# Patient Record
Sex: FEMALE | Race: WHITE | Employment: OTHER | URBAN - NONMETROPOLITAN AREA
[De-identification: names, ages, dates, MRNs, and addresses within clinical notes are randomized per-mention and may not be internally consistent; named-entity substitution may affect disease eponyms.]

---

## 2017-01-09 ENCOUNTER — HOSPITAL ENCOUNTER (OUTPATIENT)
Dept: CT IMAGING | Age: 57
Discharge: HOME | End: 2017-01-09
Payer: MEDICARE

## 2017-01-09 DIAGNOSIS — K63.9 LESION OF COLON: ICD-10-CM

## 2017-01-09 DIAGNOSIS — R10.9 ABDOMINAL PAIN, UNSPECIFIED SITE: ICD-10-CM

## 2017-01-09 PROCEDURE — 74178 CT ABD&PLV WO CNTR FLWD CNTR: CPT

## 2017-01-09 PROCEDURE — 6360000004 HC RX CONTRAST MEDICATION: Performed by: NURSE PRACTITIONER

## 2017-01-09 PROCEDURE — G0123 SCREEN CERV/VAG THIN LAYER: HCPCS | Performed by: NURSE PRACTITIONER

## 2017-01-09 RX ADMIN — IOPAMIDOL 75 ML: 755 INJECTION, SOLUTION INTRAVENOUS at 11:47

## 2017-01-10 ENCOUNTER — LAB REQUISITION (OUTPATIENT)
Dept: LAB | Facility: HOSPITAL | Age: 57
End: 2017-01-10

## 2017-01-10 DIAGNOSIS — Z01.419 ENCOUNTER FOR GYNECOLOGICAL EXAMINATION WITHOUT ABNORMAL FINDING: ICD-10-CM

## 2017-01-10 LAB
GEN CATEG CVX/VAG CYTO-IMP: NORMAL
LAB AP CASE REPORT: NORMAL
LAB AP GYN ADDITIONAL INFORMATION: NORMAL
Lab: NORMAL
PATH INTERP SPEC-IMP: NORMAL
STAT OF ADQ CVX/VAG CYTO-IMP: NORMAL

## 2017-01-25 RX ORDER — OXYCODONE AND ACETAMINOPHEN 10; 325 MG/1; MG/1
1 TABLET ORAL EVERY 4 HOURS PRN
Qty: 120 TABLET | Refills: 0 | Status: SHIPPED | OUTPATIENT
Start: 2017-01-30 | End: 2017-02-27 | Stop reason: SDUPTHER

## 2017-02-27 RX ORDER — IBUPROFEN 600 MG/1
600 TABLET ORAL EVERY 8 HOURS PRN
COMMUNITY
End: 2017-12-19 | Stop reason: SDUPTHER

## 2017-02-27 RX ORDER — SIMVASTATIN 40 MG
40 TABLET ORAL NIGHTLY
COMMUNITY
End: 2017-09-11 | Stop reason: SDUPTHER

## 2017-02-27 RX ORDER — FUROSEMIDE 40 MG/1
40 TABLET ORAL DAILY
COMMUNITY
End: 2017-09-11 | Stop reason: SDUPTHER

## 2017-02-27 RX ORDER — NITROGLYCERIN 0.4 MG/1
0.4 TABLET SUBLINGUAL EVERY 5 MIN PRN
COMMUNITY
End: 2018-02-09 | Stop reason: SDUPTHER

## 2017-02-27 RX ORDER — ERGOCALCIFEROL 1.25 MG/1
50000 CAPSULE ORAL
COMMUNITY
End: 2017-09-11 | Stop reason: SDUPTHER

## 2017-02-28 RX ORDER — OXYCODONE AND ACETAMINOPHEN 10; 325 MG/1; MG/1
1 TABLET ORAL EVERY 4 HOURS PRN
Qty: 120 TABLET | Refills: 0 | Status: SHIPPED | OUTPATIENT
Start: 2017-03-01 | End: 2017-03-29 | Stop reason: SDUPTHER

## 2017-03-29 ENCOUNTER — HOSPITAL ENCOUNTER (OUTPATIENT)
Dept: PAIN MANAGEMENT | Age: 57
Discharge: HOME | End: 2017-03-29
Payer: MEDICARE

## 2017-03-29 VITALS
HEIGHT: 66 IN | RESPIRATION RATE: 20 BRPM | BODY MASS INDEX: 33.43 KG/M2 | WEIGHT: 208 LBS | SYSTOLIC BLOOD PRESSURE: 124 MMHG | HEART RATE: 64 BPM | TEMPERATURE: 96.6 F | OXYGEN SATURATION: 94 % | DIASTOLIC BLOOD PRESSURE: 44 MMHG

## 2017-03-29 DIAGNOSIS — M51.36 LUMBAR DEGENERATIVE DISC DISEASE: ICD-10-CM

## 2017-03-29 DIAGNOSIS — M51.16 LUMBAR DISC DISEASE WITH RADICULOPATHY: Chronic | ICD-10-CM

## 2017-03-29 DIAGNOSIS — I73.9 CLAUDICATION (HCC): Primary | ICD-10-CM

## 2017-03-29 DIAGNOSIS — G89.29 CHRONIC BILATERAL LOW BACK PAIN WITH SCIATICA, SCIATICA LATERALITY UNSPECIFIED: Chronic | ICD-10-CM

## 2017-03-29 DIAGNOSIS — M51.9 LUMBAR DISC DISEASE: ICD-10-CM

## 2017-03-29 DIAGNOSIS — M54.40 CHRONIC BILATERAL LOW BACK PAIN WITH SCIATICA, SCIATICA LATERALITY UNSPECIFIED: Chronic | ICD-10-CM

## 2017-03-29 PROCEDURE — 6360000002 HC RX W HCPCS

## 2017-03-29 PROCEDURE — 2500000003 HC RX 250 WO HCPCS

## 2017-03-29 PROCEDURE — 2580000003 HC RX 258

## 2017-03-29 PROCEDURE — 3209999900 FLUORO FOR SURGICAL PROCEDURES

## 2017-03-29 PROCEDURE — 62323 NJX INTERLAMINAR LMBR/SAC: CPT

## 2017-03-29 RX ORDER — BUPIVACAINE HYDROCHLORIDE 2.5 MG/ML
INJECTION, SOLUTION EPIDURAL; INFILTRATION; INTRACAUDAL
Status: COMPLETED | OUTPATIENT
Start: 2017-03-29 | End: 2017-03-29

## 2017-03-29 RX ORDER — METHYLPREDNISOLONE ACETATE 80 MG/ML
INJECTION, SUSPENSION INTRA-ARTICULAR; INTRALESIONAL; INTRAMUSCULAR; SOFT TISSUE
Status: COMPLETED | OUTPATIENT
Start: 2017-03-29 | End: 2017-03-29

## 2017-03-29 RX ORDER — INSULIN GLARGINE 100 [IU]/ML
INJECTION, SOLUTION SUBCUTANEOUS NIGHTLY
COMMUNITY
End: 2017-12-12

## 2017-03-29 RX ORDER — 0.9 % SODIUM CHLORIDE 0.9 %
VIAL (ML) INJECTION
Status: COMPLETED | OUTPATIENT
Start: 2017-03-29 | End: 2017-03-29

## 2017-03-29 RX ORDER — LIDOCAINE HYDROCHLORIDE 10 MG/ML
INJECTION, SOLUTION EPIDURAL; INFILTRATION; INTRACAUDAL; PERINEURAL
Status: COMPLETED | OUTPATIENT
Start: 2017-03-29 | End: 2017-03-29

## 2017-03-29 RX ORDER — LORAZEPAM 0.5 MG/1
0.5 TABLET ORAL EVERY 6 HOURS PRN
COMMUNITY
End: 2017-10-24 | Stop reason: ALTCHOICE

## 2017-03-29 RX ADMIN — BUPIVACAINE HYDROCHLORIDE 5 ML: 2.5 INJECTION, SOLUTION EPIDURAL; INFILTRATION; INTRACAUDAL at 13:59

## 2017-03-29 RX ADMIN — LIDOCAINE HYDROCHLORIDE 3 ML: 10 INJECTION, SOLUTION EPIDURAL; INFILTRATION; INTRACAUDAL; PERINEURAL at 13:57

## 2017-03-29 RX ADMIN — Medication 4 ML: at 13:59

## 2017-03-29 RX ADMIN — METHYLPREDNISOLONE ACETATE 80 MG: 80 INJECTION, SUSPENSION INTRA-ARTICULAR; INTRALESIONAL; INTRAMUSCULAR; SOFT TISSUE at 13:59

## 2017-03-29 ASSESSMENT — PAIN DESCRIPTION - DESCRIPTORS: DESCRIPTORS: ACHING;CONSTANT;THROBBING

## 2017-03-29 ASSESSMENT — PAIN - FUNCTIONAL ASSESSMENT: PAIN_FUNCTIONAL_ASSESSMENT: 0-10

## 2017-05-02 RX ORDER — OXYCODONE AND ACETAMINOPHEN 10; 325 MG/1; MG/1
1 TABLET ORAL EVERY 6 HOURS PRN
Qty: 120 TABLET | Refills: 0
Start: 2017-05-03 | End: 2017-05-23 | Stop reason: SDUPTHER

## 2017-05-23 ENCOUNTER — HOSPITAL ENCOUNTER (OUTPATIENT)
Dept: PAIN MANAGEMENT | Age: 57
Discharge: HOME | End: 2017-05-23
Payer: MEDICARE

## 2017-05-23 VITALS
RESPIRATION RATE: 16 BRPM | DIASTOLIC BLOOD PRESSURE: 67 MMHG | WEIGHT: 208 LBS | TEMPERATURE: 97.1 F | BODY MASS INDEX: 33.43 KG/M2 | HEIGHT: 66 IN | OXYGEN SATURATION: 93 % | HEART RATE: 70 BPM | SYSTOLIC BLOOD PRESSURE: 106 MMHG

## 2017-05-23 DIAGNOSIS — M51.9 LUMBAR DISC DISEASE: ICD-10-CM

## 2017-05-23 DIAGNOSIS — M51.16 LUMBAR DISC DISEASE WITH RADICULOPATHY: ICD-10-CM

## 2017-05-23 PROCEDURE — 99213 OFFICE O/P EST LOW 20 MIN: CPT

## 2017-05-23 RX ORDER — OXYCODONE AND ACETAMINOPHEN 10; 325 MG/1; MG/1
1 TABLET ORAL EVERY 6 HOURS PRN
Qty: 120 TABLET | Refills: 0
Start: 2017-05-30 | End: 2017-06-26 | Stop reason: SDUPTHER

## 2017-05-23 RX ORDER — GABAPENTIN 600 MG/1
1 TABLET ORAL 3 TIMES DAILY PRN
COMMUNITY
Start: 2017-04-29 | End: 2017-09-11 | Stop reason: SDUPTHER

## 2017-05-23 ASSESSMENT — PAIN DESCRIPTION - DIRECTION: RADIATING_TOWARDS: DOWN BILATERAL LEGS

## 2017-05-23 ASSESSMENT — PAIN DESCRIPTION - ORIENTATION: ORIENTATION: LOWER

## 2017-05-23 ASSESSMENT — PAIN DESCRIPTION - PAIN TYPE: TYPE: CHRONIC PAIN

## 2017-05-23 ASSESSMENT — PAIN SCALES - GENERAL: PAINLEVEL_OUTOF10: 8

## 2017-05-23 ASSESSMENT — PAIN DESCRIPTION - ONSET: ONSET: ON-GOING

## 2017-05-23 ASSESSMENT — ACTIVITIES OF DAILY LIVING (ADL): EFFECT OF PAIN ON DAILY ACTIVITIES: DAILY ACTIVITES

## 2017-05-23 ASSESSMENT — PAIN DESCRIPTION - DESCRIPTORS: DESCRIPTORS: ACHING;CONSTANT;THROBBING

## 2017-05-23 ASSESSMENT — PAIN DESCRIPTION - LOCATION: LOCATION: BACK

## 2017-05-23 ASSESSMENT — PAIN DESCRIPTION - PROGRESSION: CLINICAL_PROGRESSION: NOT CHANGED

## 2017-05-23 ASSESSMENT — PAIN DESCRIPTION - FREQUENCY: FREQUENCY: CONTINUOUS

## 2017-06-27 RX ORDER — OXYCODONE AND ACETAMINOPHEN 10; 325 MG/1; MG/1
1 TABLET ORAL EVERY 6 HOURS PRN
Qty: 120 TABLET | Refills: 0 | Status: SHIPPED | OUTPATIENT
Start: 2017-06-29 | End: 2017-07-24 | Stop reason: SDUPTHER

## 2017-07-25 RX ORDER — OXYCODONE AND ACETAMINOPHEN 10; 325 MG/1; MG/1
1 TABLET ORAL EVERY 6 HOURS PRN
Qty: 120 TABLET | Refills: 0 | Status: SHIPPED | OUTPATIENT
Start: 2017-07-29 | End: 2017-08-24 | Stop reason: SDUPTHER

## 2017-08-25 RX ORDER — OXYCODONE AND ACETAMINOPHEN 10; 325 MG/1; MG/1
1 TABLET ORAL EVERY 6 HOURS PRN
Qty: 40 TABLET | Refills: 0 | Status: SHIPPED | OUTPATIENT
Start: 2017-08-28 | End: 2017-09-07 | Stop reason: SDUPTHER

## 2017-09-07 ENCOUNTER — HOSPITAL ENCOUNTER (OUTPATIENT)
Dept: PAIN MANAGEMENT | Age: 57
Discharge: HOME | End: 2017-09-07
Payer: MEDICARE

## 2017-09-07 VITALS
SYSTOLIC BLOOD PRESSURE: 133 MMHG | HEIGHT: 66 IN | HEART RATE: 90 BPM | RESPIRATION RATE: 18 BRPM | TEMPERATURE: 98.1 F | OXYGEN SATURATION: 95 % | DIASTOLIC BLOOD PRESSURE: 65 MMHG | BODY MASS INDEX: 31.34 KG/M2 | WEIGHT: 195 LBS

## 2017-09-07 DIAGNOSIS — M51.16 LUMBAR DISC DISEASE WITH RADICULOPATHY: Primary | ICD-10-CM

## 2017-09-07 DIAGNOSIS — M51.9 LUMBAR DISC DISEASE: ICD-10-CM

## 2017-09-07 DIAGNOSIS — M54.40 CHRONIC BILATERAL LOW BACK PAIN WITH SCIATICA, SCIATICA LATERALITY UNSPECIFIED: Chronic | ICD-10-CM

## 2017-09-07 DIAGNOSIS — G89.29 CHRONIC BILATERAL LOW BACK PAIN WITH SCIATICA, SCIATICA LATERALITY UNSPECIFIED: Chronic | ICD-10-CM

## 2017-09-07 PROCEDURE — 99214 OFFICE O/P EST MOD 30 MIN: CPT

## 2017-09-07 RX ORDER — OXYCODONE AND ACETAMINOPHEN 10; 325 MG/1; MG/1
1 TABLET ORAL 2 TIMES DAILY PRN
Qty: 60 TABLET | Refills: 0 | Status: SHIPPED | OUTPATIENT
Start: 2017-09-07 | End: 2017-09-20 | Stop reason: ALTCHOICE

## 2017-09-07 RX ORDER — OXYCODONE AND ACETAMINOPHEN 10; 325 MG/1; MG/1
1 TABLET ORAL EVERY 6 HOURS PRN
Qty: 120 TABLET | Refills: 0 | Status: SHIPPED | OUTPATIENT
Start: 2017-09-07 | End: 2017-09-07 | Stop reason: SDUPTHER

## 2017-09-07 ASSESSMENT — PAIN - FUNCTIONAL ASSESSMENT: PAIN_FUNCTIONAL_ASSESSMENT: 0-10

## 2017-09-07 ASSESSMENT — PAIN DESCRIPTION - DESCRIPTORS: DESCRIPTORS: ACHING;CONSTANT;THROBBING;RADIATING;NUMBNESS;TINGLING

## 2017-09-07 ASSESSMENT — ACTIVITIES OF DAILY LIVING (ADL): EFFECT OF PAIN ON DAILY ACTIVITIES: DAILY CHORES AND ACTIVITIES

## 2017-09-11 ENCOUNTER — OFFICE VISIT (OUTPATIENT)
Dept: INTERNAL MEDICINE | Age: 57
End: 2017-09-11
Payer: MEDICARE

## 2017-09-11 VITALS
DIASTOLIC BLOOD PRESSURE: 82 MMHG | OXYGEN SATURATION: 94 % | HEART RATE: 76 BPM | WEIGHT: 195 LBS | BODY MASS INDEX: 31.34 KG/M2 | TEMPERATURE: 97.9 F | SYSTOLIC BLOOD PRESSURE: 134 MMHG | RESPIRATION RATE: 18 BRPM | HEIGHT: 66 IN

## 2017-09-11 DIAGNOSIS — M81.0 OSTEOPOROSIS, UNSPECIFIED OSTEOPOROSIS TYPE, UNSPECIFIED PATHOLOGICAL FRACTURE PRESENCE: Primary | ICD-10-CM

## 2017-09-11 DIAGNOSIS — E88.9 PERIPHERAL NEUROPATHY DUE TO METABOLIC DISORDER (HCC): ICD-10-CM

## 2017-09-11 DIAGNOSIS — G63 PERIPHERAL NEUROPATHY DUE TO METABOLIC DISORDER (HCC): ICD-10-CM

## 2017-09-11 DIAGNOSIS — E78.2 MIXED HYPERLIPIDEMIA: ICD-10-CM

## 2017-09-11 DIAGNOSIS — E11.40 TYPE 2 DIABETES MELLITUS WITH DIABETIC NEUROPATHY, WITHOUT LONG-TERM CURRENT USE OF INSULIN (HCC): ICD-10-CM

## 2017-09-11 DIAGNOSIS — D64.9 ANEMIA, UNSPECIFIED TYPE: ICD-10-CM

## 2017-09-11 DIAGNOSIS — G89.4 CHRONIC PAIN SYNDROME: ICD-10-CM

## 2017-09-11 DIAGNOSIS — Z12.31 ENCOUNTER FOR SCREENING MAMMOGRAM FOR BREAST CANCER: ICD-10-CM

## 2017-09-11 DIAGNOSIS — E55.9 VITAMIN D DEFICIENCY: ICD-10-CM

## 2017-09-11 DIAGNOSIS — Z00.00 HEALTH CARE MAINTENANCE: ICD-10-CM

## 2017-09-11 PROCEDURE — G8427 DOCREV CUR MEDS BY ELIG CLIN: HCPCS | Performed by: NURSE PRACTITIONER

## 2017-09-11 PROCEDURE — 4004F PT TOBACCO SCREEN RCVD TLK: CPT | Performed by: NURSE PRACTITIONER

## 2017-09-11 PROCEDURE — 99214 OFFICE O/P EST MOD 30 MIN: CPT | Performed by: NURSE PRACTITIONER

## 2017-09-11 PROCEDURE — 4005F PHARM THX FOR OP RXD: CPT | Performed by: NURSE PRACTITIONER

## 2017-09-11 PROCEDURE — G8417 CALC BMI ABV UP PARAM F/U: HCPCS | Performed by: NURSE PRACTITIONER

## 2017-09-11 PROCEDURE — 3017F COLORECTAL CA SCREEN DOC REV: CPT | Performed by: NURSE PRACTITIONER

## 2017-09-11 PROCEDURE — 3014F SCREEN MAMMO DOC REV: CPT | Performed by: NURSE PRACTITIONER

## 2017-09-11 PROCEDURE — 3046F HEMOGLOBIN A1C LEVEL >9.0%: CPT | Performed by: NURSE PRACTITIONER

## 2017-09-11 RX ORDER — GABAPENTIN 300 MG/1
300 CAPSULE ORAL 3 TIMES DAILY
Qty: 90 CAPSULE | Refills: 3 | Status: SHIPPED | OUTPATIENT
Start: 2017-09-11 | End: 2017-10-18 | Stop reason: SDUPTHER

## 2017-09-11 RX ORDER — CHLORAL HYDRATE 500 MG
3000 CAPSULE ORAL 3 TIMES DAILY
Qty: 90 CAPSULE | Refills: 3 | Status: SHIPPED | OUTPATIENT
Start: 2017-09-11 | End: 2017-09-11 | Stop reason: SDUPTHER

## 2017-09-11 RX ORDER — VENLAFAXINE HYDROCHLORIDE 150 MG/1
150 CAPSULE, EXTENDED RELEASE ORAL DAILY
Qty: 30 CAPSULE | Refills: 0 | Status: SHIPPED | OUTPATIENT
Start: 2017-09-11 | End: 2017-10-16 | Stop reason: SDUPTHER

## 2017-09-11 RX ORDER — GLIPIZIDE 5 MG/1
5 TABLET ORAL
Qty: 60 TABLET | Refills: 2 | Status: SHIPPED | OUTPATIENT
Start: 2017-09-11 | End: 2017-12-19 | Stop reason: SDUPTHER

## 2017-09-11 RX ORDER — CHLORAL HYDRATE 500 MG
1000 CAPSULE ORAL 2 TIMES DAILY
Qty: 90 CAPSULE | Refills: 3 | Status: SHIPPED | OUTPATIENT
Start: 2017-09-11 | End: 2018-06-25 | Stop reason: SDUPTHER

## 2017-09-11 RX ORDER — SIMVASTATIN 40 MG
40 TABLET ORAL NIGHTLY
Qty: 30 TABLET | Refills: 2 | Status: SHIPPED | OUTPATIENT
Start: 2017-09-11 | End: 2018-02-09 | Stop reason: SDUPTHER

## 2017-09-11 RX ORDER — ERGOCALCIFEROL 1.25 MG/1
50000 CAPSULE ORAL
Qty: 8 CAPSULE | Refills: 2 | Status: SHIPPED | OUTPATIENT
Start: 2017-09-11 | End: 2018-06-25 | Stop reason: SDUPTHER

## 2017-09-11 RX ORDER — METOPROLOL SUCCINATE 50 MG/1
25 TABLET, EXTENDED RELEASE ORAL DAILY
Qty: 30 TABLET | Refills: 11 | Status: SHIPPED | OUTPATIENT
Start: 2017-09-11 | End: 2017-12-19 | Stop reason: SDUPTHER

## 2017-09-11 RX ORDER — LISINOPRIL 40 MG/1
20 TABLET ORAL DAILY
Qty: 30 TABLET | Refills: 1 | Status: SHIPPED | OUTPATIENT
Start: 2017-09-11 | End: 2018-02-09 | Stop reason: SDUPTHER

## 2017-09-11 RX ORDER — OMEPRAZOLE 40 MG/1
40 CAPSULE, DELAYED RELEASE ORAL DAILY
Qty: 30 CAPSULE | Refills: 2 | Status: SHIPPED | OUTPATIENT
Start: 2017-09-11 | End: 2017-12-19 | Stop reason: SDUPTHER

## 2017-09-11 RX ORDER — FUROSEMIDE 40 MG/1
40 TABLET ORAL DAILY
Qty: 30 TABLET | Refills: 3 | Status: SHIPPED | OUTPATIENT
Start: 2017-09-11 | End: 2019-07-02

## 2017-09-11 ASSESSMENT — PATIENT HEALTH QUESTIONNAIRE - PHQ9
SUM OF ALL RESPONSES TO PHQ9 QUESTIONS 1 & 2: 0
2. FEELING DOWN, DEPRESSED OR HOPELESS: 0
1. LITTLE INTEREST OR PLEASURE IN DOING THINGS: 0
SUM OF ALL RESPONSES TO PHQ QUESTIONS 1-9: 0

## 2017-09-18 ENCOUNTER — TELEPHONE (OUTPATIENT)
Dept: PAIN MANAGEMENT | Age: 57
End: 2017-09-18

## 2017-09-20 RX ORDER — HYDROCODONE BITARTRATE AND ACETAMINOPHEN 7.5; 325 MG/1; MG/1
1 TABLET ORAL EVERY 6 HOURS PRN
Qty: 120 TABLET | Refills: 0 | Status: SHIPPED | OUTPATIENT
Start: 2017-09-20 | End: 2017-09-27

## 2017-09-22 ENCOUNTER — HOSPITAL ENCOUNTER (OUTPATIENT)
Dept: WOMENS IMAGING | Age: 57
Discharge: HOME | End: 2017-09-22
Payer: MEDICARE

## 2017-09-22 DIAGNOSIS — Z12.31 ENCOUNTER FOR SCREENING MAMMOGRAM FOR BREAST CANCER: ICD-10-CM

## 2017-09-22 DIAGNOSIS — M81.0 OSTEOPOROSIS, UNSPECIFIED OSTEOPOROSIS TYPE, UNSPECIFIED PATHOLOGICAL FRACTURE PRESENCE: ICD-10-CM

## 2017-09-22 DIAGNOSIS — Z00.00 HEALTH CARE MAINTENANCE: ICD-10-CM

## 2017-09-22 PROCEDURE — 77063 BREAST TOMOSYNTHESIS BI: CPT

## 2017-09-22 PROCEDURE — 77080 DXA BONE DENSITY AXIAL: CPT

## 2017-10-02 ENCOUNTER — TELEPHONE (OUTPATIENT)
Dept: WOMENS IMAGING | Age: 57
End: 2017-10-02

## 2017-10-06 NOTE — TELEPHONE ENCOUNTER
Contacted patient regarding abnormal screening mammogram results and scheduled her for diagnostic imaging

## 2017-10-16 DIAGNOSIS — Z00.00 HEALTH CARE MAINTENANCE: ICD-10-CM

## 2017-10-16 RX ORDER — VENLAFAXINE HYDROCHLORIDE 150 MG/1
150 CAPSULE, EXTENDED RELEASE ORAL DAILY
Qty: 30 CAPSULE | Refills: 0 | Status: SHIPPED | OUTPATIENT
Start: 2017-10-16 | End: 2017-11-20 | Stop reason: SDUPTHER

## 2017-10-18 DIAGNOSIS — Z00.00 HEALTH CARE MAINTENANCE: ICD-10-CM

## 2017-10-18 RX ORDER — GABAPENTIN 600 MG/1
TABLET ORAL
COMMUNITY
Start: 2017-09-12 | End: 2017-10-18 | Stop reason: DRUGHIGH

## 2017-10-18 RX ORDER — GABAPENTIN 300 MG/1
300 CAPSULE ORAL 3 TIMES DAILY
Qty: 90 CAPSULE | Refills: 0 | Status: SHIPPED | OUTPATIENT
Start: 2017-10-18 | End: 2017-11-15 | Stop reason: SDUPTHER

## 2017-10-18 RX ORDER — GABAPENTIN 300 MG/1
300 CAPSULE ORAL 3 TIMES DAILY
Qty: 90 CAPSULE | Refills: 3 | Status: SHIPPED | OUTPATIENT
Start: 2017-10-18 | End: 2017-10-18 | Stop reason: SDUPTHER

## 2017-10-18 RX ORDER — HYDROCODONE BITARTRATE AND ACETAMINOPHEN 7.5; 325 MG/1; MG/1
1 TABLET ORAL EVERY 6 HOURS PRN
Qty: 120 TABLET | Refills: 0 | Status: SHIPPED | OUTPATIENT
Start: 2017-10-18 | End: 2017-10-25

## 2017-10-18 NOTE — TELEPHONE ENCOUNTER
Oswald Farnsworth called requesting a refill of the below medication which has been pended for you:     Requested Prescriptions     Pending Prescriptions Disp Refills    gabapentin (NEURONTIN) 300 MG capsule 90 capsule 0     Sig: Take 1 capsule by mouth 3 times daily       Last Appointment Date: 9/11/2017  Next Appointment Date: Visit date not found    Allergies   Allergen Reactions    Levaquin [Levofloxacin In D5w] Anaphylaxis    Noroxin [Norfloxacin] Shortness Of Breath    Codeine     Prozac [Fluoxetine Hcl]

## 2017-10-18 NOTE — TELEPHONE ENCOUNTER
From: Mustapha Bautista  Sent: 10/18/2017 1:32 PM CDT  Subject: Medication Renewal Request    Mustapha Bautista would like a refill of the following medications:  gabapentin (NEURONTIN) 300 MG capsule MAGDALENA Beltran]    Preferred pharmacy: [facility] [address] [place] [address]. [address] [phone] - F [phone]    Comment:  Need refill on my Hydrocodone 7.5,, Also ask Dr. Shaun Naylor that it was supposed to be the 10mg. .., as we had discussed? ??

## 2017-11-15 DIAGNOSIS — Z00.00 HEALTH CARE MAINTENANCE: ICD-10-CM

## 2017-11-15 RX ORDER — GABAPENTIN 300 MG/1
300 CAPSULE ORAL 3 TIMES DAILY
Qty: 90 CAPSULE | Refills: 0 | Status: SHIPPED | OUTPATIENT
Start: 2017-11-15 | End: 2017-12-13 | Stop reason: SDUPTHER

## 2017-11-15 RX ORDER — HYDROCODONE BITARTRATE AND ACETAMINOPHEN 7.5; 325 MG/1; MG/1
1 TABLET ORAL EVERY 6 HOURS PRN
Qty: 120 TABLET | Refills: 0 | Status: SHIPPED | OUTPATIENT
Start: 2017-11-15 | End: 2018-01-10 | Stop reason: ALTCHOICE

## 2017-11-15 RX ORDER — HYDROCODONE BITARTRATE AND ACETAMINOPHEN 7.5; 325 MG/1; MG/1
7.5 TABLET ORAL EVERY 6 HOURS PRN
COMMUNITY
Start: 2017-10-20 | End: 2017-11-15 | Stop reason: SDUPTHER

## 2017-11-20 DIAGNOSIS — Z00.00 HEALTH CARE MAINTENANCE: ICD-10-CM

## 2017-11-21 RX ORDER — VENLAFAXINE HYDROCHLORIDE 150 MG/1
150 CAPSULE, EXTENDED RELEASE ORAL DAILY
Qty: 30 CAPSULE | Refills: 0 | Status: SHIPPED | OUTPATIENT
Start: 2017-11-21 | End: 2017-12-19 | Stop reason: SDUPTHER

## 2017-12-12 ENCOUNTER — OFFICE VISIT (OUTPATIENT)
Dept: INTERNAL MEDICINE | Age: 57
End: 2017-12-12
Payer: MEDICARE

## 2017-12-12 VITALS
RESPIRATION RATE: 16 BRPM | HEIGHT: 66 IN | SYSTOLIC BLOOD PRESSURE: 130 MMHG | WEIGHT: 182 LBS | OXYGEN SATURATION: 95 % | TEMPERATURE: 97.1 F | BODY MASS INDEX: 29.25 KG/M2 | DIASTOLIC BLOOD PRESSURE: 82 MMHG | HEART RATE: 70 BPM

## 2017-12-12 DIAGNOSIS — R30.0 DYSURIA: Primary | ICD-10-CM

## 2017-12-12 DIAGNOSIS — G89.4 CHRONIC PAIN SYNDROME: ICD-10-CM

## 2017-12-12 DIAGNOSIS — M51.36 LUMBAR DEGENERATIVE DISC DISEASE: ICD-10-CM

## 2017-12-12 LAB
APPEARANCE FLUID: ABNORMAL
BILIRUBIN, POC: ABNORMAL
BLOOD URINE, POC: ABNORMAL
CLARITY, POC: ABNORMAL
COLOR, POC: ABNORMAL
GLUCOSE URINE, POC: NEGATIVE
KETONES, POC: NEGATIVE
LEUKOCYTE EST, POC: ABNORMAL
NITRITE, POC: POSITIVE
PH, POC: 6
PROTEIN, POC: 100
SPECIFIC GRAVITY, POC: >1.03
UROBILINOGEN, POC: 0.1

## 2017-12-12 PROCEDURE — 3014F SCREEN MAMMO DOC REV: CPT | Performed by: NURSE PRACTITIONER

## 2017-12-12 PROCEDURE — G8484 FLU IMMUNIZE NO ADMIN: HCPCS | Performed by: NURSE PRACTITIONER

## 2017-12-12 PROCEDURE — 3017F COLORECTAL CA SCREEN DOC REV: CPT | Performed by: NURSE PRACTITIONER

## 2017-12-12 PROCEDURE — G8417 CALC BMI ABV UP PARAM F/U: HCPCS | Performed by: NURSE PRACTITIONER

## 2017-12-12 PROCEDURE — 4004F PT TOBACCO SCREEN RCVD TLK: CPT | Performed by: NURSE PRACTITIONER

## 2017-12-12 PROCEDURE — 99213 OFFICE O/P EST LOW 20 MIN: CPT | Performed by: NURSE PRACTITIONER

## 2017-12-12 PROCEDURE — G8427 DOCREV CUR MEDS BY ELIG CLIN: HCPCS | Performed by: NURSE PRACTITIONER

## 2017-12-12 RX ORDER — CEFDINIR 300 MG/1
300 CAPSULE ORAL 2 TIMES DAILY
Qty: 14 CAPSULE | Refills: 0 | Status: SHIPPED | OUTPATIENT
Start: 2017-12-12 | End: 2017-12-19

## 2017-12-12 RX ORDER — HYDROCODONE BITARTRATE AND ACETAMINOPHEN 10; 325 MG/1; MG/1
1 TABLET ORAL EVERY 6 HOURS PRN
Qty: 120 TABLET | Refills: 0 | Status: SHIPPED | OUTPATIENT
Start: 2017-12-12 | End: 2018-01-10 | Stop reason: SDUPTHER

## 2017-12-12 RX ORDER — PHENAZOPYRIDINE HYDROCHLORIDE 200 MG/1
200 TABLET, FILM COATED ORAL 3 TIMES DAILY PRN
Qty: 9 TABLET | Refills: 0 | Status: SHIPPED | OUTPATIENT
Start: 2017-12-12 | End: 2017-12-15

## 2017-12-12 ASSESSMENT — ENCOUNTER SYMPTOMS
ABDOMINAL DISTENTION: 0
COLOR CHANGE: 0
COUGH: 0
SHORTNESS OF BREATH: 0
TROUBLE SWALLOWING: 0
SORE THROAT: 0
EYE DISCHARGE: 0
EYE ITCHING: 0
DIARRHEA: 0
ABDOMINAL PAIN: 0
CONSTIPATION: 0
NAUSEA: 0
WHEEZING: 0
BACK PAIN: 1
VOMITING: 0
STRIDOR: 0
CHOKING: 0
BLOOD IN STOOL: 0

## 2017-12-12 NOTE — PROGRESS NOTES
[facility]  [facility]  [address] [place] [address]  Dept: [phone]  Dept Fax: [phone]  Loc: [phone]    Adina Moreira is a 56 y.o. female who presents today for her medical conditions/complaints as noted below. Adina Moreira is c/o of Dysuria (Patient states dysuria x 1 week and urine has been cloudy.)        HPI:     HPI   1. Dysuria; For over 10 days ; no fever; Has only been drinking cranberry juice;  A neighbor gave her a pyridium last night so she could sleep  2.. Chronic back pain; The 7.5 lortab which we decreased from 10 is not holding her pain down; She had stopped going to [facility] and we are going to prescribe for her;  Transportation is an issue for her   Chief Complaint   Patient presents with    Dysuria     Patient states dysuria x 1 week and urine has been cloudy.        Past Medical History:   Diagnosis Date    Anxiety     Arthritis     Bilateral low back pain with sciatica 12/21/2015    Bladder incontinence     CAD (coronary artery disease)     CAD (coronary artery disease)     Cervical cancer (HCC)     H/O    COPD (chronic obstructive pulmonary disease) (HCC)     Depression     Diabetes mellitus (HCC)     GERD (gastroesophageal reflux disease)     History of blood transfusion     Hyperlipidemia     Hypertension     Hypoxia       Past Surgical History:   Procedure Laterality Date   [facility] CARDIAC SURGERY      october 8th 2009    CHOLECYSTECTOMY, LAPAROSCOPIC      CORONARY ARTERY BYPASS GRAFT      8/8 triple       Family History   Problem Relation Age of Onset    Cancer Mother     Kidney Disease Father      renal failure       Social History   Substance Use Topics    Smoking status: Current Every Day Smoker     Packs/day: 1.00     Types: Cigarettes    Smokeless tobacco: Never Used    Alcohol use No      Current Outpatient Prescriptions   Medication Sig Dispense Refill    cefdinir (OMNICEF) 300 MG capsule Take 1 capsule by mouth 2 times daily for 7 days 14 capsule 0    phenazopyridine (PYRIDIUM) 200 MG tablet Take 1 tablet by mouth 3 times daily as needed for Pain 9 tablet 0    HYDROcodone-acetaminophen (NORCO)  MG per tablet Take 1 tablet by mouth every 6 hours as needed for Pain . 120 tablet 0    venlafaxine (EFFEXOR XR) 150 MG extended release capsule Take 1 capsule by mouth daily 30 capsule 0    gabapentin (NEURONTIN) 300 MG capsule Take 1 capsule by mouth 3 times daily 90 capsule 0    HYDROcodone-acetaminophen (NORCO) 7.5-325 MG per tablet Take 1 tablet by mouth every 6 hours as needed for Pain . 120 tablet 0    vitamin D (ERGOCALCIFEROL) 89018 units CAPS capsule Take 1 capsule by mouth Twice a Week 8 capsule 2    VENTOLIN  (90 Base) MCG/ACT inhaler Inhale 1 puff into the lungs 4 times daily 1 Inhaler 1    SITagliptin (JANUVIA) 100 MG tablet Take 1 tablet by mouth daily 30 tablet 2    simvastatin (ZOCOR) 40 MG tablet Take 1 tablet by mouth nightly 30 tablet 2    omeprazole (PRILOSEC) 40 MG delayed release capsule Take 1 capsule by mouth daily 30 capsule 2    metoprolol succinate (TOPROL XL) 50 MG extended release tablet Take 0.5 tablets by mouth daily 30 tablet 11    lisinopril (PRINIVIL;ZESTRIL) 40 MG tablet Take 0.5 tablets by mouth daily 30 tablet 1    glipiZIDE (GLUCOTROL) 5 MG tablet Take 1 tablet by mouth 2 times daily (before meals) 60 tablet 2    furosemide (LASIX) 40 MG tablet Take 1 tablet by mouth daily 30 tablet 3    Omega-3 Fatty Acids (FISH OIL) 1000 MG CAPS Take 1 capsule by mouth 2 times daily 90 capsule 3    OXYGEN Inhale into the lungs every 8 hours      nitroGLYCERIN (NITROSTAT) 0.4 MG SL tablet Place 0.4 mg under the tongue every 5 minutes as needed for Chest pain up to max of 3 total doses. If no relief after 1 dose, call 911.       ibuprofen (ADVIL;MOTRIN) 600 MG tablet Take 600 mg by mouth every 8 hours as needed for Pain      Omega-3 Fatty Acids (FISH OIL EXTRA STRENGTH PO) Take 1,200 mg by mouth 2 times daily        No current facility-administered medications for this visit. Allergies   Allergen Reactions    Levaquin [Levofloxacin In D5w] Anaphylaxis    Noroxin [Norfloxacin] Shortness Of Breath    Codeine     Prozac [Fluoxetine Hcl]        Health Maintenance   Topic Date Due    Hepatitis C screen  1960    HIV screen  07/21/1977    DTaP/Tdap/Td vaccine (1 - Tdap) 07/21/1981    Pneumococcal med risk (1 of 1 - PPSV23) 07/21/1981    Cervical cancer screen  07/21/1983    Lipid screen  07/21/2002    Diabetes screen  07/21/2002    Colon cancer screen colonoscopy  07/21/2012    Smoker: low dose lung CT screening  07/21/2017    Flu vaccine (1) 09/01/2017    Breast cancer screen  09/22/2019       Subjective:      Review of Systems   Constitutional: Negative for fatigue, fever and unexpected weight change. HENT: Negative for ear discharge, ear pain, mouth sores, sore throat and trouble swallowing. Eyes: Negative for discharge, itching and visual disturbance. Respiratory: Negative for cough, choking, shortness of breath, wheezing and stridor. Cardiovascular: Negative for chest pain, palpitations and leg swelling. Gastrointestinal: Negative for abdominal distention, abdominal pain, blood in stool, constipation, diarrhea, nausea and vomiting. Endocrine: Negative for cold intolerance, polydipsia and polyuria. Genitourinary: Positive for dysuria. Negative for difficulty urinating, frequency and urgency. Musculoskeletal: Positive for back pain. Negative for arthralgias and gait problem. Skin: Negative for color change and rash. Allergic/Immunologic: Negative for food allergies and immunocompromised state. Neurological: Negative for dizziness, tremors, syncope, speech difficulty, weakness and headaches. Hematological: Negative for adenopathy. Does not bruise/bleed easily.    Psychiatric/Behavioral: Negative for confusion and hallucinations. Objective:     Physical Exam   Constitutional: She is oriented to person, place, and time. She appears well-developed and well-nourished. No distress. HENT:   Head: Normocephalic and atraumatic. Eyes: Pupils are equal, round, and reactive to light. Right eye exhibits no discharge. Left eye exhibits no discharge. No scleral icterus. Neck: Normal range of motion. Neck supple. No JVD present. No thyromegaly present. Cardiovascular: Normal rate, regular rhythm and normal heart sounds. No murmur heard. Pulmonary/Chest: Effort normal and breath sounds normal. No respiratory distress. She has no wheezes. She has no rales. Abdominal: Soft. Bowel sounds are normal. She exhibits no distension and no mass. There is no tenderness. There is no rebound and no guarding. Musculoskeletal: Normal range of motion. She exhibits no edema or tenderness. Neurological: She is alert and oriented to person, place, and time. She has normal reflexes. No cranial nerve deficit. Coordination normal.   Skin: Skin is warm and dry. No rash noted. No erythema. Psychiatric: Her behavior is normal. Judgment and thought content normal. She does not exhibit a depressed mood. /82   Pulse 70   Temp 97.1 °F (36.2 °C)   Resp 16   Ht 5' 5.5\" (1.664 m)   Wt 182 lb (82.6 kg)   SpO2 95%   BMI 29.83 kg/m²     Assessment:      1. Dysuria  POCT Urinalysis no Micro   2. Chronic pain syndrome     3. Lumbar degenerative disc disease         Plan:        Patient given educational materials - see patient instructions. Discussed use, benefit, and side effects of prescribed medications. All patient questions answered. Pt voiced understanding. Reviewed health maintenance. Instructed to continue current medications, diet and exercise. Patient agreed with treatment plan. Follow up as directed.    MEDICATIONS:  Orders Placed This Encounter   Medications    cefdinir (OMNICEF) 300 MG capsule     Sig: Take 1 capsule by mouth 2 times daily for 7 days     Dispense:  14 capsule     Refill:  0    phenazopyridine (PYRIDIUM) 200 MG tablet     Sig: Take 1 tablet by mouth 3 times daily as needed for Pain     Dispense:  9 tablet     Refill:  0    HYDROcodone-acetaminophen (NORCO)  MG per tablet     Sig: Take 1 tablet by mouth every 6 hours as needed for Pain . Dispense:  120 tablet     Refill:  0         ORDERS:  Orders Placed This Encounter   Procedures    POCT Urinalysis no Micro       Follow-up:  No Follow-up on file. PATIENT INSTRUCTIONS:  Patient Instructions   1. UTI:   Cefdinir 300 twice a day for 7 days, get 2 antibiotics in today  Pyridium; Take 1tab  3 times a day for 3 days;  Get 3 of those in today  When you get your meds; take 1 with a full bottle of water, your urine will be orange; That numbs your bladder; Those will help your symptoms until the antibiotic kills the bacteria and you will have not more symptoms; Be sure you have something on your stomach when you take the pyridium as it can cause nausea   Also take ALL the antibiotic; Dont stop it when your symptoms are better;   I will call yo with the culture report if we need to change medication. 2.  Chronic pain syndrome  -  Increased pain on 7.5.   We have increased to 10 mg up to 4 times a day as she had previously been;     Electronically signed by MAGDALENA Amaya on 12/12/2017 at 3:39 PM

## 2017-12-12 NOTE — PATIENT INSTRUCTIONS
1. UTI:   Cefdinir 300 twice a day for 7 days, get 2 antibiotics in today  Pyridium; Take 1tab  3 times a day for 3 days;  Get 3 of those in today  When you get your meds; take 1 with a full bottle of water, your urine will be orange; That numbs your bladder; Those will help your symptoms until the antibiotic kills the bacteria and you will have not more symptoms; Be sure you have something on your stomach when you take the pyridium as it can cause nausea   Also take ALL the antibiotic; Dont stop it when your symptoms are better;   I will call yo with the culture report if we need to change medication. 2.  Chronic pain syndrome  -  Increased pain on 7.5.   We have increased to 10 mg up to 4 times a day as she had previously been;

## 2017-12-13 DIAGNOSIS — Z00.00 HEALTH CARE MAINTENANCE: ICD-10-CM

## 2017-12-13 RX ORDER — GABAPENTIN 300 MG/1
300 CAPSULE ORAL 3 TIMES DAILY
Qty: 90 CAPSULE | Refills: 0 | Status: SHIPPED | OUTPATIENT
Start: 2017-12-13 | End: 2018-01-10 | Stop reason: SDUPTHER

## 2017-12-14 LAB
ORGANISM: ABNORMAL
URINE CULTURE, ROUTINE: ABNORMAL
URINE CULTURE, ROUTINE: ABNORMAL

## 2017-12-19 DIAGNOSIS — Z00.00 HEALTH CARE MAINTENANCE: ICD-10-CM

## 2017-12-19 RX ORDER — GLIPIZIDE 5 MG/1
5 TABLET ORAL
Qty: 60 TABLET | Refills: 2 | Status: SHIPPED | OUTPATIENT
Start: 2017-12-19 | End: 2018-01-30 | Stop reason: SDUPTHER

## 2017-12-19 RX ORDER — OMEPRAZOLE 40 MG/1
40 CAPSULE, DELAYED RELEASE ORAL DAILY
Qty: 30 CAPSULE | Refills: 2 | Status: SHIPPED | OUTPATIENT
Start: 2017-12-19 | End: 2018-01-30 | Stop reason: SDUPTHER

## 2017-12-19 RX ORDER — VENLAFAXINE HYDROCHLORIDE 150 MG/1
150 CAPSULE, EXTENDED RELEASE ORAL DAILY
Qty: 30 CAPSULE | Refills: 0 | Status: SHIPPED | OUTPATIENT
Start: 2017-12-19 | End: 2018-01-30 | Stop reason: SDUPTHER

## 2017-12-19 RX ORDER — IBUPROFEN 600 MG/1
600 TABLET ORAL EVERY 8 HOURS PRN
Qty: 120 TABLET | Refills: 1 | Status: SHIPPED | OUTPATIENT
Start: 2017-12-19 | End: 2018-01-30 | Stop reason: SDUPTHER

## 2017-12-19 RX ORDER — METOPROLOL SUCCINATE 50 MG/1
25 TABLET, EXTENDED RELEASE ORAL DAILY
Qty: 30 TABLET | Refills: 11 | Status: SHIPPED | OUTPATIENT
Start: 2017-12-19 | End: 2018-01-30 | Stop reason: SDUPTHER

## 2018-01-10 DIAGNOSIS — Z00.00 HEALTH CARE MAINTENANCE: ICD-10-CM

## 2018-01-10 RX ORDER — HYDROCODONE BITARTRATE AND ACETAMINOPHEN 10; 325 MG/1; MG/1
1 TABLET ORAL EVERY 6 HOURS PRN
Qty: 120 TABLET | Refills: 0 | Status: SHIPPED | OUTPATIENT
Start: 2018-01-10 | End: 2018-02-09 | Stop reason: SDUPTHER

## 2018-01-10 RX ORDER — GABAPENTIN 300 MG/1
300 CAPSULE ORAL 3 TIMES DAILY
Qty: 90 CAPSULE | Refills: 0 | Status: SHIPPED | OUTPATIENT
Start: 2018-01-10 | End: 2018-02-09 | Stop reason: SDUPTHER

## 2018-01-30 DIAGNOSIS — Z00.00 HEALTH CARE MAINTENANCE: ICD-10-CM

## 2018-01-30 RX ORDER — OMEPRAZOLE 40 MG/1
40 CAPSULE, DELAYED RELEASE ORAL DAILY
Qty: 30 CAPSULE | Refills: 2 | Status: SHIPPED | OUTPATIENT
Start: 2018-01-30 | End: 2018-03-07 | Stop reason: SDUPTHER

## 2018-01-30 RX ORDER — IBUPROFEN 600 MG/1
600 TABLET ORAL EVERY 8 HOURS PRN
Qty: 120 TABLET | Refills: 1 | Status: SHIPPED | OUTPATIENT
Start: 2018-01-30 | End: 2018-06-25 | Stop reason: SDUPTHER

## 2018-01-30 RX ORDER — METOPROLOL SUCCINATE 50 MG/1
25 TABLET, EXTENDED RELEASE ORAL DAILY
Qty: 30 TABLET | Refills: 11 | Status: SHIPPED | OUTPATIENT
Start: 2018-01-30 | End: 2018-02-09 | Stop reason: SDUPTHER

## 2018-01-30 RX ORDER — VENLAFAXINE HYDROCHLORIDE 150 MG/1
150 CAPSULE, EXTENDED RELEASE ORAL DAILY
Qty: 30 CAPSULE | Refills: 0 | Status: SHIPPED | OUTPATIENT
Start: 2018-01-30 | End: 2018-02-09 | Stop reason: SDUPTHER

## 2018-01-30 RX ORDER — GLIPIZIDE 5 MG/1
5 TABLET ORAL
Qty: 60 TABLET | Refills: 2 | Status: SHIPPED | OUTPATIENT
Start: 2018-01-30 | End: 2018-02-09 | Stop reason: SDUPTHER

## 2018-02-09 DIAGNOSIS — Z00.00 HEALTH CARE MAINTENANCE: ICD-10-CM

## 2018-02-09 RX ORDER — HYDROCODONE BITARTRATE AND ACETAMINOPHEN 10; 325 MG/1; MG/1
1 TABLET ORAL EVERY 6 HOURS PRN
Qty: 120 TABLET | Refills: 0 | Status: SHIPPED | OUTPATIENT
Start: 2018-02-09 | End: 2018-03-12 | Stop reason: SDUPTHER

## 2018-02-09 RX ORDER — METOPROLOL SUCCINATE 50 MG/1
25 TABLET, EXTENDED RELEASE ORAL DAILY
Qty: 45 TABLET | Refills: 1 | Status: SHIPPED | OUTPATIENT
Start: 2018-02-09 | End: 2018-06-25 | Stop reason: SDUPTHER

## 2018-02-09 RX ORDER — VENLAFAXINE HYDROCHLORIDE 150 MG/1
150 CAPSULE, EXTENDED RELEASE ORAL DAILY
Qty: 90 CAPSULE | Refills: 1 | Status: SHIPPED | OUTPATIENT
Start: 2018-02-09 | End: 2018-02-09 | Stop reason: SDUPTHER

## 2018-02-09 RX ORDER — GLIPIZIDE 5 MG/1
5 TABLET ORAL
Qty: 180 TABLET | Refills: 1 | Status: SHIPPED | OUTPATIENT
Start: 2018-02-09 | End: 2018-06-25 | Stop reason: SDUPTHER

## 2018-02-09 RX ORDER — NITROGLYCERIN 0.4 MG/1
0.4 TABLET SUBLINGUAL EVERY 5 MIN PRN
Qty: 25 TABLET | Refills: 1 | Status: SHIPPED | OUTPATIENT
Start: 2018-02-09 | End: 2018-02-21 | Stop reason: SDUPTHER

## 2018-02-09 RX ORDER — VENLAFAXINE HYDROCHLORIDE 150 MG/1
150 CAPSULE, EXTENDED RELEASE ORAL DAILY
Qty: 90 CAPSULE | Refills: 1 | Status: SHIPPED | OUTPATIENT
Start: 2018-02-09 | End: 2018-06-25 | Stop reason: SDUPTHER

## 2018-02-09 RX ORDER — SIMVASTATIN 40 MG
40 TABLET ORAL NIGHTLY
Qty: 90 TABLET | Refills: 1 | Status: SHIPPED | OUTPATIENT
Start: 2018-02-09 | End: 2018-06-25 | Stop reason: SDUPTHER

## 2018-02-09 RX ORDER — GABAPENTIN 300 MG/1
300 CAPSULE ORAL 3 TIMES DAILY
Qty: 90 CAPSULE | Refills: 0 | Status: SHIPPED | OUTPATIENT
Start: 2018-02-09 | End: 2018-03-12 | Stop reason: SDUPTHER

## 2018-02-09 RX ORDER — LISINOPRIL 40 MG/1
20 TABLET ORAL DAILY
Qty: 90 TABLET | Refills: 1 | Status: SHIPPED | OUTPATIENT
Start: 2018-02-09 | End: 2018-06-25 | Stop reason: SDUPTHER

## 2018-02-09 NOTE — TELEPHONE ENCOUNTER
From: Wilhelm Krabbe  Sent: 2/8/2018 3:11 PM CST  Subject: Medication Renewal Request    Wilhelm Krabbe would like a refill of the following medications:  HYDROcodone-acetaminophen ([facility])  MG per tablet MAGDALENA Salinas]  gabapentin (NEURONTIN) 300 MG capsule MAGDALENA Salinas]    Preferred pharmacy: [address] [place] [address] [phone] - F [phone]    Comment:

## 2018-02-21 RX ORDER — NITROGLYCERIN 0.4 MG/1
0.4 TABLET SUBLINGUAL EVERY 5 MIN PRN
Qty: 25 TABLET | Refills: 1 | Status: SHIPPED | OUTPATIENT
Start: 2018-02-21 | End: 2020-06-18 | Stop reason: SDUPTHER

## 2018-03-07 DIAGNOSIS — Z00.00 HEALTH CARE MAINTENANCE: ICD-10-CM

## 2018-03-07 RX ORDER — OMEPRAZOLE 40 MG/1
40 CAPSULE, DELAYED RELEASE ORAL DAILY
Qty: 90 CAPSULE | Refills: 1 | Status: SHIPPED | OUTPATIENT
Start: 2018-03-07 | End: 2018-06-25 | Stop reason: SDUPTHER

## 2018-03-07 RX ORDER — CLONIDINE HYDROCHLORIDE 0.1 MG/1
0.1 TABLET ORAL 2 TIMES DAILY
Qty: 180 TABLET | Refills: 1 | Status: SHIPPED | OUTPATIENT
Start: 2018-03-07 | End: 2018-06-25 | Stop reason: SDUPTHER

## 2018-03-12 ENCOUNTER — OFFICE VISIT (OUTPATIENT)
Dept: INTERNAL MEDICINE | Age: 58
End: 2018-03-12
Payer: MEDICARE

## 2018-03-12 VITALS
DIASTOLIC BLOOD PRESSURE: 84 MMHG | HEIGHT: 66 IN | SYSTOLIC BLOOD PRESSURE: 122 MMHG | WEIGHT: 178 LBS | BODY MASS INDEX: 28.61 KG/M2 | HEART RATE: 84 BPM | OXYGEN SATURATION: 96 % | RESPIRATION RATE: 16 BRPM

## 2018-03-12 DIAGNOSIS — R21 RASH: ICD-10-CM

## 2018-03-12 DIAGNOSIS — I10 ESSENTIAL HYPERTENSION: Primary | ICD-10-CM

## 2018-03-12 DIAGNOSIS — Z00.00 HEALTH CARE MAINTENANCE: ICD-10-CM

## 2018-03-12 DIAGNOSIS — G89.4 CHRONIC PAIN SYNDROME: ICD-10-CM

## 2018-03-12 DIAGNOSIS — E11.59 TYPE 2 DIABETES MELLITUS WITH OTHER CIRCULATORY COMPLICATION, WITH LONG-TERM CURRENT USE OF INSULIN (HCC): ICD-10-CM

## 2018-03-12 DIAGNOSIS — M89.9 DISORDER OF BONE: ICD-10-CM

## 2018-03-12 DIAGNOSIS — Z13.1 ENCOUNTER FOR SCREENING FOR DIABETES MELLITUS: ICD-10-CM

## 2018-03-12 DIAGNOSIS — Z79.4 TYPE 2 DIABETES MELLITUS WITH OTHER CIRCULATORY COMPLICATION, WITH LONG-TERM CURRENT USE OF INSULIN (HCC): ICD-10-CM

## 2018-03-12 PROCEDURE — 3046F HEMOGLOBIN A1C LEVEL >9.0%: CPT | Performed by: NURSE PRACTITIONER

## 2018-03-12 PROCEDURE — 4004F PT TOBACCO SCREEN RCVD TLK: CPT | Performed by: NURSE PRACTITIONER

## 2018-03-12 PROCEDURE — 99214 OFFICE O/P EST MOD 30 MIN: CPT | Performed by: NURSE PRACTITIONER

## 2018-03-12 PROCEDURE — G8427 DOCREV CUR MEDS BY ELIG CLIN: HCPCS | Performed by: NURSE PRACTITIONER

## 2018-03-12 PROCEDURE — G8417 CALC BMI ABV UP PARAM F/U: HCPCS | Performed by: NURSE PRACTITIONER

## 2018-03-12 PROCEDURE — G8484 FLU IMMUNIZE NO ADMIN: HCPCS | Performed by: NURSE PRACTITIONER

## 2018-03-12 PROCEDURE — 3014F SCREEN MAMMO DOC REV: CPT | Performed by: NURSE PRACTITIONER

## 2018-03-12 PROCEDURE — 3017F COLORECTAL CA SCREEN DOC REV: CPT | Performed by: NURSE PRACTITIONER

## 2018-03-12 RX ORDER — HYDROCODONE BITARTRATE AND ACETAMINOPHEN 10; 325 MG/1; MG/1
1 TABLET ORAL EVERY 6 HOURS PRN
Qty: 120 TABLET | Refills: 0 | Status: SHIPPED | OUTPATIENT
Start: 2018-03-12 | End: 2018-04-09 | Stop reason: SDUPTHER

## 2018-03-12 RX ORDER — GABAPENTIN 300 MG/1
300 CAPSULE ORAL 3 TIMES DAILY
Qty: 90 CAPSULE | Refills: 0 | Status: SHIPPED | OUTPATIENT
Start: 2018-03-12 | End: 2018-04-09 | Stop reason: SDUPTHER

## 2018-03-12 ASSESSMENT — ENCOUNTER SYMPTOMS
CHOKING: 0
ABDOMINAL DISTENTION: 0
EYE DISCHARGE: 0
VOMITING: 0
BACK PAIN: 1
BLOOD IN STOOL: 0
CONSTIPATION: 0
NAUSEA: 0
WHEEZING: 0
EYE ITCHING: 0
DIARRHEA: 0
TROUBLE SWALLOWING: 0
SORE THROAT: 0
STRIDOR: 0
ABDOMINAL PAIN: 0
COUGH: 0
SHORTNESS OF BREATH: 0
COLOR CHANGE: 0

## 2018-03-12 NOTE — PROGRESS NOTES
1960    Hepatitis C screen  1960    HIV screen  07/21/1977    DTaP/Tdap/Td vaccine (1 - Tdap) 07/21/1981    Pneumococcal med risk (1 of 1 - PPSV23) 07/21/1981    Cervical cancer screen  07/21/1983    Lipid screen  07/21/2002    Diabetes screen  07/21/2002    Shingles Vaccine (1 of 2 - 2 Dose Series) 07/21/2012    Colon cancer screen colonoscopy  07/21/2012    Smoker: low dose lung CT screening  07/21/2017    Flu vaccine (1) 09/01/2017    Breast cancer screen  09/22/2019       Subjective:      Review of Systems   Constitutional: Negative for fatigue, fever and unexpected weight change. HENT: Negative for ear discharge, ear pain, mouth sores, sore throat and trouble swallowing. Eyes: Negative for discharge, itching and visual disturbance. Respiratory: Negative for cough, choking, shortness of breath, wheezing and stridor. Cardiovascular: Negative for chest pain, palpitations and leg swelling. Gastrointestinal: Negative for abdominal distention, abdominal pain, blood in stool, constipation, diarrhea, nausea and vomiting. Endocrine: Negative for cold intolerance, polydipsia and polyuria. Genitourinary: Negative for difficulty urinating, dysuria, frequency and urgency. Musculoskeletal: Positive for back pain. Negative for arthralgias and gait problem. Skin: Positive for rash. Negative for color change. Allergic/Immunologic: Negative for food allergies and immunocompromised state. Neurological: Negative for dizziness, tremors, syncope, speech difficulty, weakness and headaches. Hematological: Negative for adenopathy. Does not bruise/bleed easily. Psychiatric/Behavioral: Negative for confusion and hallucinations. Objective:     Physical Exam   Constitutional: She is oriented to person, place, and time. She appears well-developed and well-nourished. No distress. HENT:   Head: Normocephalic and atraumatic. Eyes: Pupils are equal, round, and reactive to light.

## 2018-03-16 LAB
GRAM STAIN RESULT: ABNORMAL
ORGANISM: ABNORMAL
WOUND/ABSCESS: ABNORMAL
WOUND/ABSCESS: ABNORMAL

## 2018-04-09 DIAGNOSIS — Z00.00 HEALTH CARE MAINTENANCE: ICD-10-CM

## 2018-04-09 RX ORDER — HYDROCODONE BITARTRATE AND ACETAMINOPHEN 10; 325 MG/1; MG/1
1 TABLET ORAL EVERY 6 HOURS PRN
Qty: 120 TABLET | Refills: 0 | Status: SHIPPED | OUTPATIENT
Start: 2018-04-09 | End: 2018-05-08 | Stop reason: SDUPTHER

## 2018-04-09 RX ORDER — GABAPENTIN 300 MG/1
300 CAPSULE ORAL 3 TIMES DAILY
Qty: 90 CAPSULE | Refills: 0 | Status: SHIPPED | OUTPATIENT
Start: 2018-04-09 | End: 2018-05-08 | Stop reason: SDUPTHER

## 2018-05-08 DIAGNOSIS — Z00.00 HEALTH CARE MAINTENANCE: ICD-10-CM

## 2018-05-08 RX ORDER — HYDROCODONE BITARTRATE AND ACETAMINOPHEN 10; 325 MG/1; MG/1
1 TABLET ORAL EVERY 6 HOURS PRN
Qty: 120 TABLET | Refills: 0 | Status: SHIPPED | OUTPATIENT
Start: 2018-05-08 | End: 2018-06-08 | Stop reason: SDUPTHER

## 2018-05-08 RX ORDER — GABAPENTIN 300 MG/1
300 CAPSULE ORAL 3 TIMES DAILY
Qty: 90 CAPSULE | Refills: 0 | Status: SHIPPED | OUTPATIENT
Start: 2018-05-08 | End: 2018-06-08 | Stop reason: SDUPTHER

## 2018-06-08 ENCOUNTER — OFFICE VISIT (OUTPATIENT)
Dept: INTERNAL MEDICINE | Age: 58
End: 2018-06-08
Payer: MEDICARE

## 2018-06-08 VITALS
OXYGEN SATURATION: 94 % | HEIGHT: 65 IN | HEART RATE: 80 BPM | WEIGHT: 182.5 LBS | BODY MASS INDEX: 30.41 KG/M2 | DIASTOLIC BLOOD PRESSURE: 70 MMHG | SYSTOLIC BLOOD PRESSURE: 124 MMHG

## 2018-06-08 DIAGNOSIS — E11.9 TYPE 2 DIABETES MELLITUS WITHOUT COMPLICATION, WITH LONG-TERM CURRENT USE OF INSULIN (HCC): ICD-10-CM

## 2018-06-08 DIAGNOSIS — G89.4 CHRONIC PAIN SYNDROME: Primary | ICD-10-CM

## 2018-06-08 DIAGNOSIS — Z00.00 HEALTH CARE MAINTENANCE: ICD-10-CM

## 2018-06-08 DIAGNOSIS — N39.3 STRESS INCONTINENCE: ICD-10-CM

## 2018-06-08 DIAGNOSIS — E66.09 CLASS 1 OBESITY DUE TO EXCESS CALORIES WITH BODY MASS INDEX (BMI) OF 30.0 TO 30.9 IN ADULT, UNSPECIFIED WHETHER SERIOUS COMORBIDITY PRESENT: ICD-10-CM

## 2018-06-08 DIAGNOSIS — Z79.4 TYPE 2 DIABETES MELLITUS WITHOUT COMPLICATION, WITH LONG-TERM CURRENT USE OF INSULIN (HCC): ICD-10-CM

## 2018-06-08 DIAGNOSIS — E55.9 VITAMIN D DEFICIENCY: ICD-10-CM

## 2018-06-08 DIAGNOSIS — Z23 NEED FOR TDAP VACCINATION: ICD-10-CM

## 2018-06-08 DIAGNOSIS — E11.59 TYPE 2 DIABETES MELLITUS WITH OTHER CIRCULATORY COMPLICATION, WITH LONG-TERM CURRENT USE OF INSULIN (HCC): ICD-10-CM

## 2018-06-08 DIAGNOSIS — Z23 NEED FOR PROPHYLACTIC VACCINATION AGAINST STREPTOCOCCUS PNEUMONIAE (PNEUMOCOCCUS): ICD-10-CM

## 2018-06-08 DIAGNOSIS — I10 ESSENTIAL HYPERTENSION: ICD-10-CM

## 2018-06-08 DIAGNOSIS — Z79.4 TYPE 2 DIABETES MELLITUS WITH OTHER CIRCULATORY COMPLICATION, WITH LONG-TERM CURRENT USE OF INSULIN (HCC): ICD-10-CM

## 2018-06-08 DIAGNOSIS — Z13.1 ENCOUNTER FOR SCREENING FOR DIABETES MELLITUS: ICD-10-CM

## 2018-06-08 DIAGNOSIS — M89.9 DISORDER OF BONE: ICD-10-CM

## 2018-06-08 DIAGNOSIS — Z23 NEED FOR PROPHYLACTIC VACCINATION AGAINST DIPHTHERIA-TETANUS-PERTUSSIS (DTP): ICD-10-CM

## 2018-06-08 PROBLEM — E66.9 CLASS 1 OBESITY IN ADULT: Status: ACTIVE | Noted: 2018-06-08

## 2018-06-08 LAB
ALBUMIN SERPL-MCNC: 4 G/DL (ref 3.5–5.2)
ALP BLD-CCNC: 81 U/L (ref 35–104)
ALT SERPL-CCNC: 11 U/L (ref 5–33)
ANION GAP SERPL CALCULATED.3IONS-SCNC: 14 MMOL/L (ref 7–19)
AST SERPL-CCNC: 10 U/L (ref 5–32)
BILIRUB SERPL-MCNC: 0.3 MG/DL (ref 0.2–1.2)
BUN BLDV-MCNC: 7 MG/DL (ref 6–20)
CALCIUM SERPL-MCNC: 8.9 MG/DL (ref 8.6–10)
CHLORIDE BLD-SCNC: 99 MMOL/L (ref 98–111)
CHOLESTEROL, TOTAL: 219 MG/DL (ref 160–199)
CO2: 26 MMOL/L (ref 22–29)
CREAT SERPL-MCNC: 0.6 MG/DL (ref 0.5–0.9)
GFR NON-AFRICAN AMERICAN: >60
GLUCOSE BLD-MCNC: 234 MG/DL (ref 74–109)
HBA1C MFR BLD: 8.1 %
HCT VFR BLD CALC: 39.7 % (ref 37–47)
HDLC SERPL-MCNC: 52 MG/DL (ref 65–121)
HEMOGLOBIN: 12.9 G/DL (ref 12–16)
LDL CHOLESTEROL CALCULATED: 125 MG/DL
MCH RBC QN AUTO: 28.4 PG (ref 27–31)
MCHC RBC AUTO-ENTMCNC: 32.5 G/DL (ref 33–37)
MCV RBC AUTO: 87.4 FL (ref 81–99)
PDW BLD-RTO: 13.3 % (ref 11.5–14.5)
PLATELET # BLD: 268 K/UL (ref 130–400)
PMV BLD AUTO: 10.7 FL (ref 9.4–12.3)
POTASSIUM SERPL-SCNC: 3.6 MMOL/L (ref 3.5–5)
RBC # BLD: 4.54 M/UL (ref 4.2–5.4)
SODIUM BLD-SCNC: 139 MMOL/L (ref 136–145)
TOTAL PROTEIN: 7 G/DL (ref 6.6–8.7)
TRIGL SERPL-MCNC: 208 MG/DL (ref 0–149)
TSH SERPL DL<=0.05 MIU/L-ACNC: 2.81 UIU/ML (ref 0.27–4.2)
VITAMIN D 25-HYDROXY: 16.8 NG/ML
WBC # BLD: 7.6 K/UL (ref 4.8–10.8)

## 2018-06-08 PROCEDURE — 3017F COLORECTAL CA SCREEN DOC REV: CPT | Performed by: NURSE PRACTITIONER

## 2018-06-08 PROCEDURE — 99214 OFFICE O/P EST MOD 30 MIN: CPT | Performed by: NURSE PRACTITIONER

## 2018-06-08 PROCEDURE — 2022F DILAT RTA XM EVC RTNOPTHY: CPT | Performed by: NURSE PRACTITIONER

## 2018-06-08 PROCEDURE — G8427 DOCREV CUR MEDS BY ELIG CLIN: HCPCS | Performed by: NURSE PRACTITIONER

## 2018-06-08 PROCEDURE — G0009 ADMIN PNEUMOCOCCAL VACCINE: HCPCS | Performed by: NURSE PRACTITIONER

## 2018-06-08 PROCEDURE — G8417 CALC BMI ABV UP PARAM F/U: HCPCS | Performed by: NURSE PRACTITIONER

## 2018-06-08 PROCEDURE — 99401 PREV MED CNSL INDIV APPRX 15: CPT | Performed by: NURSE PRACTITIONER

## 2018-06-08 PROCEDURE — 3046F HEMOGLOBIN A1C LEVEL >9.0%: CPT | Performed by: NURSE PRACTITIONER

## 2018-06-08 PROCEDURE — 90732 PPSV23 VACC 2 YRS+ SUBQ/IM: CPT | Performed by: NURSE PRACTITIONER

## 2018-06-08 PROCEDURE — 4004F PT TOBACCO SCREEN RCVD TLK: CPT | Performed by: NURSE PRACTITIONER

## 2018-06-08 RX ORDER — HYDROCODONE BITARTRATE AND ACETAMINOPHEN 10; 325 MG/1; MG/1
1 TABLET ORAL EVERY 6 HOURS PRN
Qty: 120 TABLET | Refills: 0 | Status: SHIPPED | OUTPATIENT
Start: 2018-06-08 | End: 2018-07-06 | Stop reason: SDUPTHER

## 2018-06-08 RX ORDER — GABAPENTIN 300 MG/1
300 CAPSULE ORAL 3 TIMES DAILY
Qty: 90 CAPSULE | Refills: 0 | Status: SHIPPED | OUTPATIENT
Start: 2018-06-08 | End: 2018-07-06 | Stop reason: SDUPTHER

## 2018-06-08 ASSESSMENT — ENCOUNTER SYMPTOMS
EYE ITCHING: 0
VOMITING: 0
CONSTIPATION: 0
SORE THROAT: 0
EYE DISCHARGE: 0
SHORTNESS OF BREATH: 0
WHEEZING: 0
ABDOMINAL DISTENTION: 0
COLOR CHANGE: 0
STRIDOR: 0
DIARRHEA: 0
ABDOMINAL PAIN: 0
NAUSEA: 0
BACK PAIN: 1
BLOOD IN STOOL: 0
COUGH: 0
CHOKING: 0
TROUBLE SWALLOWING: 0

## 2018-06-25 ENCOUNTER — TELEPHONE (OUTPATIENT)
Dept: INTERNAL MEDICINE | Age: 58
End: 2018-06-25

## 2018-06-25 DIAGNOSIS — Z00.00 HEALTH CARE MAINTENANCE: ICD-10-CM

## 2018-06-25 DIAGNOSIS — E78.2 MIXED HYPERLIPIDEMIA: ICD-10-CM

## 2018-06-25 DIAGNOSIS — E55.9 VITAMIN D DEFICIENCY: ICD-10-CM

## 2018-06-25 RX ORDER — GLIPIZIDE 5 MG/1
5 TABLET ORAL
Qty: 180 TABLET | Refills: 1 | Status: SHIPPED | OUTPATIENT
Start: 2018-06-25 | End: 2018-09-27 | Stop reason: SDUPTHER

## 2018-06-25 RX ORDER — VENLAFAXINE HYDROCHLORIDE 150 MG/1
150 CAPSULE, EXTENDED RELEASE ORAL DAILY
Qty: 90 CAPSULE | Refills: 1 | Status: SHIPPED | OUTPATIENT
Start: 2018-06-25 | End: 2018-09-27 | Stop reason: SDUPTHER

## 2018-06-25 RX ORDER — IBUPROFEN 600 MG/1
600 TABLET ORAL EVERY 8 HOURS PRN
Qty: 120 TABLET | Refills: 1 | Status: SHIPPED | OUTPATIENT
Start: 2018-06-25 | End: 2018-09-04 | Stop reason: SDUPTHER

## 2018-06-25 RX ORDER — METOPROLOL SUCCINATE 50 MG/1
25 TABLET, EXTENDED RELEASE ORAL DAILY
Qty: 45 TABLET | Refills: 1 | Status: SHIPPED | OUTPATIENT
Start: 2018-06-25 | End: 2018-06-25 | Stop reason: SDUPTHER

## 2018-06-25 RX ORDER — OMEPRAZOLE 40 MG/1
40 CAPSULE, DELAYED RELEASE ORAL DAILY
Qty: 90 CAPSULE | Refills: 1 | Status: SHIPPED | OUTPATIENT
Start: 2018-06-25 | End: 2018-09-27 | Stop reason: SDUPTHER

## 2018-06-25 RX ORDER — SIMVASTATIN 40 MG
40 TABLET ORAL NIGHTLY
Qty: 90 TABLET | Refills: 1 | Status: SHIPPED | OUTPATIENT
Start: 2018-06-25 | End: 2019-01-31 | Stop reason: SDUPTHER

## 2018-06-25 RX ORDER — LISINOPRIL 20 MG/1
20 TABLET ORAL DAILY
Qty: 30 TABLET | Refills: 3 | Status: SHIPPED | OUTPATIENT
Start: 2018-06-25 | End: 2019-01-29 | Stop reason: SDUPTHER

## 2018-06-25 RX ORDER — CHLORAL HYDRATE 500 MG
1000 CAPSULE ORAL 2 TIMES DAILY
Qty: 90 CAPSULE | Refills: 3 | Status: SHIPPED | OUTPATIENT
Start: 2018-06-25 | End: 2018-11-28 | Stop reason: SDUPTHER

## 2018-06-25 RX ORDER — CLONIDINE HYDROCHLORIDE 0.1 MG/1
0.1 TABLET ORAL 2 TIMES DAILY
Qty: 180 TABLET | Refills: 1 | Status: SHIPPED | OUTPATIENT
Start: 2018-06-25 | End: 2018-09-27 | Stop reason: SDUPTHER

## 2018-06-25 RX ORDER — LISINOPRIL 40 MG/1
20 TABLET ORAL DAILY
Qty: 90 TABLET | Refills: 1 | Status: SHIPPED | OUTPATIENT
Start: 2018-06-25 | End: 2018-06-25 | Stop reason: SDUPTHER

## 2018-06-25 RX ORDER — ERGOCALCIFEROL 1.25 MG/1
50000 CAPSULE ORAL
Qty: 8 CAPSULE | Refills: 2 | Status: SHIPPED | OUTPATIENT
Start: 2018-06-25 | End: 2018-09-04 | Stop reason: SDUPTHER

## 2018-06-25 RX ORDER — METOPROLOL SUCCINATE 25 MG/1
25 TABLET, EXTENDED RELEASE ORAL DAILY
Qty: 30 TABLET | Refills: 3 | Status: SHIPPED | OUTPATIENT
Start: 2018-06-25 | End: 2018-10-03 | Stop reason: SDUPTHER

## 2018-06-26 RX ORDER — LISINOPRIL 20 MG/1
20 TABLET ORAL DAILY
Qty: 30 TABLET | Refills: 3 | Status: SHIPPED | OUTPATIENT
Start: 2018-06-26 | End: 2018-10-03 | Stop reason: SDUPTHER

## 2018-07-06 DIAGNOSIS — G89.4 CHRONIC PAIN SYNDROME: ICD-10-CM

## 2018-07-06 NOTE — TELEPHONE ENCOUNTER
From: Nicolette Plata  Sent: 7/4/2018 11:40 AM CDT  Subject: Medication Renewal Request    Nicolette Plata would like a refill of the following medications:     HYDROcodone-acetaminophen ([facility])  MG per tablet Joseph Medeiros, APRN]     gabapentin (NEURONTIN) 300 MG capsule Joseph Medeiros, APRN]    Preferred pharmacy: [address] [place] [address], [address] [place] [address]

## 2018-07-09 RX ORDER — GABAPENTIN 300 MG/1
300 CAPSULE ORAL 3 TIMES DAILY
Qty: 90 CAPSULE | Refills: 0 | Status: SHIPPED | OUTPATIENT
Start: 2018-07-09 | End: 2018-08-07 | Stop reason: SDUPTHER

## 2018-07-09 RX ORDER — HYDROCODONE BITARTRATE AND ACETAMINOPHEN 10; 325 MG/1; MG/1
1 TABLET ORAL EVERY 6 HOURS PRN
Qty: 120 TABLET | Refills: 0 | Status: SHIPPED | OUTPATIENT
Start: 2018-07-09 | End: 2018-08-07 | Stop reason: SDUPTHER

## 2018-08-07 DIAGNOSIS — G89.4 CHRONIC PAIN SYNDROME: ICD-10-CM

## 2018-08-08 RX ORDER — HYDROCODONE BITARTRATE AND ACETAMINOPHEN 10; 325 MG/1; MG/1
1 TABLET ORAL EVERY 6 HOURS PRN
Qty: 120 TABLET | Refills: 0 | Status: SHIPPED | OUTPATIENT
Start: 2018-08-08 | End: 2018-09-06 | Stop reason: SDUPTHER

## 2018-08-08 RX ORDER — GABAPENTIN 300 MG/1
300 CAPSULE ORAL 3 TIMES DAILY
Qty: 90 CAPSULE | Refills: 0 | Status: SHIPPED | OUTPATIENT
Start: 2018-08-08 | End: 2018-09-06 | Stop reason: SDUPTHER

## 2018-09-04 DIAGNOSIS — Z00.00 HEALTH CARE MAINTENANCE: ICD-10-CM

## 2018-09-04 DIAGNOSIS — E55.9 VITAMIN D DEFICIENCY: ICD-10-CM

## 2018-09-04 RX ORDER — ERGOCALCIFEROL 1.25 MG/1
CAPSULE ORAL
Qty: 8 CAPSULE | Refills: 1 | Status: SHIPPED | OUTPATIENT
Start: 2018-09-04 | End: 2018-10-03 | Stop reason: SDUPTHER

## 2018-09-04 RX ORDER — IBUPROFEN 600 MG/1
TABLET ORAL
Qty: 120 TABLET | Refills: 0 | Status: SHIPPED | OUTPATIENT
Start: 2018-09-04 | End: 2019-03-19

## 2018-09-06 DIAGNOSIS — G89.4 CHRONIC PAIN SYNDROME: ICD-10-CM

## 2018-09-06 RX ORDER — HYDROCODONE BITARTRATE AND ACETAMINOPHEN 10; 325 MG/1; MG/1
1 TABLET ORAL EVERY 6 HOURS PRN
Qty: 120 TABLET | Refills: 0 | Status: SHIPPED | OUTPATIENT
Start: 2018-09-06 | End: 2018-10-04 | Stop reason: SDUPTHER

## 2018-09-06 RX ORDER — GABAPENTIN 300 MG/1
300 CAPSULE ORAL 3 TIMES DAILY
Qty: 90 CAPSULE | Refills: 0 | Status: SHIPPED | OUTPATIENT
Start: 2018-09-06 | End: 2018-10-04 | Stop reason: SDUPTHER

## 2018-09-06 NOTE — TELEPHONE ENCOUNTER
From: Ismael Young  Sent: 9/6/2018 9:14 AM CDT  Subject: Medication Renewal Request    Ismael Young would like a refill of the following medications:     HYDROcodone-acetaminophen ([facility])  MG per tablet Ricardo Olivera, APRN]     gabapentin (NEURONTIN) 300 MG capsule Ricardo Olivera, APRN]    Preferred pharmacy: [address] [place] [address], [address] [place] [address]

## 2018-09-27 DIAGNOSIS — Z00.00 HEALTH CARE MAINTENANCE: ICD-10-CM

## 2018-09-27 RX ORDER — OMEPRAZOLE 40 MG/1
CAPSULE, DELAYED RELEASE ORAL
Qty: 90 CAPSULE | Refills: 3 | Status: SHIPPED | OUTPATIENT
Start: 2018-09-27 | End: 2019-04-01 | Stop reason: SDUPTHER

## 2018-09-27 RX ORDER — CLONIDINE HYDROCHLORIDE 0.1 MG/1
TABLET ORAL
Qty: 180 TABLET | Refills: 3 | Status: SHIPPED | OUTPATIENT
Start: 2018-09-27 | End: 2019-10-25 | Stop reason: SDUPTHER

## 2018-09-27 RX ORDER — VENLAFAXINE HYDROCHLORIDE 150 MG/1
CAPSULE, EXTENDED RELEASE ORAL
Qty: 90 CAPSULE | Refills: 3 | Status: SHIPPED | OUTPATIENT
Start: 2018-09-27 | End: 2019-04-01 | Stop reason: SDUPTHER

## 2018-09-27 RX ORDER — GLIPIZIDE 5 MG/1
TABLET ORAL
Qty: 180 TABLET | Refills: 3 | Status: SHIPPED | OUTPATIENT
Start: 2018-09-27 | End: 2019-04-01 | Stop reason: SDUPTHER

## 2018-09-27 RX ORDER — SITAGLIPTIN 100 MG/1
TABLET, FILM COATED ORAL
Qty: 90 TABLET | Refills: 3 | Status: SHIPPED | OUTPATIENT
Start: 2018-09-27 | End: 2019-02-25 | Stop reason: ALTCHOICE

## 2018-10-03 DIAGNOSIS — E55.9 VITAMIN D DEFICIENCY: ICD-10-CM

## 2018-10-03 DIAGNOSIS — Z00.00 HEALTH CARE MAINTENANCE: ICD-10-CM

## 2018-10-04 DIAGNOSIS — G89.4 CHRONIC PAIN SYNDROME: ICD-10-CM

## 2018-10-05 ENCOUNTER — OFFICE VISIT (OUTPATIENT)
Dept: INTERNAL MEDICINE | Age: 58
End: 2018-10-05
Payer: MEDICARE

## 2018-10-05 VITALS
DIASTOLIC BLOOD PRESSURE: 82 MMHG | BODY MASS INDEX: 31.16 KG/M2 | HEART RATE: 85 BPM | WEIGHT: 187 LBS | OXYGEN SATURATION: 95 % | HEIGHT: 65 IN | RESPIRATION RATE: 16 BRPM | SYSTOLIC BLOOD PRESSURE: 106 MMHG

## 2018-10-05 DIAGNOSIS — Z00.00 HEALTH CARE MAINTENANCE: ICD-10-CM

## 2018-10-05 DIAGNOSIS — Z79.4 TYPE 2 DIABETES MELLITUS WITHOUT COMPLICATION, WITH LONG-TERM CURRENT USE OF INSULIN (HCC): ICD-10-CM

## 2018-10-05 DIAGNOSIS — F41.9 ANXIETY: ICD-10-CM

## 2018-10-05 DIAGNOSIS — M89.9 DISORDER OF BONE: ICD-10-CM

## 2018-10-05 DIAGNOSIS — E11.9 TYPE 2 DIABETES MELLITUS WITHOUT COMPLICATION, WITHOUT LONG-TERM CURRENT USE OF INSULIN (HCC): ICD-10-CM

## 2018-10-05 DIAGNOSIS — I10 ESSENTIAL HYPERTENSION: ICD-10-CM

## 2018-10-05 DIAGNOSIS — Z23 NEED FOR PROPHYLACTIC VACCINATION AND INOCULATION AGAINST VARICELLA: Primary | ICD-10-CM

## 2018-10-05 DIAGNOSIS — Z79.4 TYPE 2 DIABETES MELLITUS WITH OTHER CIRCULATORY COMPLICATION, WITH LONG-TERM CURRENT USE OF INSULIN (HCC): ICD-10-CM

## 2018-10-05 DIAGNOSIS — E55.9 VITAMIN D DEFICIENCY: ICD-10-CM

## 2018-10-05 DIAGNOSIS — E11.9 TYPE 2 DIABETES MELLITUS WITHOUT COMPLICATION, WITH LONG-TERM CURRENT USE OF INSULIN (HCC): ICD-10-CM

## 2018-10-05 DIAGNOSIS — E11.59 TYPE 2 DIABETES MELLITUS WITH OTHER CIRCULATORY COMPLICATION, WITH LONG-TERM CURRENT USE OF INSULIN (HCC): ICD-10-CM

## 2018-10-05 DIAGNOSIS — Z23 NEED FOR INFLUENZA VACCINATION: ICD-10-CM

## 2018-10-05 DIAGNOSIS — R21 RASH: ICD-10-CM

## 2018-10-05 DIAGNOSIS — Z00.00 HEALTHCARE MAINTENANCE: ICD-10-CM

## 2018-10-05 LAB
ALBUMIN SERPL-MCNC: 4.3 G/DL (ref 3.5–5.2)
ALP BLD-CCNC: 74 U/L (ref 35–104)
ALT SERPL-CCNC: 8 U/L (ref 5–33)
ANION GAP SERPL CALCULATED.3IONS-SCNC: 13 MMOL/L (ref 7–19)
AST SERPL-CCNC: 11 U/L (ref 5–32)
BILIRUB SERPL-MCNC: 0.4 MG/DL (ref 0.2–1.2)
BUN BLDV-MCNC: 11 MG/DL (ref 6–20)
CALCIUM SERPL-MCNC: 9.4 MG/DL (ref 8.6–10)
CHLORIDE BLD-SCNC: 97 MMOL/L (ref 98–111)
CO2: 30 MMOL/L (ref 22–29)
CREAT SERPL-MCNC: 0.9 MG/DL (ref 0.5–0.9)
CREATININE URINE: 147.1 MG/DL (ref 4.2–622)
GFR NON-AFRICAN AMERICAN: >60
GLUCOSE BLD-MCNC: 221 MG/DL (ref 74–109)
HBA1C MFR BLD: 8.3 % (ref 4–6)
HCT VFR BLD CALC: 39.5 % (ref 37–47)
HEMOGLOBIN: 12.8 G/DL (ref 12–16)
HEPATITIS C ANTIBODY INTERPRETATION: NORMAL
MCH RBC QN AUTO: 28.2 PG (ref 27–31)
MCHC RBC AUTO-ENTMCNC: 32.4 G/DL (ref 33–37)
MCV RBC AUTO: 87 FL (ref 81–99)
MICROALBUMIN UR-MCNC: <1.2 MG/DL (ref 0–19)
MICROALBUMIN/CREAT UR-RTO: NORMAL MG/G
PDW BLD-RTO: 13.2 % (ref 11.5–14.5)
PLATELET # BLD: 273 K/UL (ref 130–400)
PMV BLD AUTO: 10.9 FL (ref 9.4–12.3)
POTASSIUM SERPL-SCNC: 3.9 MMOL/L (ref 3.5–5)
RBC # BLD: 4.54 M/UL (ref 4.2–5.4)
SODIUM BLD-SCNC: 140 MMOL/L (ref 136–145)
TOTAL PROTEIN: 7.5 G/DL (ref 6.6–8.7)
VITAMIN D 25-HYDROXY: 46.5 NG/ML
WBC # BLD: 7.1 K/UL (ref 4.8–10.8)

## 2018-10-05 PROCEDURE — G8482 FLU IMMUNIZE ORDER/ADMIN: HCPCS | Performed by: NURSE PRACTITIONER

## 2018-10-05 PROCEDURE — 4004F PT TOBACCO SCREEN RCVD TLK: CPT | Performed by: NURSE PRACTITIONER

## 2018-10-05 PROCEDURE — 2022F DILAT RTA XM EVC RTNOPTHY: CPT | Performed by: NURSE PRACTITIONER

## 2018-10-05 PROCEDURE — 3045F PR MOST RECENT HEMOGLOBIN A1C LEVEL 7.0-9.0%: CPT | Performed by: NURSE PRACTITIONER

## 2018-10-05 PROCEDURE — G0008 ADMIN INFLUENZA VIRUS VAC: HCPCS | Performed by: NURSE PRACTITIONER

## 2018-10-05 PROCEDURE — 99214 OFFICE O/P EST MOD 30 MIN: CPT | Performed by: NURSE PRACTITIONER

## 2018-10-05 PROCEDURE — 90662 IIV NO PRSV INCREASED AG IM: CPT | Performed by: NURSE PRACTITIONER

## 2018-10-05 PROCEDURE — G8417 CALC BMI ABV UP PARAM F/U: HCPCS | Performed by: NURSE PRACTITIONER

## 2018-10-05 PROCEDURE — G8427 DOCREV CUR MEDS BY ELIG CLIN: HCPCS | Performed by: NURSE PRACTITIONER

## 2018-10-05 PROCEDURE — 3017F COLORECTAL CA SCREEN DOC REV: CPT | Performed by: NURSE PRACTITIONER

## 2018-10-05 RX ORDER — ERGOCALCIFEROL 1.25 MG/1
CAPSULE ORAL
Qty: 8 CAPSULE | Refills: 0 | Status: SHIPPED | OUTPATIENT
Start: 2018-10-05 | End: 2018-11-28 | Stop reason: SDUPTHER

## 2018-10-05 RX ORDER — HYDROCODONE BITARTRATE AND ACETAMINOPHEN 10; 325 MG/1; MG/1
1 TABLET ORAL EVERY 6 HOURS PRN
Qty: 120 TABLET | Refills: 0 | Status: SHIPPED | OUTPATIENT
Start: 2018-10-05 | End: 2018-10-31 | Stop reason: SDUPTHER

## 2018-10-05 RX ORDER — LISINOPRIL 20 MG/1
TABLET ORAL
Qty: 30 TABLET | Refills: 0 | Status: SHIPPED | OUTPATIENT
Start: 2018-10-05 | End: 2018-11-28 | Stop reason: SDUPTHER

## 2018-10-05 RX ORDER — METOPROLOL SUCCINATE 25 MG/1
TABLET, EXTENDED RELEASE ORAL
Qty: 30 TABLET | Refills: 0 | Status: SHIPPED | OUTPATIENT
Start: 2018-10-05 | End: 2018-11-28 | Stop reason: SDUPTHER

## 2018-10-05 RX ORDER — GABAPENTIN 300 MG/1
300 CAPSULE ORAL 3 TIMES DAILY
Qty: 90 CAPSULE | Refills: 0 | Status: SHIPPED | OUTPATIENT
Start: 2018-10-05 | End: 2018-10-31 | Stop reason: SDUPTHER

## 2018-10-05 RX ORDER — LORAZEPAM 0.5 MG/1
0.25 TABLET ORAL 2 TIMES DAILY PRN
Qty: 30 TABLET | Refills: 0 | Status: SHIPPED | OUTPATIENT
Start: 2018-10-05 | End: 2018-10-31 | Stop reason: SDUPTHER

## 2018-10-05 ASSESSMENT — ENCOUNTER SYMPTOMS
VOMITING: 0
BACK PAIN: 1
COUGH: 0
WHEEZING: 0
DIARRHEA: 0
BLOOD IN STOOL: 0
CONSTIPATION: 0
SHORTNESS OF BREATH: 0
STRIDOR: 0
TROUBLE SWALLOWING: 0
SORE THROAT: 0
ABDOMINAL PAIN: 0
EYE DISCHARGE: 0
ABDOMINAL DISTENTION: 0
NAUSEA: 0
EYE ITCHING: 0
COLOR CHANGE: 0
CHOKING: 0

## 2018-10-05 ASSESSMENT — PATIENT HEALTH QUESTIONNAIRE - PHQ9
SUM OF ALL RESPONSES TO PHQ QUESTIONS 1-9: 0
SUM OF ALL RESPONSES TO PHQ9 QUESTIONS 1 & 2: 0
1. LITTLE INTEREST OR PLEASURE IN DOING THINGS: 0
2. FEELING DOWN, DEPRESSED OR HOPELESS: 0
SUM OF ALL RESPONSES TO PHQ QUESTIONS 1-9: 0

## 2018-10-05 NOTE — PROGRESS NOTES
hallucinations. The patient is hyperactive. Depression        Objective:     Physical Exam   Constitutional: She is oriented to person, place, and time. She appears well-developed and well-nourished. No distress. HENT:   Head: Normocephalic and atraumatic. Eyes: Pupils are equal, round, and reactive to light. Right eye exhibits no discharge. Left eye exhibits no discharge. No scleral icterus. Neck: Normal range of motion. Neck supple. No JVD present. No thyromegaly present. Cardiovascular: Normal rate, regular rhythm and normal heart sounds. No murmur heard. Pulmonary/Chest: Effort normal and breath sounds normal. No respiratory distress. She has no wheezes. She has no rales. Abdominal: Soft. Bowel sounds are normal. She exhibits no distension and no mass. There is no tenderness. There is no rebound and no guarding. Musculoskeletal: Normal range of motion. She exhibits no edema or tenderness. Neurological: She is alert and oriented to person, place, and time. She has normal reflexes. No cranial nerve deficit. Coordination normal.   Skin: Skin is warm and dry. Rash (with scarring;  ) noted. No erythema. Psychiatric: She has a normal mood and affect. Her behavior is normal. Judgment and thought content normal. She does not exhibit a depressed mood. /82   Pulse 85   Resp 16   Ht 5' 5\" (1.651 m)   Wt 187 lb (84.8 kg)   SpO2 95%   BMI 31.12 kg/m²     Assessment:       Diagnosis Orders   1. Need for prophylactic vaccination and inoculation against varicella  zoster recombinant adjuvanted vaccine (SHINGRIX) 50 MCG SUSR injection   2. Type 2 diabetes mellitus without complication, without long-term current use of insulin ([facility])   DIABETES FOOT EXAM    CBC    Comprehensive Metabolic Panel    Hemoglobin A1C   3. Anxiety  LORazepam (ATIVAN) 0.5 MG tablet   4. Rash     5.  Healthcare maintenance  CBC    Comprehensive Metabolic Panel    Hemoglobin A1C    Vitamin D 25 Hydroxy    TSH

## 2018-10-05 NOTE — PATIENT INSTRUCTIONS
1.  HTn  Stable for now   2. Anxiety we will use low dose lorazepam for a short course 1 month only   3.  tyep II DM; We will get a1c today   4,  Rash   You have to get to dermatologist and let them scrape those areas and give you treatment;   We have tried everything I know to do     Fu in 3 months with labs

## 2018-10-06 LAB — RAPID HIV 1&2: NORMAL

## 2018-10-31 DIAGNOSIS — G89.4 CHRONIC PAIN SYNDROME: ICD-10-CM

## 2018-10-31 DIAGNOSIS — F41.9 ANXIETY: ICD-10-CM

## 2018-10-31 RX ORDER — LORAZEPAM 0.5 MG/1
0.25 TABLET ORAL 2 TIMES DAILY PRN
Qty: 30 TABLET | Refills: 0 | Status: SHIPPED | OUTPATIENT
Start: 2018-11-02 | End: 2018-11-28 | Stop reason: SDUPTHER

## 2018-10-31 RX ORDER — HYDROCODONE BITARTRATE AND ACETAMINOPHEN 10; 325 MG/1; MG/1
1 TABLET ORAL EVERY 6 HOURS PRN
Qty: 120 TABLET | Refills: 0 | Status: SHIPPED | OUTPATIENT
Start: 2018-11-02 | End: 2018-11-28 | Stop reason: SDUPTHER

## 2018-10-31 RX ORDER — GABAPENTIN 300 MG/1
300 CAPSULE ORAL 3 TIMES DAILY
Qty: 90 CAPSULE | Refills: 0 | Status: SHIPPED | OUTPATIENT
Start: 2018-11-02 | End: 2018-11-28 | Stop reason: SDUPTHER

## 2018-11-28 DIAGNOSIS — E78.2 MIXED HYPERLIPIDEMIA: ICD-10-CM

## 2018-11-28 DIAGNOSIS — F41.9 ANXIETY: ICD-10-CM

## 2018-11-28 DIAGNOSIS — E55.9 VITAMIN D DEFICIENCY: ICD-10-CM

## 2018-11-28 DIAGNOSIS — Z00.00 HEALTH CARE MAINTENANCE: ICD-10-CM

## 2018-11-28 DIAGNOSIS — G89.4 CHRONIC PAIN SYNDROME: ICD-10-CM

## 2018-11-28 RX ORDER — ERGOCALCIFEROL 1.25 MG/1
CAPSULE ORAL
Qty: 8 CAPSULE | Refills: 1 | Status: SHIPPED | OUTPATIENT
Start: 2018-11-28 | End: 2018-12-27 | Stop reason: SDUPTHER

## 2018-11-28 RX ORDER — HYDROCODONE BITARTRATE AND ACETAMINOPHEN 10; 325 MG/1; MG/1
1 TABLET ORAL EVERY 6 HOURS PRN
Qty: 120 TABLET | Refills: 0 | Status: SHIPPED | OUTPATIENT
Start: 2018-12-01 | End: 2018-12-31 | Stop reason: SDUPTHER

## 2018-11-28 RX ORDER — LORAZEPAM 0.5 MG/1
0.25 TABLET ORAL 2 TIMES DAILY PRN
Qty: 30 TABLET | Refills: 0 | Status: SHIPPED | OUTPATIENT
Start: 2018-12-01 | End: 2018-12-31 | Stop reason: SDUPTHER

## 2018-11-28 RX ORDER — IBUPROFEN 600 MG/1
TABLET ORAL
Qty: 120 TABLET | Refills: 1 | Status: SHIPPED | OUTPATIENT
Start: 2018-11-28 | End: 2019-01-25 | Stop reason: SDUPTHER

## 2018-11-28 RX ORDER — METOPROLOL SUCCINATE 25 MG/1
TABLET, EXTENDED RELEASE ORAL
Qty: 30 TABLET | Refills: 1 | Status: SHIPPED | OUTPATIENT
Start: 2018-11-28 | End: 2018-12-27 | Stop reason: SDUPTHER

## 2018-11-28 RX ORDER — GABAPENTIN 300 MG/1
300 CAPSULE ORAL 3 TIMES DAILY
Qty: 90 CAPSULE | Refills: 0 | Status: SHIPPED | OUTPATIENT
Start: 2018-12-01 | End: 2018-12-31 | Stop reason: SDUPTHER

## 2018-11-28 RX ORDER — CHLORAL HYDRATE 500 MG
CAPSULE ORAL
Qty: 90 CAPSULE | Refills: 1 | Status: SHIPPED | OUTPATIENT
Start: 2018-11-28 | End: 2019-02-19 | Stop reason: SDUPTHER

## 2018-11-28 RX ORDER — LISINOPRIL 20 MG/1
TABLET ORAL
Qty: 30 TABLET | Refills: 1 | Status: SHIPPED | OUTPATIENT
Start: 2018-11-28 | End: 2018-12-27 | Stop reason: SDUPTHER

## 2018-12-27 ENCOUNTER — TELEPHONE (OUTPATIENT)
Dept: INTERNAL MEDICINE | Age: 58
End: 2018-12-27

## 2018-12-27 DIAGNOSIS — Z00.00 HEALTH CARE MAINTENANCE: ICD-10-CM

## 2018-12-27 DIAGNOSIS — E55.9 VITAMIN D DEFICIENCY: ICD-10-CM

## 2018-12-27 RX ORDER — LISINOPRIL 20 MG/1
TABLET ORAL
Qty: 30 TABLET | Refills: 3 | Status: SHIPPED | OUTPATIENT
Start: 2018-12-27 | End: 2019-07-02

## 2018-12-27 RX ORDER — METOPROLOL SUCCINATE 25 MG/1
TABLET, EXTENDED RELEASE ORAL
Qty: 30 TABLET | Refills: 3 | Status: SHIPPED | OUTPATIENT
Start: 2018-12-27 | End: 2019-04-01 | Stop reason: SDUPTHER

## 2018-12-27 RX ORDER — ERGOCALCIFEROL 1.25 MG/1
CAPSULE ORAL
Qty: 8 CAPSULE | Refills: 3 | Status: SHIPPED | OUTPATIENT
Start: 2018-12-27 | End: 2020-06-18 | Stop reason: SDUPTHER

## 2018-12-31 DIAGNOSIS — F41.9 ANXIETY: ICD-10-CM

## 2018-12-31 DIAGNOSIS — G89.4 CHRONIC PAIN SYNDROME: ICD-10-CM

## 2018-12-31 RX ORDER — GABAPENTIN 300 MG/1
300 CAPSULE ORAL 3 TIMES DAILY
Qty: 90 CAPSULE | Refills: 0 | Status: SHIPPED | OUTPATIENT
Start: 2018-12-31 | End: 2019-02-04 | Stop reason: SDUPTHER

## 2018-12-31 RX ORDER — LORAZEPAM 0.5 MG/1
0.25 TABLET ORAL 2 TIMES DAILY PRN
Qty: 30 TABLET | Refills: 0 | Status: SHIPPED | OUTPATIENT
Start: 2018-12-31 | End: 2019-02-04 | Stop reason: SDUPTHER

## 2018-12-31 RX ORDER — HYDROCODONE BITARTRATE AND ACETAMINOPHEN 10; 325 MG/1; MG/1
1 TABLET ORAL EVERY 6 HOURS PRN
Qty: 120 TABLET | Refills: 0 | Status: SHIPPED | OUTPATIENT
Start: 2018-12-31 | End: 2019-01-31 | Stop reason: SDUPTHER

## 2019-01-07 ENCOUNTER — CARE COORDINATION (OUTPATIENT)
Dept: CARE COORDINATION | Age: 59
End: 2019-01-07

## 2019-01-28 RX ORDER — IBUPROFEN 600 MG/1
TABLET ORAL
Qty: 120 TABLET | Refills: 1 | Status: SHIPPED | OUTPATIENT
Start: 2019-01-28 | End: 2019-01-29 | Stop reason: SDUPTHER

## 2019-01-29 ENCOUNTER — OFFICE VISIT (OUTPATIENT)
Dept: INTERNAL MEDICINE | Age: 59
End: 2019-01-29
Payer: MEDICARE

## 2019-01-29 VITALS
SYSTOLIC BLOOD PRESSURE: 122 MMHG | HEART RATE: 72 BPM | HEIGHT: 65 IN | BODY MASS INDEX: 31.49 KG/M2 | WEIGHT: 189 LBS | DIASTOLIC BLOOD PRESSURE: 86 MMHG | OXYGEN SATURATION: 96 % | RESPIRATION RATE: 16 BRPM

## 2019-01-29 DIAGNOSIS — Z00.00 HEALTHCARE MAINTENANCE: ICD-10-CM

## 2019-01-29 DIAGNOSIS — M89.9 DISORDER OF BONE: ICD-10-CM

## 2019-01-29 DIAGNOSIS — M85.89 OSTEOPENIA OF MULTIPLE SITES: ICD-10-CM

## 2019-01-29 DIAGNOSIS — I10 ESSENTIAL HYPERTENSION: ICD-10-CM

## 2019-01-29 DIAGNOSIS — Z00.00 HEALTHCARE MAINTENANCE: Primary | ICD-10-CM

## 2019-01-29 DIAGNOSIS — M79.671 FOOT PAIN, BILATERAL: ICD-10-CM

## 2019-01-29 DIAGNOSIS — M79.672 FOOT PAIN, BILATERAL: ICD-10-CM

## 2019-01-29 DIAGNOSIS — M51.36 LUMBAR DEGENERATIVE DISC DISEASE: ICD-10-CM

## 2019-01-29 DIAGNOSIS — G89.4 CHRONIC PAIN SYNDROME: ICD-10-CM

## 2019-01-29 LAB
ALBUMIN SERPL-MCNC: 4.1 G/DL (ref 3.5–5.2)
ALP BLD-CCNC: 76 U/L (ref 35–104)
ALT SERPL-CCNC: 12 U/L (ref 5–33)
ANION GAP SERPL CALCULATED.3IONS-SCNC: 16 MMOL/L (ref 7–19)
AST SERPL-CCNC: 13 U/L (ref 5–32)
BASOPHILS ABSOLUTE: 0 K/UL (ref 0–0.2)
BASOPHILS RELATIVE PERCENT: 0.4 % (ref 0–1)
BILIRUB SERPL-MCNC: 0.3 MG/DL (ref 0.2–1.2)
BUN BLDV-MCNC: 12 MG/DL (ref 6–20)
CALCIUM SERPL-MCNC: 9.3 MG/DL (ref 8.6–10)
CHLORIDE BLD-SCNC: 96 MMOL/L (ref 98–111)
CO2: 27 MMOL/L (ref 22–29)
CREAT SERPL-MCNC: 0.7 MG/DL (ref 0.5–0.9)
EOSINOPHILS ABSOLUTE: 0 K/UL (ref 0–0.6)
EOSINOPHILS RELATIVE PERCENT: 0.1 % (ref 0–5)
GFR NON-AFRICAN AMERICAN: >60
GLUCOSE BLD-MCNC: 357 MG/DL (ref 74–109)
HBA1C MFR BLD: 9.2 % (ref 4–6)
HCT VFR BLD CALC: 38.9 % (ref 37–47)
HEMOGLOBIN: 12.6 G/DL (ref 12–16)
LYMPHOCYTES ABSOLUTE: 3.1 K/UL (ref 1.1–4.5)
LYMPHOCYTES RELATIVE PERCENT: 33.8 % (ref 20–40)
MCH RBC QN AUTO: 27 PG (ref 27–31)
MCHC RBC AUTO-ENTMCNC: 32.4 G/DL (ref 33–37)
MCV RBC AUTO: 83.5 FL (ref 81–99)
MONOCYTES ABSOLUTE: 0.6 K/UL (ref 0–0.9)
MONOCYTES RELATIVE PERCENT: 6.4 % (ref 0–10)
NEUTROPHILS ABSOLUTE: 5.4 K/UL (ref 1.5–7.5)
NEUTROPHILS RELATIVE PERCENT: 59.1 % (ref 50–65)
PDW BLD-RTO: 13.3 % (ref 11.5–14.5)
PLATELET # BLD: 251 K/UL (ref 130–400)
PMV BLD AUTO: 10.7 FL (ref 9.4–12.3)
POTASSIUM SERPL-SCNC: 3.8 MMOL/L (ref 3.5–5)
RBC # BLD: 4.66 M/UL (ref 4.2–5.4)
SODIUM BLD-SCNC: 139 MMOL/L (ref 136–145)
TOTAL PROTEIN: 7.6 G/DL (ref 6.6–8.7)
TSH SERPL DL<=0.05 MIU/L-ACNC: 3.34 UIU/ML (ref 0.27–4.2)
URIC ACID, SERUM: 4.2 MG/DL (ref 2.4–5.7)
VITAMIN D 25-HYDROXY: 36.4 NG/ML
WBC # BLD: 9.1 K/UL (ref 4.8–10.8)

## 2019-01-29 PROCEDURE — 99214 OFFICE O/P EST MOD 30 MIN: CPT | Performed by: NURSE PRACTITIONER

## 2019-01-29 PROCEDURE — G8482 FLU IMMUNIZE ORDER/ADMIN: HCPCS | Performed by: NURSE PRACTITIONER

## 2019-01-29 PROCEDURE — G8427 DOCREV CUR MEDS BY ELIG CLIN: HCPCS | Performed by: NURSE PRACTITIONER

## 2019-01-29 PROCEDURE — 4004F PT TOBACCO SCREEN RCVD TLK: CPT | Performed by: NURSE PRACTITIONER

## 2019-01-29 PROCEDURE — 3017F COLORECTAL CA SCREEN DOC REV: CPT | Performed by: NURSE PRACTITIONER

## 2019-01-29 PROCEDURE — G8417 CALC BMI ABV UP PARAM F/U: HCPCS | Performed by: NURSE PRACTITIONER

## 2019-01-29 RX ORDER — CEFDINIR 300 MG/1
300 CAPSULE ORAL 2 TIMES DAILY
Qty: 14 CAPSULE | Refills: 0 | Status: SHIPPED | OUTPATIENT
Start: 2019-01-29 | End: 2019-02-05

## 2019-01-29 ASSESSMENT — ENCOUNTER SYMPTOMS
STRIDOR: 0
NAUSEA: 0
COUGH: 1
CHOKING: 0
SHORTNESS OF BREATH: 0
DIARRHEA: 0
VOMITING: 0
COLOR CHANGE: 0
ABDOMINAL DISTENTION: 0
BLOOD IN STOOL: 0
ABDOMINAL PAIN: 0
EYE DISCHARGE: 0
EYE ITCHING: 0
BACK PAIN: 1
TROUBLE SWALLOWING: 0
CONSTIPATION: 0
WHEEZING: 1
SORE THROAT: 0

## 2019-01-31 ENCOUNTER — CARE COORDINATION (OUTPATIENT)
Dept: CARE COORDINATION | Age: 59
End: 2019-01-31

## 2019-01-31 DIAGNOSIS — Z00.00 HEALTH CARE MAINTENANCE: ICD-10-CM

## 2019-01-31 DIAGNOSIS — G89.4 CHRONIC PAIN SYNDROME: ICD-10-CM

## 2019-01-31 RX ORDER — SIMVASTATIN 40 MG
TABLET ORAL
Qty: 90 TABLET | Refills: 3 | Status: SHIPPED | OUTPATIENT
Start: 2019-01-31 | End: 2020-03-11 | Stop reason: SDUPTHER

## 2019-02-04 DIAGNOSIS — Z00.00 HEALTH CARE MAINTENANCE: ICD-10-CM

## 2019-02-04 DIAGNOSIS — F41.9 ANXIETY: ICD-10-CM

## 2019-02-04 DIAGNOSIS — G89.4 CHRONIC PAIN SYNDROME: ICD-10-CM

## 2019-02-04 RX ORDER — HYDROCODONE BITARTRATE AND ACETAMINOPHEN 10; 325 MG/1; MG/1
TABLET ORAL
Qty: 120 TABLET | Refills: 0 | Status: SHIPPED | OUTPATIENT
Start: 2019-02-04 | End: 2019-03-01 | Stop reason: SDUPTHER

## 2019-02-04 RX ORDER — GABAPENTIN 300 MG/1
300 CAPSULE ORAL 3 TIMES DAILY
Qty: 90 CAPSULE | Refills: 0 | Status: SHIPPED | OUTPATIENT
Start: 2019-02-04 | End: 2019-03-01 | Stop reason: SDUPTHER

## 2019-02-04 RX ORDER — LORAZEPAM 0.5 MG/1
0.25 TABLET ORAL 2 TIMES DAILY PRN
Qty: 30 TABLET | Refills: 0 | Status: SHIPPED | OUTPATIENT
Start: 2019-02-04 | End: 2019-03-01 | Stop reason: SDUPTHER

## 2019-02-07 ENCOUNTER — CARE COORDINATION (OUTPATIENT)
Dept: CARE COORDINATION | Age: 59
End: 2019-02-07

## 2019-02-11 ENCOUNTER — CARE COORDINATION (OUTPATIENT)
Dept: CARE COORDINATION | Age: 59
End: 2019-02-11

## 2019-02-18 ENCOUNTER — CARE COORDINATION (OUTPATIENT)
Dept: CARE COORDINATION | Age: 59
End: 2019-02-18

## 2019-02-19 DIAGNOSIS — E78.2 MIXED HYPERLIPIDEMIA: ICD-10-CM

## 2019-02-19 DIAGNOSIS — Z00.00 HEALTH CARE MAINTENANCE: ICD-10-CM

## 2019-02-19 RX ORDER — CHLORAL HYDRATE 500 MG
CAPSULE ORAL
Qty: 90 CAPSULE | Refills: 2 | Status: SHIPPED | OUTPATIENT
Start: 2019-02-19 | End: 2019-04-01 | Stop reason: SDUPTHER

## 2019-02-25 ENCOUNTER — CARE COORDINATION (OUTPATIENT)
Dept: CARE COORDINATION | Age: 59
End: 2019-02-25

## 2019-03-01 DIAGNOSIS — F41.9 ANXIETY: ICD-10-CM

## 2019-03-01 DIAGNOSIS — G89.4 CHRONIC PAIN SYNDROME: ICD-10-CM

## 2019-03-04 RX ORDER — LORAZEPAM 0.5 MG/1
0.25 TABLET ORAL 2 TIMES DAILY PRN
Qty: 30 TABLET | Refills: 0 | Status: SHIPPED | OUTPATIENT
Start: 2019-03-04 | End: 2019-04-01 | Stop reason: SDUPTHER

## 2019-03-04 RX ORDER — HYDROCODONE BITARTRATE AND ACETAMINOPHEN 10; 325 MG/1; MG/1
1 TABLET ORAL EVERY 6 HOURS PRN
Qty: 120 TABLET | Refills: 0 | Status: SHIPPED | OUTPATIENT
Start: 2019-03-04 | End: 2019-04-01 | Stop reason: SDUPTHER

## 2019-03-04 RX ORDER — GABAPENTIN 300 MG/1
300 CAPSULE ORAL 3 TIMES DAILY
Qty: 90 CAPSULE | Refills: 0 | Status: SHIPPED | OUTPATIENT
Start: 2019-03-04 | End: 2019-04-01 | Stop reason: SDUPTHER

## 2019-03-19 RX ORDER — IBUPROFEN 600 MG/1
TABLET ORAL
Qty: 120 TABLET | Refills: 1 | Status: SHIPPED | OUTPATIENT
Start: 2019-03-19 | End: 2019-04-01 | Stop reason: SDUPTHER

## 2019-03-21 ENCOUNTER — CARE COORDINATION (OUTPATIENT)
Dept: CARE COORDINATION | Age: 59
End: 2019-03-21

## 2019-04-01 DIAGNOSIS — E78.2 MIXED HYPERLIPIDEMIA: ICD-10-CM

## 2019-04-01 DIAGNOSIS — F41.9 ANXIETY: ICD-10-CM

## 2019-04-01 DIAGNOSIS — Z00.00 HEALTH CARE MAINTENANCE: ICD-10-CM

## 2019-04-01 DIAGNOSIS — G89.4 CHRONIC PAIN SYNDROME: ICD-10-CM

## 2019-04-02 RX ORDER — OMEPRAZOLE 40 MG/1
40 CAPSULE, DELAYED RELEASE ORAL DAILY
Qty: 90 CAPSULE | Refills: 3 | Status: SHIPPED | OUTPATIENT
Start: 2019-04-02 | End: 2019-10-25 | Stop reason: SDUPTHER

## 2019-04-02 RX ORDER — HYDROCODONE BITARTRATE AND ACETAMINOPHEN 10; 325 MG/1; MG/1
1 TABLET ORAL EVERY 6 HOURS PRN
Qty: 120 TABLET | Refills: 0 | Status: SHIPPED | OUTPATIENT
Start: 2019-04-02 | End: 2019-05-07 | Stop reason: SDUPTHER

## 2019-04-02 RX ORDER — METOPROLOL SUCCINATE 25 MG/1
25 TABLET, EXTENDED RELEASE ORAL DAILY
Qty: 90 TABLET | Refills: 3 | Status: SHIPPED | OUTPATIENT
Start: 2019-04-02 | End: 2019-05-07 | Stop reason: SDUPTHER

## 2019-04-02 RX ORDER — IBUPROFEN 600 MG/1
600 TABLET ORAL EVERY 8 HOURS PRN
Qty: 120 TABLET | Refills: 1 | Status: SHIPPED | OUTPATIENT
Start: 2019-04-02 | End: 2019-07-02 | Stop reason: ALTCHOICE

## 2019-04-02 RX ORDER — VENLAFAXINE HYDROCHLORIDE 150 MG/1
150 CAPSULE, EXTENDED RELEASE ORAL DAILY
Qty: 90 CAPSULE | Refills: 3 | Status: SHIPPED | OUTPATIENT
Start: 2019-04-02 | End: 2019-07-02

## 2019-04-02 RX ORDER — GLIPIZIDE 5 MG/1
5 TABLET ORAL
Qty: 180 TABLET | Refills: 3 | Status: SHIPPED | OUTPATIENT
Start: 2019-04-02 | End: 2019-10-25 | Stop reason: SDUPTHER

## 2019-04-02 RX ORDER — CHLORAL HYDRATE 500 MG
1000 CAPSULE ORAL 2 TIMES DAILY
Qty: 180 CAPSULE | Refills: 2 | Status: SHIPPED | OUTPATIENT
Start: 2019-04-02 | End: 2020-06-18 | Stop reason: SDUPTHER

## 2019-04-02 RX ORDER — GABAPENTIN 300 MG/1
300 CAPSULE ORAL 3 TIMES DAILY
Qty: 90 CAPSULE | Refills: 0 | Status: SHIPPED | OUTPATIENT
Start: 2019-04-02 | End: 2019-07-02 | Stop reason: ALTCHOICE

## 2019-04-02 RX ORDER — LORAZEPAM 0.5 MG/1
0.25 TABLET ORAL 2 TIMES DAILY PRN
Qty: 30 TABLET | Refills: 0 | Status: SHIPPED | OUTPATIENT
Start: 2019-04-02 | End: 2019-05-02

## 2019-04-15 ENCOUNTER — PATIENT MESSAGE (OUTPATIENT)
Dept: OTHER | Facility: CLINIC | Age: 59
End: 2019-04-15

## 2019-05-07 ENCOUNTER — CARE COORDINATION (OUTPATIENT)
Dept: CARE COORDINATION | Age: 59
End: 2019-05-07

## 2019-05-07 ENCOUNTER — OFFICE VISIT (OUTPATIENT)
Dept: INTERNAL MEDICINE | Age: 59
End: 2019-05-07
Payer: MEDICARE

## 2019-05-07 VITALS
HEIGHT: 65 IN | DIASTOLIC BLOOD PRESSURE: 70 MMHG | HEART RATE: 76 BPM | BODY MASS INDEX: 30.82 KG/M2 | SYSTOLIC BLOOD PRESSURE: 130 MMHG | WEIGHT: 185 LBS | RESPIRATION RATE: 16 BRPM

## 2019-05-07 DIAGNOSIS — Z00.00 HEALTH CARE MAINTENANCE: ICD-10-CM

## 2019-05-07 DIAGNOSIS — S62.92XD CLOSED FRACTURE OF LEFT HAND WITH ROUTINE HEALING, SUBSEQUENT ENCOUNTER: ICD-10-CM

## 2019-05-07 DIAGNOSIS — G60.9 IDIOPATHIC PERIPHERAL NEUROPATHY: ICD-10-CM

## 2019-05-07 DIAGNOSIS — I10 ESSENTIAL HYPERTENSION: ICD-10-CM

## 2019-05-07 DIAGNOSIS — G89.29 CHRONIC BILATERAL LOW BACK PAIN WITH SCIATICA, SCIATICA LATERALITY UNSPECIFIED: Chronic | ICD-10-CM

## 2019-05-07 DIAGNOSIS — Z79.4 TYPE 2 DIABETES MELLITUS WITHOUT COMPLICATION, WITH LONG-TERM CURRENT USE OF INSULIN (HCC): ICD-10-CM

## 2019-05-07 DIAGNOSIS — E11.9 TYPE 2 DIABETES MELLITUS WITHOUT COMPLICATION, WITH LONG-TERM CURRENT USE OF INSULIN (HCC): ICD-10-CM

## 2019-05-07 DIAGNOSIS — G89.4 CHRONIC PAIN SYNDROME: ICD-10-CM

## 2019-05-07 DIAGNOSIS — Z00.00 ROUTINE GENERAL MEDICAL EXAMINATION AT A HEALTH CARE FACILITY: ICD-10-CM

## 2019-05-07 DIAGNOSIS — F41.9 ANXIETY: Primary | ICD-10-CM

## 2019-05-07 DIAGNOSIS — M54.40 CHRONIC BILATERAL LOW BACK PAIN WITH SCIATICA, SCIATICA LATERALITY UNSPECIFIED: Chronic | ICD-10-CM

## 2019-05-07 LAB
AMPHETAMINE SCREEN, URINE: POSITIVE
BARBITURATE SCREEN, URINE: POSITIVE
BENZODIAZEPINE SCREEN, URINE: POSITIVE
BUPRENORPHINE URINE: NEGATIVE
COCAINE METABOLITE SCREEN URINE: NEGATIVE
GABAPENTIN SCREEN, URINE: NORMAL
MDMA URINE: POSITIVE
METHADONE SCREEN, URINE: NEGATIVE
METHAMPHETAMINE, URINE: POSITIVE
OPIATE SCREEN URINE: POSITIVE
OXYCODONE SCREEN URINE: POSITIVE
PHENCYCLIDINE SCREEN URINE: NEGATIVE
PROPOXYPHENE SCREEN, URINE: NEGATIVE
THC SCREEN, URINE: NEGATIVE
TRICYCLIC ANTIDEPRESSANTS, UR: NEGATIVE

## 2019-05-07 PROCEDURE — 2022F DILAT RTA XM EVC RTNOPTHY: CPT | Performed by: NURSE PRACTITIONER

## 2019-05-07 PROCEDURE — 99214 OFFICE O/P EST MOD 30 MIN: CPT | Performed by: NURSE PRACTITIONER

## 2019-05-07 PROCEDURE — G8417 CALC BMI ABV UP PARAM F/U: HCPCS | Performed by: NURSE PRACTITIONER

## 2019-05-07 PROCEDURE — 3017F COLORECTAL CA SCREEN DOC REV: CPT | Performed by: NURSE PRACTITIONER

## 2019-05-07 PROCEDURE — 3046F HEMOGLOBIN A1C LEVEL >9.0%: CPT | Performed by: NURSE PRACTITIONER

## 2019-05-07 PROCEDURE — 4004F PT TOBACCO SCREEN RCVD TLK: CPT | Performed by: NURSE PRACTITIONER

## 2019-05-07 PROCEDURE — 80305 DRUG TEST PRSMV DIR OPT OBS: CPT | Performed by: NURSE PRACTITIONER

## 2019-05-07 PROCEDURE — G0438 PPPS, INITIAL VISIT: HCPCS | Performed by: NURSE PRACTITIONER

## 2019-05-07 PROCEDURE — G8427 DOCREV CUR MEDS BY ELIG CLIN: HCPCS | Performed by: NURSE PRACTITIONER

## 2019-05-07 RX ORDER — GABAPENTIN 300 MG/1
300 CAPSULE ORAL 3 TIMES DAILY
Qty: 90 CAPSULE | Refills: 0 | Status: CANCELLED | OUTPATIENT
Start: 2019-05-07 | End: 2019-06-06

## 2019-05-07 RX ORDER — HYDROCODONE BITARTRATE AND ACETAMINOPHEN 10; 325 MG/1; MG/1
1 TABLET ORAL EVERY 6 HOURS PRN
Qty: 120 TABLET | Refills: 0 | Status: CANCELLED | OUTPATIENT
Start: 2019-05-07 | End: 2019-06-04

## 2019-05-07 RX ORDER — LORAZEPAM 0.5 MG/1
0.5 TABLET ORAL DAILY
Qty: 30 TABLET | Refills: 0 | Status: SHIPPED | OUTPATIENT
Start: 2019-05-07 | End: 2019-06-06

## 2019-05-07 RX ORDER — HYDROCODONE BITARTRATE AND ACETAMINOPHEN 10; 325 MG/1; MG/1
1 TABLET ORAL 2 TIMES DAILY PRN
Qty: 60 TABLET | Refills: 0 | Status: SHIPPED | OUTPATIENT
Start: 2019-05-07 | End: 2019-07-02 | Stop reason: ALTCHOICE

## 2019-05-07 RX ORDER — METOPROLOL SUCCINATE 25 MG/1
25 TABLET, EXTENDED RELEASE ORAL DAILY
Qty: 90 TABLET | Refills: 3 | Status: SHIPPED | OUTPATIENT
Start: 2019-05-07 | End: 2019-10-25 | Stop reason: SDUPTHER

## 2019-05-07 ASSESSMENT — ENCOUNTER SYMPTOMS
SHORTNESS OF BREATH: 0
TROUBLE SWALLOWING: 0
BLOOD IN STOOL: 0
SORE THROAT: 0
ABDOMINAL DISTENTION: 0
EYE ITCHING: 0
DIARRHEA: 0
EYE DISCHARGE: 0
CONSTIPATION: 0
VOMITING: 0
ABDOMINAL PAIN: 0
COLOR CHANGE: 0
CHOKING: 0
NAUSEA: 0
COUGH: 0
STRIDOR: 0
WHEEZING: 0

## 2019-05-07 ASSESSMENT — PATIENT HEALTH QUESTIONNAIRE - PHQ9: SUM OF ALL RESPONSES TO PHQ QUESTIONS 1-9: 14

## 2019-05-07 NOTE — PATIENT INSTRUCTIONS
#1 left hand fracture stable she's under the care of  in [place]   2. type II diabetes mellitus we had just  Prior authorized r Lantus and she has a prescription for this but she has not been taking it  #3 peripheral neuropathy she has not been taking the gabapentin so therefore we will not need to do a taper dose of that  #4 hypertension stable on current medications  #5 sinus drainage she is currently on antibiotics for this now  #6 chronic pain syndrome I have given her a one-month supply of Lortab twice daily for a month and advised her she will need to find another primary care provider as her drug screen showed methamphetamine and oxycodone. She did denies the use of these. Did explain to her we can send the drug screen off if she wants to but she will be responsible for paying for it. She did not want to do that  #7 anxiety same for this due to the results of her drug screen I have given her 1 Ativan per day for 30 days   I have notified our  to write her R discharge from the entire office;   have been trying to work with her to get her to a clinic closer to her E    Refill or [facility] she's not wanted to do that because she was afraid they would not give her pain medication with Send her to pain management in the past with the same things that she always declined. The way that she cancels and rescheduled appointments here she would probably not be a good pain management  candidate anyway   Personalized Preventive Plan for Chrsi Mohamud - 5/7/2019  Medicare offers a range of preventive health benefits. Some of the tests and screenings are paid in full while other may be subject to a deductible, co-insurance, and/or copay. Some of these benefits include a comprehensive review of your medical history including lifestyle, illnesses that may run in your family, and various assessments and screenings as appropriate.     After reviewing your medical record and screening and assessments performed today your provider may have ordered immunizations, labs, imaging, and/or referrals for you. A list of these orders (if applicable) as well as your Preventive Care list are included within your After Visit Summary for your review. Other Preventive Recommendations:    · A preventive eye exam performed by an eye specialist is recommended every 1-2 years to screen for glaucoma; cataracts, macular degeneration, and other eye disorders. · A preventive dental visit is recommended every 6 months. · Try to get at least 150 minutes of exercise per week or 10,000 steps per day on a pedometer . · Order or download the FREE \"Exercise & Physical Activity: Your Everyday Guide\" from [employer]. Call 2-327.413.5858 or search [employer] online. · You need 3170-0327 mg of calcium and 6845-3114 IU of vitamin D per day. It is possible to meet your calcium requirement with diet alone, but a vitamin D supplement is usually necessary to meet this goal.  · When exposed to the sun, use a sunscreen that protects against both UVA and UVB radiation with an SPF of 30 or greater. Reapply every 2 to 3 hours or after sweating, drying off with a towel, or swimming. · Always wear a seat belt when traveling in a car. Always wear a helmet when riding a bicycle or motorcycle.

## 2019-05-07 NOTE — PROGRESS NOTES
[facility] INTERNAL MEDICINE  [facility] [facility]  [address] [place] [address]  Dept: [phone]  Dept Fax: [phone]  Loc: [phone]    Yaima Enriquez is a 66 y.o. female who presents today for her medical conditions/complaints as noted below. Yaima Enriquez is c/ni Medicare AWV (Patient is here for wellness visit. ); Annual Exam (Patient is here for yearly physical. Patient refused pap smear today. Patient has not had labs done for visit.); and Diabetes (Patient has not been taking Lantus 30 units nightly. Patient was given Humalog sample at recent hospital stay and she has been taking that 4 units BID.)        HPI:     HPI   1. Fracture left hand; She has had surgery on her left hand;  At [facility] (formerly [facility])   2. TYPE II DM; Her sugars have been 180-297; She really doesn't stay on a diabetic diet   3. Peripheral neuropathy; She does not like taking gabapentin ; But has continuous flares of her feet and legs hurting  4. HTN:  Stable on current meds; No side effects of the meds; Takes as directed; takes blood pressure 3-4 times a week     . 5. Sinus drainage; On keflex currently  It is stable for now  6. Chronic pain syndrome; Has been on lortab up to 4 per day; she has been out since last Wednesday; She has cancelled her last appt and just came in today ; She has not had her meds since last Wednesday; She took some of her sisters percocet when she ran out;    7. Anxiety; She has been out of the ativan for the last 5 days as well;      Chief Complaint   Patient presents with   [facility]     Patient is here for wellness visit.  Annual Exam     Patient is here for yearly physical. Patient refused pap smear today. Patient has not had labs done for visit.  Diabetes     Patient has not been taking Lantus 30 units nightly. Patient was given Humalog sample at recent hospital stay and she has been taking that 4 units BID.        Past Medical History:   Diagnosis Date    Anxiety     Arthritis     Bilateral low back pain with sciatica 12/21/2015    Bladder incontinence     CAD (coronary artery disease)     CAD (coronary artery disease)     Cervical cancer (HCC)     H/O    COPD (chronic obstructive pulmonary disease) (HCC)     Depression     Diabetes mellitus (HCC)     GERD (gastroesophageal reflux disease)     History of blood transfusion     Hyperlipidemia     Hypertension     Hypoxia       Past Surgical History:   Procedure Laterality Date   [place] CARDIAC SURGERY      october 8th 2009    CHOLECYSTECTOMY, LAPAROSCOPIC      CORONARY ARTERY BYPASS GRAFT      8/8 triple       Vitals 5/7/2019 1/29/2019 10/5/2018 6/8/2018 3/12/2018 07/44/7228   SYSTOLIC 548 053 208 [phone]   DIASTOLIC 70 86 82 70 84 82   Pulse 76 72 85 80 84 70   Temp - - - - - 97.1   Resp 16 16 16 - 16 16   Weight 185 lb 189 lb 187 lb 182 lb 8 oz 178 lb 182 lb   Height 5' 5\" 5' 5\" 5' 5\" 5' 5\" 5' 5.5\" 5' 5.5\"   BMI (wt*703/ht~2) 30.78 kg/m2 31.45 kg/m2 31.12 kg/m2 30.37 kg/m2 29.17 kg/m2 29.82 kg/m2   Pain Level - - - - - -   Some recent data might be hidden       Family History   Problem Relation Age of Onset    Cancer Mother     Kidney Disease Father         renal failure       Social History     Tobacco Use    Smoking status: Current Every Day Smoker     Packs/day: 1.00     Years: 15.00     Pack years: 15.00     Types: Cigarettes    Smokeless tobacco: Never Used   Substance Use Topics    Alcohol use: No      Current Outpatient Medications   Medication Sig Dispense Refill    metoprolol succinate (TOPROL XL) 25 MG extended release tablet Take 1 tablet by mouth daily 90 tablet 3    HYDROcodone-acetaminophen (NORCO)  MG per tablet Take 1 tablet by mouth 2 times daily as needed for Pain for up to 30 days. 60 tablet 0    LORazepam (ATIVAN) 0.5 MG tablet Take 1 tablet by mouth daily for 30 days.  30 tablet 0    omeprazole (PRILOSEC) 40 MG delayed release capsule Take 1 capsule by mouth daily 90 capsule 3    glipiZIDE (GLUCOTROL) 5 MG tablet Take 1 tablet by mouth 2 times daily (before meals) 180 tablet 3    venlafaxine (EFFEXOR XR) 150 MG extended release capsule Take 1 capsule by mouth daily 90 capsule 3    blood glucose test strips (BECK CONTOUR NEXT TEST) strip 1 each by In Vitro route daily As needed. 100 each 3    silver sulfADIAZINE (SILVADENE) 1 % cream Apply topically daily. 50 g 3    Omega-3 Fatty Acids (FISH OIL) 1000 MG CAPS Take 1 capsule by mouth 2 times daily 180 capsule 2    insulin glargine (LANTUS SOLOSTAR) 100 UNIT/ML injection pen Inject 30 Units into the skin nightly 5 pen 3    Insulin Pen Needle 32G X 4 MM MISC 1 each by Does not apply route daily 100 each 3    gabapentin (NEURONTIN) 300 MG capsule Take 1 capsule by mouth 3 times daily for 30 days. 90 capsule 0    ibuprofen (ADVIL;MOTRIN) 600 MG tablet Take 1 tablet by mouth every 8 hours as needed for Pain 120 tablet 1    VENTOLIN  (90 Base) MCG/ACT inhaler Inhale 1 puff into the lungs 4 times daily 3 Inhaler 1    simvastatin (ZOCOR) 40 MG tablet TAKE ONE TABLET BY MOUTH EVERY DAY 90 tablet 3    lisinopril (PRINIVIL;ZESTRIL) 20 MG tablet TAKE ONE TABLET BY MOUTH EVERY DAY 30 tablet 3    vitamin D (ERGOCALCIFEROL) 31555 units CAPS capsule TAKE ONE CAPSULE BY MOUTH TWICE WEEKLY ON TUESDAY AND FRIDAY 8 capsule 3    cloNIDine (CATAPRES) 0.1 MG tablet TAKE ONE TABLET BY MOUTH TWICE DAILY 180 tablet 3    nystatin (MYCOSTATIN) 305866 UNIT/ML suspension Take 5 mLs by mouth 4 times daily 1 Bottle 2    nitroGLYCERIN (NITROSTAT) 0.4 MG SL tablet Place 1 tablet under the tongue every 5 minutes as needed for Chest pain up to max of 3 total doses. If no relief after 1 dose, call 911. 25 tablet 1    furosemide (LASIX) 40 MG tablet Take 1 tablet by mouth daily 30 tablet 3    OXYGEN Inhale into the lungs every 8 hours       No current facility-administered medications for this visit. Allergies   Allergen Reactions    Levaquin [Levofloxacin In D5w] Anaphylaxis    Noroxin [Norfloxacin] Shortness Of Breath    Prozac [Fluoxetine Hcl]        Health Maintenance   Topic Date Due    Diabetic retinal exam  07/21/1972    Cervical cancer screen  07/21/1983    Colon cancer screen colonoscopy  07/21/2012    A1C test (Diabetic or Prediabetic)  04/29/2019    Hepatitis B Vaccine (1 of 3 - Risk 3-dose series) 04/30/2020 (Originally 7/21/1981)    Shingles Vaccine (1 of 2) 04/30/2020 (Originally 7/21/2012)    Lipid screen  06/08/2019    Breast cancer screen  09/22/2019    Diabetic foot exam  10/05/2019    Diabetic microalbuminuria test  10/05/2019    Potassium monitoring  01/29/2020    Creatinine monitoring  01/29/2020    DTaP/Tdap/Td vaccine (2 - Td) 06/08/2028    Flu vaccine  Completed    Pneumococcal 0-64 years Vaccine  Completed    Hepatitis C screen  Completed    HIV screen  Completed       Lab Results   Component Value Date    LABA1C 9.2 (H) 01/29/2019     No results found for: PSA, PSADIA  TSH   Date Value Ref Range Status   01/29/2019 3.340 0.270 - 4.200 uIU/mL Final   ]  Lab Results   Component Value Date     01/29/2019    K 3.8 01/29/2019    CL 96 (L) 01/29/2019    CO2 27 01/29/2019    BUN 12 01/29/2019    CREATININE 0.7 01/29/2019    GLUCOSE 357 (H) 01/29/2019    CALCIUM 9.3 01/29/2019    PROT 7.6 01/29/2019    LABALBU 4.1 01/29/2019    BILITOT 0.3 01/29/2019    ALKPHOS 76 01/29/2019    AST 13 01/29/2019    ALT 12 01/29/2019    LABGLOM >60 01/29/2019     Lab Results   Component Value Date    CHOL 219 (H) 06/08/2018     Lab Results   Component Value Date    TRIG 208 (H) 06/08/2018     Lab Results   Component Value Date    HDL 52 (L) 06/08/2018     Lab Results   Component Value Date    LDLCALC 125 06/08/2018     Lab Results   Component Value Date     01/29/2019    K 3.8 01/29/2019    CL 96 01/29/2019    CO2 27 01/29/2019    BUN 12 01/29/2019    CREATININE 0.7 01/29/2019 Normal range of motion. Neck supple. No JVD present. No thyromegaly present. Cardiovascular: Normal rate, regular rhythm and normal heart sounds. No murmur heard. Pulmonary/Chest: Effort normal and breath sounds normal. No respiratory distress. She has no wheezes. She has no rales. Abdominal: Soft. Bowel sounds are normal. She exhibits no distension and no mass. There is no tenderness. There is no rebound and no guarding. Musculoskeletal: Normal range of motion. She exhibits no edema or tenderness. Left hand in a splint    Neurological: She is alert and oriented to person, place, and time. She has normal reflexes. She displays normal reflexes. No cranial nerve deficit. Coordination normal.   Skin: Skin is warm and dry. No rash noted. No erythema. Psychiatric: Her behavior is normal. Judgment and thought content normal. She does not exhibit a depressed mood. /70   Pulse 76   Resp 16   Ht 5' 5\" (1.651 m)   Wt 185 lb (83.9 kg)   BMI 30.79 kg/m²     Assessment:       Diagnosis Orders   1. Anxiety  LORazepam (ATIVAN) 0.5 MG tablet   2. Chronic pain syndrome  HYDROcodone-acetaminophen (NORCO)  MG per tablet    LORazepam (ATIVAN) 0.5 MG tablet   3. Health care maintenance  metoprolol succinate (TOPROL XL) 25 MG extended release tablet   4. Closed fracture of left hand with routine healing, subsequent encounter     5. Type 2 diabetes mellitus without complication, with long-term current use of insulin ([address]     6. Essential hypertension     7. Chronic bilateral low back pain with sciatica, sciatica laterality unspecified     8. Idiopathic peripheral neuropathy         Plan:        Patient given educational materials - see patient instructions. Discussed use, benefit, and side effects of prescribed medications. Allpatient questions answered. Pt voiced understanding. Reviewed health maintenance. Instructed to continue current medications, diet and exercise.   Patient agreed with treatment plan. Follow up as directed. MEDICATIONS:  Orders Placed This Encounter   Medications    metoprolol succinate (TOPROL XL) 25 MG extended release tablet     Sig: Take 1 tablet by mouth daily     Dispense:  90 tablet     Refill:  3     This prescription was filled on 12/27/2018. Any refills authorized will be placed on file.  HYDROcodone-acetaminophen (NORCO)  MG per tablet     Sig: Take 1 tablet by mouth 2 times daily as needed for Pain for up to 30 days. Dispense:  60 tablet     Refill:  0     This is last script from [facility] internal medicine    LORazepam (ATIVAN) 0.5 MG tablet     Sig: Take 1 tablet by mouth daily for 30 days. Dispense:  30 tablet     Refill:  0     Quality & Risk Score Accuracy    Visit Dx:  E11.9, Z79.4 - Type 2 diabetes mellitus without complication, with long-term current use of insulin ([facility])  Assessment and plan: The condition is worsening as she does not take her meds or even try to stay on a diet   Last edited 05/07/19 16:26 CDT by MAGDALENA Cai         ORDERS:  No orders of the defined types were placed in this encounter. Follow-up:  No follow-ups on file. PATIENT INSTRUCTIONS:  Patient Instructions   #1 left hand fracture stable she's under the care of  in [place]   2. type II diabetes mellitus we had just  Prior authorized mary Lantus and she has a prescription for this but she has not been taking it  #3 peripheral neuropathy she has not been taking the gabapentin so therefore we will not need to do a taper dose of that  #4 hypertension stable on current medications  #5 sinus drainage she is currently on antibiotics for this now  #6 chronic pain syndrome I have given her a one-month supply of Lortab twice daily for a month and advised her she will need to find another primary care provider as her drug screen showed methamphetamine and oxycodone. She did denies the use of these.     Did explain to her we can send the drug screen off if she wants to but she will be responsible for paying for it. She did not want to do that  #7 anxiety same for this due to the results of her drug screen I have given her 1 Ativan per day for 30 days   I have notified our  to write her R discharge from the entire office;   have been trying to work with her to get her to a clinic closer to her E    Refill or [place] she's not wanted to do that because she was afraid they would not give her pain medication with Send her to pain management in the past with the same things that she always declined. The way that she cancels and rescheduled appointments here she would probably not be a good pain management  candidate anyway     Electronically signed by MAGDALENA Lombardi on 2019 at 4:33 PM    EMRDragon/transcription disclaimer:  Much of this encounter note is electronic transcription/translation of spoken language to printed texts. The electronic translation of spoken language may be erroneous, or at times,nonsensical words or phrases may be inadvertently transcribed. Although I have reviewed the note for such errors, some may still exist.  Medicare Annual Wellness Visit  Name: Tomasa Davies Date: 2019   MRN: [id number] Sex: Female   Age: 66 y.o. Ethnicity: Non-/Non    : 1960 Race: Tyson Kat is here for Medicare AWV (Patient is here for wellness visit. ); Annual Exam (Patient is here for yearly physical. Patient refused pap smear today. Patient has not had labs done for visit.); and Diabetes (Patient has not been taking Lantus 30 units nightly. Patient was given Humalog sample at recent hospital stay and she has been taking that 4 units BID.)    Screenings for behavioral, psychosocial and functional/safety risks, and cognitive dysfunction are all negative except as indicated below.  These results, as well as other patient data from the Health Risk Assessment form, are documented in Flowsheets linked to this Encounter. Allergies   Allergen Reactions    Levaquin [Levofloxacin In D5w] Anaphylaxis    Noroxin [Norfloxacin] Shortness Of Breath    Prozac [Fluoxetine Hcl]        Prior to Visit Medications    Medication Sig Taking? Authorizing Provider   metoprolol succinate (TOPROL XL) 25 MG extended release tablet Take 1 tablet by mouth daily Yes MAGDALENA Jolley   HYDROcodone-acetaminophen (NORCO)  MG per tablet Take 1 tablet by mouth 2 times daily as needed for Pain for up to 30 days. Yes MAGDALENA Jolley   LORazepam (ATIVAN) 0.5 MG tablet Take 1 tablet by mouth daily for 30 days. Yes MAGDALENA Jolley   omeprazole (PRILOSEC) 40 MG delayed release capsule Take 1 capsule by mouth daily Yes MAGDALENA Jolley   glipiZIDE (GLUCOTROL) 5 MG tablet Take 1 tablet by mouth 2 times daily (before meals) Yes MAGDALENA Jolley   venlafaxine (EFFEXOR XR) 150 MG extended release capsule Take 1 capsule by mouth daily Yes MAGDALENA Jolley   blood glucose test strips (BECK CONTOUR NEXT TEST) strip 1 each by In Vitro route daily As needed. Yes MAGDALENA Jolley   silver sulfADIAZINE (SILVADENE) 1 % cream Apply topically daily. Yes MAGDALENA Jolley   Omega-3 Fatty Acids (FISH OIL) 1000 MG CAPS Take 1 capsule by mouth 2 times daily Yes MAGDALENA Jolley   insulin glargine (LANTUS SOLOSTAR) 100 UNIT/ML injection pen Inject 30 Units into the skin nightly Yes MAGDALENA Jolley   Insulin Pen Needle 32G X 4 MM MISC 1 each by Does not apply route daily Yes MAGDALENA Jolley   gabapentin (NEURONTIN) 300 MG capsule Take 1 capsule by mouth 3 times daily for 30 days.  Yes MAGDALENA Jolley   ibuprofen (ADVIL;MOTRIN) 600 MG tablet Take 1 tablet by mouth every 8 hours as needed for Pain Yes MAGDALENA Jolley   VENTOLIN  (90 Base) MCG/ACT inhaler Inhale 1 puff into the lungs 4 times daily Yes MAGDALENA Jolley   simvastatin (ZOCOR) 40 MG tablet TAKE ONE TABLET BY MOUTH EVERY DAY times per week?: (!) (P) No  Have you lost any weight without trying in the past 3 months?: (P) No  Do you eat fewer than 2 meals per day?: (P) No  Have you seen a dentist within the past year?: (P) Yes  Body mass index is 30.79 kg/m². Health Habits/Nutrition Interventions:  · Inadequate physical activity:  patient is not ready to increase his/her physical activity level at this time    Hearing/Vision:  Hearing/Vision  Do you or your family notice any trouble with your hearing?: (!) (P) Yes  Do you have difficulty driving, watching TV, or doing any of your daily activities because of your eyesight?: (P) No  Have you had an eye exam within the past year?: (!) (P) No  Hearing/Vision Interventions:  · Hearing concerns:  patient declines any further evaluation/treatment for hearing issues    Safety:  Safety  Do you have working smoke detectors?: (P) Yes  Have all throw rugs been removed or fastened?: (!) (P) No  Do you have non-slip mats in all bathtubs?: (P) Yes  Do all of your stairways have a railing or banister?: (P) Yes  Are your doorways, halls and stairs free of clutter?: (P) Yes  Do you always fasten your seatbelt when you are in a car?: (P) Yes  Safety Interventions:  · Home safety tips provided    ADL:  ADLs  In the past 7 days, did you need help from others to perform any of the following everyday activities? Eating, dressing, grooming, bathing, toileting, or walking/balance?: (!) (P) Eating, Dressing, Grooming, Bathing, Walking/Balance  In the past 7 days, did you need help from others to take care of any of the following?  Laundry, housekeeping, banking/finances, shopping, telephone use, food preparation, transportation, or taking medications?: (!) (P) Laundry, Housekeeping, Shopping, Food Preparation, Transportation, Taking Medications  ADL Interventions:  · Patient declines any further evaluation/treatment for this issue    Personalized Preventive Plan   Current Health Maintenance Status  Immunization History   Administered Date(s) Administered    Influenza, High Dose (Fluzone 65 yrs and older) 10/05/2018    Pneumococcal Polysaccharide (Hgimaekxe57) 06/08/2018    Tdap (Boostrix, Adacel) 06/08/2018        Health Maintenance   Topic Date Due    Diabetic retinal exam  07/21/1972    Cervical cancer screen  07/21/1983    Colon cancer screen colonoscopy  07/21/2012    A1C test (Diabetic or Prediabetic)  04/29/2019    Hepatitis B Vaccine (1 of 3 - Risk 3-dose series) 04/30/2020 (Originally 7/21/1981)    Shingles Vaccine (1 of 2) 04/30/2020 (Originally 7/21/2012)    Lipid screen  06/08/2019    Breast cancer screen  09/22/2019    Diabetic foot exam  10/05/2019    Diabetic microalbuminuria test  10/05/2019    Potassium monitoring  01/29/2020    Creatinine monitoring  01/29/2020    DTaP/Tdap/Td vaccine (2 - Td) 06/08/2028    Flu vaccine  Completed    Pneumococcal 0-64 years Vaccine  Completed    Hepatitis C screen  Completed    HIV screen  Completed     Recommendations for Preventive Services Due: see orders and patient instructions/AVS.  .   Recommended screening schedule for the next 5-10 years is provided to the patient in written form: see Patient Instructions/AVS.

## 2019-05-07 NOTE — CARE COORDINATION
I was going to add the pt to the [address] [place] [address] program today after her visit. 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 Her PCP, Charlee Goodell, NP, is going to discharge her from the practice. I did go and talk with the pt about the program and explained that we had other [facility] offices that had Care Coordination, such as our [place], [place], and [facility]. I gave her both my contact info and that of the [facility] for the [place] office. She is to call or MyChart message me so I can connect her with the [facility] in [place] if appropriate. Pt stated understanding. Will f/u as indicated.

## 2019-05-09 ENCOUNTER — TELEPHONE (OUTPATIENT)
Dept: INTERNAL MEDICINE | Age: 59
End: 2019-05-09

## 2019-05-09 NOTE — TELEPHONE ENCOUNTER
Please send her a letter of discharge from the practice   for failing the drug screen;   I have spoken with her at the office;  she has a 30 day supply of Lortab and lorazepam;    Explained she would need to find another practice for her medical needs;       Thanks Anel Cobb

## 2019-05-14 RX ORDER — LISINOPRIL 20 MG/1
TABLET ORAL
Qty: 30 TABLET | Refills: 3 | OUTPATIENT
Start: 2019-05-14

## 2019-06-11 DIAGNOSIS — E55.9 VITAMIN D DEFICIENCY: ICD-10-CM

## 2019-06-11 DIAGNOSIS — Z00.00 HEALTH CARE MAINTENANCE: ICD-10-CM

## 2019-06-11 RX ORDER — LISINOPRIL 20 MG/1
TABLET ORAL
Qty: 30 TABLET | Refills: 3 | OUTPATIENT
Start: 2019-06-11

## 2019-06-11 RX ORDER — ERGOCALCIFEROL 1.25 MG/1
CAPSULE ORAL
Qty: 8 CAPSULE | Refills: 3 | OUTPATIENT
Start: 2019-06-11

## 2019-06-11 RX ORDER — IBUPROFEN 600 MG/1
TABLET ORAL
Qty: 120 TABLET | Refills: 1 | OUTPATIENT
Start: 2019-06-11

## 2019-06-24 RX ORDER — LISINOPRIL 20 MG/1
TABLET ORAL
Qty: 30 TABLET | Refills: 3 | OUTPATIENT
Start: 2019-06-24

## 2019-07-01 RX ORDER — LISINOPRIL 20 MG/1
TABLET ORAL
Qty: 30 TABLET | Refills: 3 | OUTPATIENT
Start: 2019-07-01

## 2019-07-02 ENCOUNTER — OFFICE VISIT (OUTPATIENT)
Dept: FAMILY MEDICINE CLINIC | Age: 59
End: 2019-07-02
Payer: MEDICARE

## 2019-07-02 VITALS
HEIGHT: 65 IN | DIASTOLIC BLOOD PRESSURE: 84 MMHG | HEART RATE: 104 BPM | SYSTOLIC BLOOD PRESSURE: 110 MMHG | RESPIRATION RATE: 16 BRPM | WEIGHT: 188 LBS | TEMPERATURE: 96.9 F | BODY MASS INDEX: 31.32 KG/M2 | OXYGEN SATURATION: 97 %

## 2019-07-02 DIAGNOSIS — E11.42 DIABETIC POLYNEUROPATHY ASSOCIATED WITH TYPE 2 DIABETES MELLITUS (HCC): ICD-10-CM

## 2019-07-02 DIAGNOSIS — E78.2 MIXED HYPERLIPIDEMIA: ICD-10-CM

## 2019-07-02 DIAGNOSIS — I10 ESSENTIAL HYPERTENSION: ICD-10-CM

## 2019-07-02 DIAGNOSIS — Z79.4 TYPE 2 DIABETES MELLITUS WITH HYPERGLYCEMIA, WITH LONG-TERM CURRENT USE OF INSULIN (HCC): ICD-10-CM

## 2019-07-02 DIAGNOSIS — M79.672 PAIN IN BOTH FEET: ICD-10-CM

## 2019-07-02 DIAGNOSIS — F32.A ANXIETY AND DEPRESSION: ICD-10-CM

## 2019-07-02 DIAGNOSIS — F41.9 ANXIETY AND DEPRESSION: ICD-10-CM

## 2019-07-02 DIAGNOSIS — G89.29 CHRONIC RIGHT-SIDED LOW BACK PAIN WITH RIGHT-SIDED SCIATICA: ICD-10-CM

## 2019-07-02 DIAGNOSIS — Z79.4 TYPE 2 DIABETES MELLITUS WITH HYPERGLYCEMIA, WITH LONG-TERM CURRENT USE OF INSULIN (HCC): Primary | ICD-10-CM

## 2019-07-02 DIAGNOSIS — M54.41 CHRONIC RIGHT-SIDED LOW BACK PAIN WITH RIGHT-SIDED SCIATICA: ICD-10-CM

## 2019-07-02 DIAGNOSIS — E11.65 TYPE 2 DIABETES MELLITUS WITH HYPERGLYCEMIA, WITH LONG-TERM CURRENT USE OF INSULIN (HCC): Primary | ICD-10-CM

## 2019-07-02 DIAGNOSIS — R09.81 SINUS CONGESTION: ICD-10-CM

## 2019-07-02 DIAGNOSIS — E11.65 TYPE 2 DIABETES MELLITUS WITH HYPERGLYCEMIA, WITH LONG-TERM CURRENT USE OF INSULIN (HCC): ICD-10-CM

## 2019-07-02 DIAGNOSIS — M79.671 PAIN IN BOTH FEET: ICD-10-CM

## 2019-07-02 LAB
ALBUMIN SERPL-MCNC: 4.3 G/DL (ref 3.5–5.2)
ALP BLD-CCNC: 87 U/L (ref 35–104)
ALT SERPL-CCNC: 12 U/L (ref 5–33)
ANION GAP SERPL CALCULATED.3IONS-SCNC: 15 MMOL/L (ref 7–19)
AST SERPL-CCNC: 11 U/L (ref 5–32)
BASOPHILS ABSOLUTE: 0 K/UL (ref 0–0.2)
BASOPHILS RELATIVE PERCENT: 0.4 % (ref 0–1)
BILIRUB SERPL-MCNC: 0.3 MG/DL (ref 0.2–1.2)
BUN BLDV-MCNC: 10 MG/DL (ref 6–20)
CALCIUM SERPL-MCNC: 9.7 MG/DL (ref 8.6–10)
CHLORIDE BLD-SCNC: 98 MMOL/L (ref 98–111)
CHOLESTEROL, TOTAL: 238 MG/DL (ref 160–199)
CO2: 26 MMOL/L (ref 22–29)
CREAT SERPL-MCNC: 0.8 MG/DL (ref 0.5–0.9)
CREATININE URINE: 309.5 MG/DL (ref 4.2–622)
EOSINOPHILS ABSOLUTE: 0 K/UL (ref 0–0.6)
EOSINOPHILS RELATIVE PERCENT: 0.1 % (ref 0–5)
GFR NON-AFRICAN AMERICAN: >60
GLUCOSE BLD-MCNC: 206 MG/DL (ref 74–109)
HBA1C MFR BLD: 9.4 % (ref 4–6)
HCT VFR BLD CALC: 43.1 % (ref 37–47)
HDLC SERPL-MCNC: 44 MG/DL (ref 65–121)
HEMOGLOBIN: 13.5 G/DL (ref 12–16)
LDL CHOLESTEROL CALCULATED: 160 MG/DL
LYMPHOCYTES ABSOLUTE: 2.8 K/UL (ref 1.1–4.5)
LYMPHOCYTES RELATIVE PERCENT: 36.2 % (ref 20–40)
MCH RBC QN AUTO: 27.2 PG (ref 27–31)
MCHC RBC AUTO-ENTMCNC: 31.3 G/DL (ref 33–37)
MCV RBC AUTO: 86.9 FL (ref 81–99)
MICROALBUMIN UR-MCNC: 1.9 MG/DL (ref 0–19)
MICROALBUMIN/CREAT UR-RTO: 6.1 MG/G
MONOCYTES ABSOLUTE: 0.5 K/UL (ref 0–0.9)
MONOCYTES RELATIVE PERCENT: 6.7 % (ref 0–10)
NEUTROPHILS ABSOLUTE: 4.4 K/UL (ref 1.5–7.5)
NEUTROPHILS RELATIVE PERCENT: 56.3 % (ref 50–65)
PDW BLD-RTO: 13.2 % (ref 11.5–14.5)
PLATELET # BLD: 275 K/UL (ref 130–400)
PMV BLD AUTO: 11.1 FL (ref 9.4–12.3)
POTASSIUM SERPL-SCNC: 3.9 MMOL/L (ref 3.5–5)
RBC # BLD: 4.96 M/UL (ref 4.2–5.4)
SODIUM BLD-SCNC: 139 MMOL/L (ref 136–145)
TOTAL PROTEIN: 7.9 G/DL (ref 6.6–8.7)
TRIGL SERPL-MCNC: 170 MG/DL (ref 0–149)
WBC # BLD: 7.8 K/UL (ref 4.8–10.8)

## 2019-07-02 PROCEDURE — 3017F COLORECTAL CA SCREEN DOC REV: CPT | Performed by: FAMILY MEDICINE

## 2019-07-02 PROCEDURE — 4004F PT TOBACCO SCREEN RCVD TLK: CPT | Performed by: FAMILY MEDICINE

## 2019-07-02 PROCEDURE — 2022F DILAT RTA XM EVC RTNOPTHY: CPT | Performed by: FAMILY MEDICINE

## 2019-07-02 PROCEDURE — G8417 CALC BMI ABV UP PARAM F/U: HCPCS | Performed by: FAMILY MEDICINE

## 2019-07-02 PROCEDURE — G8427 DOCREV CUR MEDS BY ELIG CLIN: HCPCS | Performed by: FAMILY MEDICINE

## 2019-07-02 PROCEDURE — 99215 OFFICE O/P EST HI 40 MIN: CPT | Performed by: FAMILY MEDICINE

## 2019-07-02 PROCEDURE — 3046F HEMOGLOBIN A1C LEVEL >9.0%: CPT | Performed by: FAMILY MEDICINE

## 2019-07-02 RX ORDER — DIPHENHYDRAMINE HCL 25 MG
25 CAPSULE ORAL EVERY 6 HOURS PRN
COMMUNITY
End: 2020-09-17

## 2019-07-02 RX ORDER — LORATADINE 10 MG/1
10 TABLET ORAL DAILY
Qty: 30 TABLET | Refills: 2 | Status: SHIPPED | OUTPATIENT
Start: 2019-07-02 | End: 2020-03-11

## 2019-07-02 RX ORDER — VENLAFAXINE 75 MG/1
75 TABLET ORAL 3 TIMES DAILY
Qty: 90 TABLET | Refills: 2 | Status: SHIPPED | OUTPATIENT
Start: 2019-07-02 | End: 2019-10-25 | Stop reason: SDUPTHER

## 2019-07-02 NOTE — PROGRESS NOTES
Cervical cancer (HCC)     H/O    COPD (chronic obstructive pulmonary disease) (HCC)     Depression     Diabetes mellitus (HCC)     GERD (gastroesophageal reflux disease)     History of blood transfusion     Hyperlipidemia     Hypertension     Hypoxia        Current Outpatient Medications   Medication Sig Dispense Refill    diphenhydrAMINE (BENADRYL) 25 MG capsule Take 25 mg by mouth every 6 hours as needed      venlafaxine (EFFEXOR) 75 MG tablet Take 1 tablet by mouth 3 times daily 90 tablet 2    loratadine (CLARITIN) 10 MG tablet Take 1 tablet by mouth daily 30 tablet 2    Insulin Degludec-Liraglutide (XULTOPHY) 100-3.6 UNIT-MG/ML SOPN Inject 16 units daily. Adjust dose up or down by 2 units every 4 days until AM fasting sugar is . Max dose 50 units. Replaces lantus. 5 pen 5    metoprolol succinate (TOPROL XL) 25 MG extended release tablet Take 1 tablet by mouth daily 90 tablet 3    omeprazole (PRILOSEC) 40 MG delayed release capsule Take 1 capsule by mouth daily 90 capsule 3    glipiZIDE (GLUCOTROL) 5 MG tablet Take 1 tablet by mouth 2 times daily (before meals) 180 tablet 3    blood glucose test strips (BECK CONTOUR NEXT TEST) strip 1 each by In Vitro route daily As needed. 100 each 3    silver sulfADIAZINE (SILVADENE) 1 % cream Apply topically daily.  50 g 3    Omega-3 Fatty Acids (FISH OIL) 1000 MG CAPS Take 1 capsule by mouth 2 times daily 180 capsule 2    insulin glargine (LANTUS SOLOSTAR) 100 UNIT/ML injection pen Inject 30 Units into the skin nightly 5 pen 3    Insulin Pen Needle 32G X 4 MM MISC 1 each by Does not apply route daily 100 each 3    VENTOLIN  (90 Base) MCG/ACT inhaler Inhale 1 puff into the lungs 4 times daily 3 Inhaler 1    simvastatin (ZOCOR) 40 MG tablet TAKE ONE TABLET BY MOUTH EVERY DAY 90 tablet 3    vitamin D (ERGOCALCIFEROL) 67965 units CAPS capsule TAKE ONE CAPSULE BY MOUTH TWICE WEEKLY ON TUESDAY AND FRIDAY 8 capsule 3    cloNIDine (CATAPRES) 0.1 MG tablet TAKE ONE TABLET BY MOUTH TWICE DAILY 180 tablet 3    nystatin (MYCOSTATIN) 199321 UNIT/ML suspension Take 5 mLs by mouth 4 times daily 1 Bottle 2    nitroGLYCERIN (NITROSTAT) 0.4 MG SL tablet Place 1 tablet under the tongue every 5 minutes as needed for Chest pain up to max of 3 total doses. If no relief after 1 dose, call 911. 25 tablet 1    OXYGEN Inhale into the lungs every 8 hours       No current facility-administered medications for this visit. Allergies   Allergen Reactions    Levaquin [Levofloxacin In D5w] Anaphylaxis    Noroxin [Norfloxacin] Shortness Of Breath    Prozac [Fluoxetine Hcl]        Past Surgical History:   Procedure Laterality Date   [facility] CARDIAC SURGERY      october 8th 2009    CHOLECYSTECTOMY, LAPAROSCOPIC      CORONARY ARTERY BYPASS GRAFT      8/8 triple       Social History     Tobacco Use    Smoking status: Current Every Day Smoker     Packs/day: 1.00     Years: 15.00     Pack years: 15.00     Types: Cigarettes    Smokeless tobacco: Never Used   Substance Use Topics    Alcohol use: No    Drug use: No       Family History   Problem Relation Age of Onset    Cancer Mother     Kidney Disease Father         renal failure       /84 (Site: Right Upper Arm, Position: Sitting, Cuff Size: Medium Adult)   Pulse 104   Temp 96.9 °F (36.1 °C) (Oral)   Resp 16   Ht 5' 5\" (1.651 m)   Wt 188 lb (85.3 kg)   SpO2 97%   BMI 31.28 kg/m²     Physical Exam   Constitutional: She is oriented to person, place, and time. She appears well-developed and well-nourished. No distress. HENT:   Head: Normocephalic and atraumatic. Right Ear: External ear normal.   Left Ear: External ear normal.   Nose: Nose normal.   Eyes: Conjunctivae and EOM are normal. Right eye exhibits no discharge. Left eye exhibits no discharge. No scleral icterus. Neck: Neck supple. No tracheal deviation present. No thyromegaly present.    Cardiovascular: Normal rate, regular rhythm, normal heart

## 2019-07-03 DIAGNOSIS — Z79.4 TYPE 2 DIABETES MELLITUS WITH HYPERGLYCEMIA, WITH LONG-TERM CURRENT USE OF INSULIN (HCC): Primary | ICD-10-CM

## 2019-07-03 DIAGNOSIS — E11.65 TYPE 2 DIABETES MELLITUS WITH HYPERGLYCEMIA, WITH LONG-TERM CURRENT USE OF INSULIN (HCC): Primary | ICD-10-CM

## 2019-07-12 ASSESSMENT — ENCOUNTER SYMPTOMS
DIARRHEA: 0
EYE PAIN: 0
ABDOMINAL PAIN: 0
NAUSEA: 0
SHORTNESS OF BREATH: 0
CONSTIPATION: 0
VOMITING: 0
COUGH: 0
EYE DISCHARGE: 0
BACK PAIN: 1
RHINORRHEA: 0

## 2019-07-12 NOTE — PATIENT INSTRUCTIONS
which helps tilt your hips forward. This takes pressure off your lower back. · Try sitting on an exercise ball. It can rock from side to side, which helps keep your back loose. · When driving, keep your knees nearly level with your hips. Sit straight, and drive with both hands on the steering wheel. Your arms should be in a slightly bent position. Reduce stress on your back through careful lifting  · Squat down, bending at the hips and knees only. If you need to, put one knee to the floor and extend your other knee in front of you, bent at a right angle (half kneeling). · Press your chest straight forward. This helps keep your upper back straight while keeping a slight arch in your low back. · Hold the load as close to your body as possible, at the level of your belly button (navel). · Use your feet to change direction, taking small steps. · Lead with your hips as you change direction. Keep your shoulders in line with your hips as you move. · Set down your load carefully, squatting with your knees and hips only. Exercise and stretch your back  · Do some exercise on most days of the week, if your doctor says it is okay. You can walk, run, swim, or cycle. · Stretch your back muscles. Here are a few exercises to try:  ? Lie on your back, and gently pull one bent knee to your chest. Put that foot back on the floor, and then pull the other knee to your chest.  ? Do pelvic tilts. Lie on your back with your knees bent. Tighten your stomach muscles. Pull your belly button (navel) in and up toward your ribs. You should feel like your back is pressing to the floor and your hips and pelvis are slightly lifting off the floor. Hold for 6 seconds while breathing smoothly. ? Sit with your back flat against a wall. · Keep your core muscles strong. The muscles of your back, belly (abdomen), and buttocks support your spine. ? Pull in your belly and imagine pulling your navel toward your spine.  Hold this for 6 seconds,

## 2019-08-12 ENCOUNTER — TELEPHONE (OUTPATIENT)
Dept: FAMILY MEDICINE CLINIC | Age: 59
End: 2019-08-12

## 2019-08-13 ENCOUNTER — PATIENT MESSAGE (OUTPATIENT)
Dept: FAMILY MEDICINE CLINIC | Age: 59
End: 2019-08-13

## 2019-09-05 DIAGNOSIS — E55.9 VITAMIN D DEFICIENCY: ICD-10-CM

## 2019-09-05 DIAGNOSIS — Z00.00 HEALTH CARE MAINTENANCE: ICD-10-CM

## 2019-09-05 RX ORDER — ERGOCALCIFEROL 1.25 MG/1
CAPSULE ORAL
Qty: 8 CAPSULE | Refills: 2 | OUTPATIENT
Start: 2019-09-05

## 2019-10-25 DIAGNOSIS — Z79.4 TYPE 2 DIABETES MELLITUS WITH HYPERGLYCEMIA, WITH LONG-TERM CURRENT USE OF INSULIN (HCC): ICD-10-CM

## 2019-10-25 DIAGNOSIS — Z00.00 HEALTH CARE MAINTENANCE: ICD-10-CM

## 2019-10-25 DIAGNOSIS — E11.65 TYPE 2 DIABETES MELLITUS WITH HYPERGLYCEMIA, WITH LONG-TERM CURRENT USE OF INSULIN (HCC): ICD-10-CM

## 2019-10-25 DIAGNOSIS — F41.9 ANXIETY AND DEPRESSION: ICD-10-CM

## 2019-10-25 DIAGNOSIS — F32.A ANXIETY AND DEPRESSION: ICD-10-CM

## 2019-10-28 RX ORDER — CLONIDINE HYDROCHLORIDE 0.1 MG/1
0.1 TABLET ORAL 2 TIMES DAILY
Qty: 180 TABLET | Refills: 0 | Status: SHIPPED | OUTPATIENT
Start: 2019-10-28

## 2019-10-28 RX ORDER — GLIPIZIDE 5 MG/1
5 TABLET ORAL
Qty: 180 TABLET | Refills: 0 | Status: SHIPPED | OUTPATIENT
Start: 2019-10-28 | End: 2020-03-11 | Stop reason: SDUPTHER

## 2019-10-28 RX ORDER — VENLAFAXINE 75 MG/1
75 TABLET ORAL 3 TIMES DAILY
Qty: 90 TABLET | Refills: 2 | Status: SHIPPED | OUTPATIENT
Start: 2019-10-28 | End: 2020-02-04 | Stop reason: SDUPTHER

## 2019-10-28 RX ORDER — OMEPRAZOLE 40 MG/1
40 CAPSULE, DELAYED RELEASE ORAL DAILY
Qty: 90 CAPSULE | Refills: 0 | Status: SHIPPED | OUTPATIENT
Start: 2019-10-28 | End: 2020-03-11 | Stop reason: SDUPTHER

## 2019-10-28 RX ORDER — METOPROLOL SUCCINATE 25 MG/1
25 TABLET, EXTENDED RELEASE ORAL DAILY
Qty: 90 TABLET | Refills: 0 | Status: SHIPPED | OUTPATIENT
Start: 2019-10-28 | End: 2020-03-11 | Stop reason: SDUPTHER

## 2020-02-04 RX ORDER — VENLAFAXINE 75 MG/1
75 TABLET ORAL 3 TIMES DAILY
Qty: 90 TABLET | Refills: 1 | Status: SHIPPED | OUTPATIENT
Start: 2020-02-04 | End: 2020-03-11

## 2020-02-04 RX ORDER — IBUPROFEN 600 MG/1
TABLET ORAL
Qty: 42 TABLET | Refills: 3 | OUTPATIENT
Start: 2020-02-04

## 2020-02-04 RX ORDER — CHLORAL HYDRATE 500 MG
CAPSULE ORAL
Qty: 100 CAPSULE | Refills: 3 | OUTPATIENT
Start: 2020-02-04

## 2020-02-04 RX ORDER — METOPROLOL SUCCINATE 25 MG/1
25 TABLET, EXTENDED RELEASE ORAL DAILY
Qty: 90 TABLET | Refills: 0 | OUTPATIENT
Start: 2020-02-04

## 2020-03-11 ENCOUNTER — OFFICE VISIT (OUTPATIENT)
Dept: FAMILY MEDICINE CLINIC | Age: 60
End: 2020-03-11
Payer: MEDICARE

## 2020-03-11 VITALS
TEMPERATURE: 97.5 F | DIASTOLIC BLOOD PRESSURE: 80 MMHG | OXYGEN SATURATION: 96 % | SYSTOLIC BLOOD PRESSURE: 118 MMHG | BODY MASS INDEX: 33.82 KG/M2 | HEART RATE: 105 BPM | RESPIRATION RATE: 16 BRPM | HEIGHT: 65 IN | WEIGHT: 203 LBS

## 2020-03-11 DIAGNOSIS — E11.65 TYPE 2 DIABETES MELLITUS WITH HYPERGLYCEMIA, WITH LONG-TERM CURRENT USE OF INSULIN (HCC): ICD-10-CM

## 2020-03-11 DIAGNOSIS — Z79.4 TYPE 2 DIABETES MELLITUS WITH HYPERGLYCEMIA, WITH LONG-TERM CURRENT USE OF INSULIN (HCC): ICD-10-CM

## 2020-03-11 LAB — HBA1C MFR BLD: 10.1 % (ref 4–6)

## 2020-03-11 PROCEDURE — 99214 OFFICE O/P EST MOD 30 MIN: CPT | Performed by: FAMILY MEDICINE

## 2020-03-11 RX ORDER — METOPROLOL SUCCINATE 25 MG/1
25 TABLET, EXTENDED RELEASE ORAL DAILY
Qty: 90 TABLET | Refills: 3 | Status: SHIPPED | OUTPATIENT
Start: 2020-03-11 | End: 2022-08-23 | Stop reason: SDUPTHER

## 2020-03-11 RX ORDER — BLOOD-GLUCOSE METER
1 KIT MISCELLANEOUS 3 TIMES DAILY
Qty: 1 KIT | Refills: 0 | Status: SHIPPED | OUTPATIENT
Start: 2020-03-11

## 2020-03-11 RX ORDER — OMEPRAZOLE 40 MG/1
40 CAPSULE, DELAYED RELEASE ORAL DAILY
Qty: 90 CAPSULE | Refills: 3 | Status: SHIPPED | OUTPATIENT
Start: 2020-03-11 | End: 2020-12-21

## 2020-03-11 RX ORDER — (INSULIN DEGLUDEC AND LIRAGLUTIDE) 100; 3.6 [IU]/ML; MG/ML
INJECTION, SOLUTION SUBCUTANEOUS
Qty: 5 PEN | Refills: 5 | Status: SHIPPED | OUTPATIENT
Start: 2020-03-11 | End: 2020-06-18 | Stop reason: SDUPTHER

## 2020-03-11 RX ORDER — SIMVASTATIN 40 MG
TABLET ORAL
Qty: 90 TABLET | Refills: 3 | Status: SHIPPED | OUTPATIENT
Start: 2020-03-11 | End: 2022-08-23

## 2020-03-11 RX ORDER — VENLAFAXINE HYDROCHLORIDE 150 MG/1
150 CAPSULE, EXTENDED RELEASE ORAL DAILY
Qty: 90 CAPSULE | Refills: 1 | Status: SHIPPED | OUTPATIENT
Start: 2020-03-11 | End: 2020-06-09

## 2020-03-11 RX ORDER — GLIPIZIDE 5 MG/1
5 TABLET ORAL
Qty: 180 TABLET | Refills: 1 | Status: SHIPPED | OUTPATIENT
Start: 2020-03-11 | End: 2020-06-09

## 2020-03-11 RX ORDER — LORATADINE 10 MG/1
10 TABLET ORAL DAILY
Qty: 30 TABLET | Refills: 2 | Status: SHIPPED | OUTPATIENT
Start: 2020-03-11 | End: 2022-08-23

## 2020-03-11 ASSESSMENT — PATIENT HEALTH QUESTIONNAIRE - PHQ9
2. FEELING DOWN, DEPRESSED OR HOPELESS: 0
SUM OF ALL RESPONSES TO PHQ9 QUESTIONS 1 & 2: 0
1. LITTLE INTEREST OR PLEASURE IN DOING THINGS: 0
SUM OF ALL RESPONSES TO PHQ QUESTIONS 1-9: 0
SUM OF ALL RESPONSES TO PHQ QUESTIONS 1-9: 0

## 2020-03-11 NOTE — PROGRESS NOTES
appetite change, fatigue and fever. HENT: Negative for congestion and rhinorrhea. Eyes: Negative for pain and discharge. Respiratory: Negative for cough and shortness of breath. Cardiovascular: Negative for chest pain and palpitations. Gastrointestinal: Negative for abdominal pain, constipation, diarrhea, nausea and vomiting. Endocrine: Negative for cold intolerance and heat intolerance. Genitourinary: Negative for dysuria and hematuria. Musculoskeletal: Positive for arthralgias, back pain and gait problem. Skin: Negative for rash and wound. Neurological: Negative for syncope and weakness. Hematological: Negative for adenopathy. Does not bruise/bleed easily. Psychiatric/Behavioral: Positive for dysphoric mood. Negative for self-injury and suicidal ideas. The patient is nervous/anxious. Past Medical History:   Diagnosis Date    Anxiety     Arthritis     Bilateral low back pain with sciatica 12/21/2015    Bladder incontinence     CAD (coronary artery disease)     CAD (coronary artery disease)     Cervical cancer (HCC)     H/O    COPD (chronic obstructive pulmonary disease) (HCC)     Depression     Diabetes mellitus (HCC)     GERD (gastroesophageal reflux disease)     History of blood transfusion     Hyperlipidemia     Hypertension     Hypoxia        Current Outpatient Medications   Medication Sig Dispense Refill    venlafaxine (EFFEXOR XR) 150 MG extended release capsule Take 1 capsule by mouth daily 90 capsule 1    glipiZIDE (GLUCOTROL) 5 MG tablet Take 1 tablet by mouth 2 times daily (before meals) 180 tablet 1    Insulin Degludec-Liraglutide (XULTOPHY) 100-3.6 UNIT-MG/ML SOPN Inject 16 units daily. Adjust dose up or down by 2 units every 4 days until AM fasting sugar is . Max dose 50 units. Replaces lantus.  5 pen 5    metoprolol succinate (TOPROL XL) 25 MG extended release tablet Take 1 tablet by mouth daily 90 tablet 3    omeprazole (PRILOSEC) 40 MG Every Day Smoker     Packs/day: 1.00     Years: 15.00     Pack years: 15.00     Types: Cigarettes    Smokeless tobacco: Never Used   Substance Use Topics    Alcohol use: No    Drug use: No       Family History   Problem Relation Age of Onset    Cancer Mother     Kidney Disease Father         renal failure       /80 (Site: Left Upper Arm, Position: Sitting, Cuff Size: Medium Adult)   Pulse 105   Temp 97.5 °F (36.4 °C) (Oral)   Resp 16   Ht 5' 5\" (1.651 m)   Wt 203 lb (92.1 kg)   SpO2 96%   BMI 33.78 kg/m²     Physical Exam  Vitals signs and nursing note reviewed. Constitutional:       General: She is not in acute distress. Appearance: She is well-developed. She is not diaphoretic. HENT:      Head: Normocephalic and atraumatic. Right Ear: External ear normal.      Left Ear: External ear normal.      Nose: Nose normal.      Mouth/Throat:      Mouth: Mucous membranes are moist.      Pharynx: Oropharynx is clear. Eyes:      General: No scleral icterus. Right eye: No discharge. Left eye: No discharge. Conjunctiva/sclera: Conjunctivae normal.   Neck:      Musculoskeletal: Neck supple. Thyroid: No thyromegaly. Trachea: No tracheal deviation. Cardiovascular:      Rate and Rhythm: Normal rate and regular rhythm. Heart sounds: Normal heart sounds. No murmur. No friction rub. No gallop. Pulmonary:      Effort: Pulmonary effort is normal. No respiratory distress. Breath sounds: Normal breath sounds. No wheezing or rales. Abdominal:      General: Bowel sounds are normal. There is no distension. Palpations: Abdomen is soft. Tenderness: There is no abdominal tenderness. Musculoskeletal:         General: No deformity (No gross deformities of upper or lower extremities). Right lower leg: No edema. Left lower leg: No edema. Lymphadenopathy:      Cervical: No cervical adenopathy. Skin:     General: Skin is warm and dry. Findings: No erythema or rash. Neurological:      Mental Status: She is alert and oriented to person, place, and time. Cranial Nerves: No cranial nerve deficit. Psychiatric:         Behavior: Behavior normal.         Thought Content: Thought content normal.         Assessment:       ICD-10-CM    1. Type 2 diabetes mellitus with hyperglycemia, with long-term current use of insulin (HCC) E11.65 Insulin Degludec-Liraglutide (XULTOPHY) 100-3.6 UNIT-MG/ML SOPN    Z79.4 CBC Auto Differential     Comprehensive Metabolic Panel     Hemoglobin A1C     Microalbumin / Creatinine Urine Ratio     glucose monitoring kit (FREESTYLE) monitoring kit    Discussed importance of DM diet and benefits of exercise. Discussed proper use of medication. Stressed proper foot care and monitoring. Discussed the importance of yearly eye exams. 2. Essential hypertension I10 metoprolol succinate (TOPROL XL) 25 MG extended release tablet     Microalbumin / Creatinine Urine Ratio    Controlled current medications. Will continue continue to monitor. 3. Mixed hyperlipidemia E78.2 simvastatin (ZOCOR) 40 MG tablet     Lipid Panel    Tolerating Zocor. Will continue continue to monitor. 4. Anxiety and depression F41.9 venlafaxine (EFFEXOR XR) 150 MG extended release capsule    F32.9 TSH without Reflex    We will continue to monitor with Effexor and adjust the course dictates. 5. Chronic neck and back pain M54.2 [facility] [place]    X98.5     F79.73  discussed concerns with her testing positive for meth on 2 separate occasions and uncertainty if it could be from another cause but discussed that it would be pain management discretion on how to proceed and what they would feel comfortable doing in efforts to improve her pain symptoms.    6. Gastroesophageal reflux disease, esophagitis presence not specified K21.9 omeprazole (PRILOSEC) 40 MG delayed release capsule    Doing well with current dose of Prilosec. Will continue to monitor and adjust course dictates. 7. Chronic obstructive pulmonary disease, unspecified COPD type ([facility]) J44.9 VENTOLIN  (90 Base) MCG/ACT inhaler    Doing well at this time. We will continue to monitor and adjust course dictates. 8. Posterior rhinorrhea J34.89 loratadine (CLARITIN) 10 MG tablet     9. Claudication [facility]) I73.9  denies any symptoms at this time. We will continue to monitor with further adjustments and management as course dictates. Plan:  Discussed proper use of medication. Discussed signs and symptoms requiring medical attention. All questions were answered and patient voiced understanding and agreement with plan as discussed.     Orders Placed This Encounter   Procedures    CBC Auto Differential     Standing Status:   Future     Standing Expiration Date:   3/11/2021    Comprehensive Metabolic Panel     Standing Status:   Future     Standing Expiration Date:   3/11/2021    Hemoglobin A1C     Standing Status:   Future     Standing Expiration Date:   3/11/2021    Lipid Panel     Standing Status:   Future     Standing Expiration Date:   3/11/2021     Order Specific Question:   Is Patient Fasting?/# of Hours     Answer:   fasting    Microalbumin / Creatinine Urine Ratio     Standing Status:   Future     Standing Expiration Date:   3/11/2021    TSH without Reflex     Standing Status:   Future     Standing Expiration Date:   3/11/2021   [address] [facility] [place]     Referral Priority:   Routine     Referral Type:   Eval and Treat     Referral Reason:   Specialty Services Required     Referred to Provider:   MAGDALENA Medrano     Requested Specialty:   Nurse Practitioner     Number of Visits Requested:   1     Orders Placed This Encounter   Medications    venlafaxine (EFFEXOR XR) 150 MG extended release capsule     Sig: Take 1 capsule by mouth daily     Dispense:  90 capsule     Refill:  1    glipiZIDE (GLUCOTROL) 5 MG tablet     Sig: Take 1 tablet by mouth 2 times daily (before meals)     Dispense:  180 tablet     Refill:  1    Insulin Degludec-Liraglutide (XULTOPHY) 100-3.6 UNIT-MG/ML SOPN     Sig: Inject 16 units daily. Adjust dose up or down by 2 units every 4 days until AM fasting sugar is . Max dose 50 units. Replaces lantus. Dispense:  5 pen     Refill:  5     Please provide needed needles for injections.  metoprolol succinate (TOPROL XL) 25 MG extended release tablet     Sig: Take 1 tablet by mouth daily     Dispense:  90 tablet     Refill:  3    omeprazole (PRILOSEC) 40 MG delayed release capsule     Sig: Take 1 capsule by mouth daily     Dispense:  90 capsule     Refill:  3    simvastatin (ZOCOR) 40 MG tablet     Sig: TAKE ONE TABLET BY MOUTH EVERY DAY     Dispense:  90 tablet     Refill:  3    VENTOLIN  (90 Base) MCG/ACT inhaler     Sig: Inhale 1 puff into the lungs 4 times daily     Dispense:  3 Inhaler     Refill:  3    glucose monitoring kit (FREESTYLE) monitoring kit     Si kit by Does not apply route 3 times daily     Dispense:  1 kit     Refill:  0     Please provide brand covered by insurance with affordable strips. Include lancets and test strips #100 with 11 refills.     loratadine (CLARITIN) 10 MG tablet     Sig: Take 1 tablet by mouth daily     Dispense:  30 tablet     Refill:  2     Medications Discontinued During This Encounter   Medication Reason    venlafaxine (EFFEXOR) 75 MG tablet     loratadine (CLARITIN) 10 MG tablet     insulin glargine (LANTUS SOLOSTAR) 100 UNIT/ML injection pen     glipiZIDE (GLUCOTROL) 5 MG tablet REORDER    Insulin Degludec-Liraglutide (XULTOPHY) 100-3.6 UNIT-MG/ML SOPN REORDER    metoprolol succinate (TOPROL XL) 25 MG extended release tablet REORDER    omeprazole (PRILOSEC) 40 MG delayed release capsule REORDER    simvastatin (ZOCOR) 40 MG tablet REORDER    VENTOLIN  (90 Base) MCG/ACT inhaler REORDER     Patient Instructions   Patient given educational handouts and has had all questions answered. Patient voices understanding and agrees to plans along with risks and benefits of plan. Patient is instructed to continue prior meds,diet, and exercise plans as instructed. Patient agrees to follow up as instructed and sooner if needed. Patient agrees to go to ER if condition becomes emergent. Return in about 3 months (around 6/11/2020), or if symptoms worsen or fail to improve.

## 2020-03-18 ENCOUNTER — HOSPITAL ENCOUNTER (OUTPATIENT)
Dept: PAIN MANAGEMENT | Age: 60
Discharge: HOME | End: 2020-03-18
Payer: MEDICARE

## 2020-03-19 PROBLEM — I73.9 CLAUDICATION (HCC): Status: ACTIVE | Noted: 2020-03-19

## 2020-03-19 ASSESSMENT — ENCOUNTER SYMPTOMS
DIARRHEA: 0
SHORTNESS OF BREATH: 0
EYE DISCHARGE: 0
COUGH: 0
RHINORRHEA: 0
VOMITING: 0
BACK PAIN: 1
ABDOMINAL PAIN: 0
NAUSEA: 0
CONSTIPATION: 0
EYE PAIN: 0

## 2020-03-19 NOTE — PATIENT INSTRUCTIONS
social groups, or volunteer to help others. Being alone sometimes makes things seem worse than they are. · Get at least 30 minutes of exercise on most days of the week to relieve stress. Walking is a good choice. You also may want to do other activities, such as running, swimming, cycling, or playing tennis or team sports. Relaxation techniques  Do relaxation exercises 10 to 20 minutes a day. You can play soothing, relaxing music while you do them, if you wish. · Tell others in your house that you are going to do your relaxation exercises. Ask them not to disturb you. · Find a comfortable place, away from all distractions and noise. · Lie down on your back, or sit with your back straight. · Focus on your breathing. Make it slow and steady. · Breathe in through your nose. Breathe out through either your nose or mouth. · Breathe deeply, filling up the area between your navel and your rib cage. Breathe so that your belly goes up and down. · Do not hold your breath. · Breathe like this for 5 to 10 minutes. Notice the feeling of calmness throughout your whole body. As you continue to breathe slowly and deeply, relax by doing the following for another 5 to 10 minutes:  · Tighten and relax each muscle group in your body. You can begin at your toes and work your way up to your head. · Imagine your muscle groups relaxing and becoming heavy. · Empty your mind of all thoughts. · Let yourself relax more and more deeply. · Become aware of the state of calmness that surrounds you. · When your relaxation time is over, you can bring yourself back to alertness by moving your fingers and toes and then your hands and feet and then stretching and moving your entire body. Sometimes people fall asleep during relaxation, but they usually wake up shortly afterward. · Always give yourself time to return to full alertness before you drive a car or do anything that might cause an accident if you are not fully alert.  Never play a relaxation tape while you drive a car. When should you call for help? Call 911 anytime you think you may need emergency care. For example, call if:    · You feel you cannot stop from hurting yourself or someone else.   Anne [place] the numbers for these national suicide hotlines: 9-173-217-TALK (5-487.546.2723) and 0-845-ELVWRVX (0-623.999.5806). If you or someone you know talks about suicide or feeling hopeless, get help right away.   Watch closely for changes in your health, and be sure to contact your doctor if:    · You have anxiety or fear that affects your life.     · You have symptoms of anxiety that are new or different from those you had before. Where can you learn more? Go to https://[email or web address].fmpeLucid Holdingseb.[email or web address]. org and sign in to your [employer] account. Enter P754 in the Fengguo box to learn more about \"Anxiety Disorder: Care Instructions. \"     If you do not have an account, please click on the \"Sign Up Now\" link. Current as of: May 28, 2019Content Version: 12.4  © 4194-2251 Healthwise, Incorporated. Care instructions adapted under license by [facility] ([facility]). If you have questions about a medical condition or this instruction, always ask your healthcare professional. [address] any warranty or liability for your use of this information.

## 2020-03-31 RX ORDER — NITROGLYCERIN 0.4 MG/1
TABLET SUBLINGUAL
Qty: 25 TABLET | Refills: 12 | OUTPATIENT
Start: 2020-03-31

## 2020-03-31 RX ORDER — IBUPROFEN 600 MG/1
TABLET ORAL
Qty: 42 TABLET | Refills: 3 | OUTPATIENT
Start: 2020-03-31

## 2020-03-31 RX ORDER — CHLORAL HYDRATE 500 MG
CAPSULE ORAL
Qty: 100 CAPSULE | Refills: 3 | OUTPATIENT
Start: 2020-03-31

## 2020-03-31 RX ORDER — PERPHENAZINE 16 MG/1
TABLET, FILM COATED ORAL
Qty: 100 STRIP | Refills: 11 | OUTPATIENT
Start: 2020-03-31

## 2020-04-30 RX ORDER — NITROGLYCERIN 0.4 MG/1
TABLET SUBLINGUAL
Qty: 25 TABLET | Refills: 12 | OUTPATIENT
Start: 2020-04-30

## 2020-06-09 RX ORDER — VENLAFAXINE HYDROCHLORIDE 150 MG/1
CAPSULE, EXTENDED RELEASE ORAL
Qty: 90 CAPSULE | Refills: 1 | Status: SHIPPED | OUTPATIENT
Start: 2020-06-09 | End: 2020-12-21

## 2020-06-09 RX ORDER — GLIPIZIDE 5 MG/1
TABLET ORAL
Qty: 180 TABLET | Refills: 1 | Status: SHIPPED | OUTPATIENT
Start: 2020-06-09 | End: 2022-09-08

## 2020-06-16 DIAGNOSIS — E78.2 MIXED HYPERLIPIDEMIA: ICD-10-CM

## 2020-06-16 DIAGNOSIS — F32.A ANXIETY AND DEPRESSION: ICD-10-CM

## 2020-06-16 DIAGNOSIS — E11.65 TYPE 2 DIABETES MELLITUS WITH HYPERGLYCEMIA, WITH LONG-TERM CURRENT USE OF INSULIN (HCC): ICD-10-CM

## 2020-06-16 DIAGNOSIS — I10 ESSENTIAL HYPERTENSION: ICD-10-CM

## 2020-06-16 DIAGNOSIS — Z79.4 TYPE 2 DIABETES MELLITUS WITH HYPERGLYCEMIA, WITH LONG-TERM CURRENT USE OF INSULIN (HCC): ICD-10-CM

## 2020-06-16 DIAGNOSIS — F41.9 ANXIETY AND DEPRESSION: ICD-10-CM

## 2020-06-16 LAB
ALBUMIN SERPL-MCNC: 3.9 G/DL (ref 3.5–5.2)
ALP BLD-CCNC: 76 U/L (ref 35–104)
ALT SERPL-CCNC: 8 U/L (ref 5–33)
ANION GAP SERPL CALCULATED.3IONS-SCNC: 13 MMOL/L (ref 7–19)
AST SERPL-CCNC: 10 U/L (ref 5–32)
BASOPHILS ABSOLUTE: 0.1 K/UL (ref 0–0.2)
BASOPHILS RELATIVE PERCENT: 0.6 % (ref 0–1)
BILIRUB SERPL-MCNC: <0.2 MG/DL (ref 0.2–1.2)
BUN BLDV-MCNC: 9 MG/DL (ref 6–20)
CALCIUM SERPL-MCNC: 8.5 MG/DL (ref 8.6–10)
CHLORIDE BLD-SCNC: 92 MMOL/L (ref 98–111)
CHOLESTEROL, TOTAL: 221 MG/DL (ref 160–199)
CO2: 29 MMOL/L (ref 22–29)
CREAT SERPL-MCNC: 0.7 MG/DL (ref 0.5–0.9)
CREATININE URINE: 159.2 MG/DL (ref 4.2–622)
EOSINOPHILS ABSOLUTE: 0 K/UL (ref 0–0.6)
EOSINOPHILS RELATIVE PERCENT: 0 % (ref 0–5)
GFR NON-AFRICAN AMERICAN: >60
GLUCOSE BLD-MCNC: 352 MG/DL (ref 74–109)
HBA1C MFR BLD: 8.7 % (ref 4–6)
HCT VFR BLD CALC: 39.7 % (ref 37–47)
HDLC SERPL-MCNC: 51 MG/DL (ref 65–121)
HEMOGLOBIN: 12.8 G/DL (ref 12–16)
IMMATURE GRANULOCYTES #: 0.1 K/UL
LDL CHOLESTEROL CALCULATED: 136 MG/DL
LYMPHOCYTES ABSOLUTE: 3 K/UL (ref 1.1–4.5)
LYMPHOCYTES RELATIVE PERCENT: 28.2 % (ref 20–40)
MCH RBC QN AUTO: 27 PG (ref 27–31)
MCHC RBC AUTO-ENTMCNC: 32.2 G/DL (ref 33–37)
MCV RBC AUTO: 83.8 FL (ref 81–99)
MICROALBUMIN UR-MCNC: 1.4 MG/DL (ref 0–19)
MICROALBUMIN/CREAT UR-RTO: 8.8 MG/G
MONOCYTES ABSOLUTE: 0.7 K/UL (ref 0–0.9)
MONOCYTES RELATIVE PERCENT: 7 % (ref 0–10)
NEUTROPHILS ABSOLUTE: 6.7 K/UL (ref 1.5–7.5)
NEUTROPHILS RELATIVE PERCENT: 63.6 % (ref 50–65)
PDW BLD-RTO: 13.2 % (ref 11.5–14.5)
PLATELET # BLD: 281 K/UL (ref 130–400)
PMV BLD AUTO: 11 FL (ref 9.4–12.3)
POTASSIUM SERPL-SCNC: 3.4 MMOL/L (ref 3.5–5)
RBC # BLD: 4.74 M/UL (ref 4.2–5.4)
SODIUM BLD-SCNC: 134 MMOL/L (ref 136–145)
TOTAL PROTEIN: 7 G/DL (ref 6.6–8.7)
TRIGL SERPL-MCNC: 169 MG/DL (ref 0–149)
TSH SERPL DL<=0.05 MIU/L-ACNC: 1.45 UIU/ML (ref 0.27–4.2)
WBC # BLD: 10.5 K/UL (ref 4.8–10.8)

## 2020-06-18 ENCOUNTER — HOSPITAL ENCOUNTER (OUTPATIENT)
Dept: GENERAL RADIOLOGY | Age: 60
Discharge: HOME | End: 2020-06-18
Payer: MEDICARE

## 2020-06-18 ENCOUNTER — OFFICE VISIT (OUTPATIENT)
Dept: FAMILY MEDICINE CLINIC | Age: 60
End: 2020-06-18
Payer: MEDICARE

## 2020-06-18 VITALS
HEART RATE: 90 BPM | DIASTOLIC BLOOD PRESSURE: 88 MMHG | HEIGHT: 65 IN | RESPIRATION RATE: 16 BRPM | WEIGHT: 217 LBS | SYSTOLIC BLOOD PRESSURE: 140 MMHG | TEMPERATURE: 97.3 F | BODY MASS INDEX: 36.15 KG/M2 | OXYGEN SATURATION: 95 %

## 2020-06-18 PROCEDURE — 99214 OFFICE O/P EST MOD 30 MIN: CPT | Performed by: FAMILY MEDICINE

## 2020-06-18 PROCEDURE — 73660 X-RAY EXAM OF TOE(S): CPT

## 2020-06-18 PROCEDURE — 3052F HG A1C>EQUAL 8.0%<EQUAL 9.0%: CPT | Performed by: FAMILY MEDICINE

## 2020-06-18 RX ORDER — FUROSEMIDE 20 MG/1
20 TABLET ORAL DAILY PRN
Qty: 30 TABLET | Refills: 2 | Status: SHIPPED | OUTPATIENT
Start: 2020-06-18 | End: 2021-03-24

## 2020-06-18 RX ORDER — NAPROXEN SODIUM 550 MG/1
550 TABLET ORAL 2 TIMES DAILY WITH MEALS
Qty: 60 TABLET | Refills: 3 | Status: SHIPPED | OUTPATIENT
Start: 2020-06-18 | End: 2022-08-23

## 2020-06-18 RX ORDER — (INSULIN DEGLUDEC AND LIRAGLUTIDE) 100; 3.6 [IU]/ML; MG/ML
INJECTION, SOLUTION SUBCUTANEOUS
Qty: 5 PEN | Refills: 5 | Status: SHIPPED | OUTPATIENT
Start: 2020-06-18 | End: 2021-03-24

## 2020-06-18 RX ORDER — NITROGLYCERIN 0.4 MG/1
0.4 TABLET SUBLINGUAL EVERY 5 MIN PRN
Qty: 25 TABLET | Refills: 1 | Status: SHIPPED | OUTPATIENT
Start: 2020-06-18 | End: 2022-09-08 | Stop reason: SDUPTHER

## 2020-06-18 RX ORDER — CHLORAL HYDRATE 500 MG
1000 CAPSULE ORAL 2 TIMES DAILY
Qty: 180 CAPSULE | Refills: 2 | Status: SHIPPED | OUTPATIENT
Start: 2020-06-18

## 2020-06-18 RX ORDER — ERGOCALCIFEROL 1.25 MG/1
CAPSULE ORAL
Qty: 8 CAPSULE | Refills: 3 | Status: SHIPPED | OUTPATIENT
Start: 2020-06-18 | End: 2020-09-17 | Stop reason: SDUPTHER

## 2020-06-18 NOTE — PROGRESS NOTES
pain is wishing to get in with a new pain management for long-term monitoring of her chronic pain issues. Review of Systems   Constitutional: Negative for activity change, appetite change, fatigue and fever. HENT: Negative for congestion and rhinorrhea. Eyes: Negative for pain and discharge. Respiratory: Negative for cough and shortness of breath. Cardiovascular: Positive for leg swelling. Negative for chest pain and palpitations. Gastrointestinal: Negative for abdominal pain, constipation, diarrhea, nausea and vomiting. Endocrine: Negative for cold intolerance and heat intolerance. Genitourinary: Negative for dysuria and hematuria. Musculoskeletal: Positive for arthralgias, back pain and gait problem. Skin: Negative for rash and wound. Neurological: Positive for weakness. Negative for syncope. Hematological: Negative for adenopathy. Does not bruise/bleed easily. Psychiatric/Behavioral: Positive for dysphoric mood. Negative for self-injury and suicidal ideas. The patient is nervous/anxious.         Past Medical History:   Diagnosis Date    Anxiety     Arthritis     Bilateral low back pain with sciatica 12/21/2015    Bladder incontinence     CAD (coronary artery disease)     CAD (coronary artery disease)     Cervical cancer (HCC)     H/O    COPD (chronic obstructive pulmonary disease) (HCC)     Depression     Diabetes mellitus (HCC)     GERD (gastroesophageal reflux disease)     History of blood transfusion     Hyperlipidemia     Hypertension     Hypoxia        Current Outpatient Medications   Medication Sig Dispense Refill    vitamin D (ERGOCALCIFEROL) 1.25 MG (25718 UT) CAPS capsule TAKE ONE CAPSULE BY MOUTH TWICE WEEKLY ON TUESDAY AND FRIDAY 8 capsule 3    Omega-3 Fatty Acids (FISH OIL) 1000 MG CAPS Take 1 capsule by mouth 2 times daily 180 capsule 2    nitroGLYCERIN (NITROSTAT) 0.4 MG SL tablet Place 1 tablet under the tongue every 5 minutes as needed for Chest pain up to max of 3 total doses. If no relief after 1 dose, call 911. 25 tablet 1    Insulin Degludec-Liraglutide (XULTOPHY) 100-3.6 UNIT-MG/ML SOPN Inject 26 units daily. Adjust dose up or down by 2 units every 4 days until AM fasting sugar is . Max dose 50 units. Replaces lantus. 5 pen 5    furosemide (LASIX) 20 MG tablet Take 1 tablet by mouth daily as needed (swelling) 30 tablet 2    naproxen sodium (ANAPROX) 550 MG tablet Take 1 tablet by mouth 2 times daily (with meals) 60 tablet 3    glipiZIDE (GLUCOTROL) 5 MG tablet TAKE ONE TABLET BY MOUTH TWICE A DAY BEFORE MEALS 180 tablet 1    venlafaxine (EFFEXOR XR) 150 MG extended release capsule TAKE ONE CAPSULE BY MOUTH DAILY 90 capsule 1    silver sulfADIAZINE (SILVADENE) 1 % cream Apply topically daily. 50 g 0    Insulin Pen Needle 32G X 4 MM MISC 1 each by Does not apply route daily 100 each 0    metoprolol succinate (TOPROL XL) 25 MG extended release tablet Take 1 tablet by mouth daily 90 tablet 3    omeprazole (PRILOSEC) 40 MG delayed release capsule Take 1 capsule by mouth daily 90 capsule 3    simvastatin (ZOCOR) 40 MG tablet TAKE ONE TABLET BY MOUTH EVERY DAY 90 tablet 3    VENTOLIN  (90 Base) MCG/ACT inhaler Inhale 1 puff into the lungs 4 times daily 3 Inhaler 3    glucose monitoring kit (FREESTYLE) monitoring kit 1 kit by Does not apply route 3 times daily 1 kit 0    loratadine (CLARITIN) 10 MG tablet Take 1 tablet by mouth daily 30 tablet 2    cloNIDine (CATAPRES) 0.1 MG tablet Take 1 tablet by mouth 2 times daily 180 tablet 0    diphenhydrAMINE (BENADRYL) 25 MG capsule Take 25 mg by mouth every 6 hours as needed      blood glucose test strips (BECK CONTOUR NEXT TEST) strip 1 each by In Vitro route daily As needed. 100 each 3    OXYGEN Inhale into the lungs every 8 hours       No current facility-administered medications for this visit.            Allergies   Allergen Reactions    Levaquin [Levofloxacin In D5w] Anaphylaxis    Tenderness: There is no abdominal tenderness. Musculoskeletal:         General: No deformity (No gross deformities of upper or lower extremities). Right lower leg: Edema present. Left lower leg: Edema present. Comments: Potentially some resolving bruising noted in her distal foot just proximal to the fourth and fifth toes bilaterally with tenderness noted more so in the fourth and fifth toes as well. No appreciable foot tenderness. No appreciable deformities. Lymphadenopathy:      Cervical: No cervical adenopathy. Skin:     General: Skin is warm and dry. Findings: No erythema or rash. Neurological:      Mental Status: She is alert and oriented to person, place, and time. Cranial Nerves: No cranial nerve deficit. Psychiatric:         Behavior: Behavior normal.         Thought Content: Thought content normal.         Assessment:       ICD-10-CM    1. Type 2 diabetes mellitus with hyperglycemia, with long-term current use of insulin (HCC) E11.65 Insulin Degludec-Liraglutide (XULTOPHY) 100-3.6 UNIT-MG/ML SOPN    Discussed importance of DM diet and benefits of exercise. Discussed proper use of medication. Stressed proper foot care and monitoring. Discussed the importance of yearly eye exams. Z79.4    2. Injury of left foot, initial encounter S99.922A XR TOE LEFT (MIN 2 VIEWS)     naproxen sodium (ANAPROX) 550 MG tablet    Discussed proper care of involved area and lifestyle modifications may build improve her symptoms. We will get an x-ray at this time continue to monitor and further treatment as course dictates. 3. Bilateral lower extremity edema R60.0 furosemide (LASIX) 20 MG tablet    Discussed proper use of medication. We will continue to monitor and further work-up and management as course dictates. 4. Mixed hyperlipidemia E78.2 Omega-3 Fatty Acids (FISH OIL) 1000 MG CAPS    Tolerating simvastatin. Will continue continue to monitor.    5. Essential hypertension I10 6. Vitamin D deficiency E55.9 vitamin D (ERGOCALCIFEROL) 1.25 MG (46640 UT) CAPS capsule   7. Decreased ambulation status Z74.09  provided prescription for cane for ambulatory purposes. 8. Falls frequently R29.6  discussed lifestyle modifications that may be able to reduce her falls and stressed the importance of proper use of walking cane. Discussed that if falls continued walker may provide further benefit if needed. 9. Chronic midline low back pain, unspecified whether sciatica present M54.5 naproxen sodium (ANAPROX) 550 MG tablet    G89.29 External Referral To Pain Clinic    With patient's desire to get set up with a new pain management facility we will get her referred to the pain management facility of her choosing continue to monitor and assist as needed. 10. Health care maintenance Z00.00 vitamin D (ERGOCALCIFEROL) 1.25 MG (23885 UT) CAPS capsule     Omega-3 Fatty Acids (FISH OIL) 1000 MG CAPS       Plan:  Discussed possible diagnosis, expected course, and proper use of medication, including OTC medications if prescription is too expensive or insurance does not cover. Discussed signs and symptoms requiring medical attention. All questions were answered and patient voiced understanding and agreement with plan as discussed. Orders Placed This Encounter   Procedures    XR TOE LEFT (MIN 2 VIEWS)     Standing Status:   Future     Number of Occurrences:   1     Standing Expiration Date:   6/18/2021     Order Specific Question:   Reason for exam:     Answer:   hit 4th and 5th toes on stool.     External Referral To Pain Clinic     Referral Priority:   Routine     Referral Type:   Eval and Treat     Referral Reason:   Specialty Services Required     Requested Specialty:   Pain Management     Number of Visits Requested:   1     Orders Placed This Encounter   Medications    vitamin D (ERGOCALCIFEROL) 1.25 MG (79762 UT) CAPS capsule     Sig: TAKE ONE CAPSULE BY MOUTH TWICE WEEKLY ON TUESDAY AND FRIDAY

## 2020-06-22 ASSESSMENT — ENCOUNTER SYMPTOMS
ABDOMINAL PAIN: 0
DIARRHEA: 0
VOMITING: 0
EYE PAIN: 0
COUGH: 0
RHINORRHEA: 0
CONSTIPATION: 0
NAUSEA: 0
EYE DISCHARGE: 0
BACK PAIN: 1
SHORTNESS OF BREATH: 0

## 2020-06-22 NOTE — PATIENT INSTRUCTIONS
Patient Education        Diabetes and Preventing Falls: Care Instructions  Your Care Instructions     If you are an older adult who has diabetes, you may have a higher risk of falling. Complications of diabetes--such as nerve damage, foot problems, and reduced vision--may increase your risk of a fall. Some of your medicines also may add to your risk. By making your home safer, you can lower your risk of falling. Doing things to prevent diabetes complications may also help to lower your risk. You can make your home safer with a few simple measures. Follow-up care is a key part of your treatment and safety. Be sure to make and go to all appointments, and call your doctor if you are having problems. It's also a good idea to know your test results and keep a list of the medicines you take. How can you care for yourself at home? Taking care of yourself  · Keep your blood sugar at a target level (which you set with your doctor). · Exercise regularly to improve your strength, muscle tone, and balance. Walk if you can. Swimming may be a good choice if you cannot walk easily. · Have your vision checked as often as your doctor recommends. It is usually once a year or more often if you have eye problems. · Know the side effects of the medicines you take. Ask your doctor or pharmacist whether the medicines you take can affect your balance. Sleeping pills or sedatives can affect your balance. · Limit the amount of alcohol you drink. Alcohol can impair your balance and other senses. · Have your doctor check your feet during each visit. If you have a foot problem, see your doctor. Preventing falls at home  · Remove raised doorway thresholds, throw rugs, and clutter. Repair loose carpet or raised areas in the floor. · Move furniture and electrical cords to keep them out of walking paths. · Use nonskid floor wax, and wipe up spills right away, especially on ceramic tile floors.   · If you use a walker or cane, put

## 2020-07-14 ENCOUNTER — VIRTUAL VISIT (OUTPATIENT)
Dept: FAMILY MEDICINE CLINIC | Age: 60
End: 2020-07-14
Payer: MEDICARE

## 2020-07-14 VITALS
DIASTOLIC BLOOD PRESSURE: 70 MMHG | WEIGHT: 217 LBS | HEIGHT: 65 IN | BODY MASS INDEX: 36.15 KG/M2 | SYSTOLIC BLOOD PRESSURE: 136 MMHG

## 2020-07-14 PROCEDURE — G0439 PPPS, SUBSEQ VISIT: HCPCS | Performed by: FAMILY MEDICINE

## 2020-07-14 ASSESSMENT — LIFESTYLE VARIABLES: HOW OFTEN DO YOU HAVE A DRINK CONTAINING ALCOHOL: 0

## 2020-07-14 ASSESSMENT — PATIENT HEALTH QUESTIONNAIRE - PHQ9
SUM OF ALL RESPONSES TO PHQ QUESTIONS 1-9: 2
SUM OF ALL RESPONSES TO PHQ QUESTIONS 1-9: 2

## 2020-07-14 NOTE — PATIENT INSTRUCTIONS
Personalized Preventive Plan for Lay Burns - 7/14/2020  Medicare offers a range of preventive health benefits. Some of the tests and screenings are paid in full while other may be subject to a deductible, co-insurance, and/or copay. Some of these benefits include a comprehensive review of your medical history including lifestyle, illnesses that may run in your family, and various assessments and screenings as appropriate. After reviewing your medical record and screening and assessments performed today your provider may have ordered immunizations, labs, imaging, and/or referrals for you. A list of these orders (if applicable) as well as your Preventive Care list are included within your After Visit Summary for your review. Other Preventive Recommendations:    · A preventive eye exam performed by an eye specialist is recommended every 1-2 years to screen for glaucoma; cataracts, macular degeneration, and other eye disorders. · A preventive dental visit is recommended every 6 months. · Try to get at least 150 minutes of exercise per week or 10,000 steps per day on a pedometer . · Order or download the FREE \"Exercise & Physical Activity: Your Everyday Guide\" from [employer]. Call 3-181.428.6823 or search [employer] online. · You need 5381-7911 mg of calcium and 8195-0747 IU of vitamin D per day. It is possible to meet your calcium requirement with diet alone, but a vitamin D supplement is usually necessary to meet this goal.  · When exposed to the sun, use a sunscreen that protects against both UVA and UVB radiation with an SPF of 30 or greater. Reapply every 2 to 3 hours or after sweating, drying off with a towel, or swimming. · Always wear a seat belt when traveling in a car. Always wear a helmet when riding a bicycle or motorcycle.

## 2020-07-14 NOTE — PROGRESS NOTES
Medicare Annual Wellness Visit  Name: Ralph Camacho Date: 2020   MRN: [id number] Sex: Female   Age: 64 y.o. Ethnicity: Non-/Non    : 1960 Race: Eliza Gomez is here for Medicare AWV    Screenings for behavioral, psychosocial and functional/safety risks, and cognitive dysfunction are all negative except as indicated below. These results, as well as other patient data from the [address] form, are documented in Flowsheets linked to this Encounter. Allergies   Allergen Reactions    Levaquin [Levofloxacin In D5w] Anaphylaxis    Noroxin [Norfloxacin] Shortness Of Breath    Prozac [Fluoxetine Hcl]        Prior to Visit Medications    Medication Sig Taking? Authorizing Provider   vitamin D (ERGOCALCIFEROL) 1.25 MG (42490 UT) CAPS capsule TAKE ONE CAPSULE BY MOUTH TWICE WEEKLY ON TUESDAY AND FRIDAY Yes Chad Romero MD   Omega-3 Fatty Acids (FISH OIL) 1000 MG CAPS Take 1 capsule by mouth 2 times daily Yes Chad Romero MD   nitroGLYCERIN (NITROSTAT) 0.4 MG SL tablet Place 1 tablet under the tongue every 5 minutes as needed for Chest pain up to max of 3 total doses. If no relief after 1 dose, call 911. Yes Chad Romero MD   Insulin Degludec-Liraglutide (XULTOPHY) 100-3.6 UNIT-MG/ML SOPN Inject 26 units daily. Adjust dose up or down by 2 units every 4 days until AM fasting sugar is . Max dose 50 units. Replaces lantus. Yes Chad Romero MD   furosemide (LASIX) 20 MG tablet Take 1 tablet by mouth daily as needed (swelling) Yes Chad Romero MD   naproxen sodium (ANAPROX) 550 MG tablet Take 1 tablet by mouth 2 times daily (with meals) Yes Chad Romero MD   glipiZIDE (GLUCOTROL) 5 MG tablet TAKE ONE TABLET BY MOUTH TWICE A DAY BEFORE MEALS Yes Chad Romero MD   venlafaxine (EFFEXOR XR) 150 MG extended release capsule TAKE ONE CAPSULE BY MOUTH DAILY Yes Chad Romero MD   silver sulfADIAZINE (SILVADENE) 1 % cream Apply topically daily.  Yes Chad Romero MD   Insulin Pen Needle 32G X 4 MM MISC 1 each by Does not apply route daily Yes Cathy Mcclellan MD   metoprolol succinate (TOPROL XL) 25 MG extended release tablet Take 1 tablet by mouth daily Yes Cathy Mcclellan MD   omeprazole (PRILOSEC) 40 MG delayed release capsule Take 1 capsule by mouth daily Yes Cathy Mcclellan MD   simvastatin (ZOCOR) 40 MG tablet TAKE ONE TABLET BY MOUTH EVERY DAY Yes Cathy Mcclellan MD   VENTOLIN  (90 Base) MCG/ACT inhaler Inhale 1 puff into the lungs 4 times daily Yes Cathy Mcclellan MD   glucose monitoring kit (FREESTYLE) monitoring kit 1 kit by Does not apply route 3 times daily Yes Cathy Mcclellan MD   loratadine (CLARITIN) 10 MG tablet Take 1 tablet by mouth daily Yes Cathy Mcclellan MD   cloNIDine (CATAPRES) 0.1 MG tablet Take 1 tablet by mouth 2 times daily Yes Cathy Mcclellan MD   diphenhydrAMINE (BENADRYL) 25 MG capsule Take 25 mg by mouth every 6 hours as needed Yes Historical Provider, MD   OXYGEN Inhale into the lungs every 8 hours Yes Historical Provider, MD   blood glucose test strips (BECK CONTOUR NEXT TEST) strip 1 each by In Vitro route daily As needed.   MAGDALENA Baxter       Past Medical History:   Diagnosis Date    Anxiety     Arthritis     Bilateral low back pain with sciatica 12/21/2015    Bladder incontinence     CAD (coronary artery disease)     CAD (coronary artery disease)     Cervical cancer ([facility] [address]     H/O    COPD (chronic obstructive pulmonary disease) (HCC)     Depression     Diabetes mellitus (HCC)     GERD (gastroesophageal reflux disease)     History of blood transfusion     Hyperlipidemia     Hypertension     Hypoxia        Past Surgical History:   Procedure Laterality Date   Johny Sanz CARDIAC SURGERY      october 8th 2009    CHOLECYSTECTOMY, LAPAROSCOPIC      CORONARY ARTERY BYPASS GRAFT      8/8 triple       Family History   Problem Relation Age of Onset    Cancer Mother     Kidney Disease Father         renal failure       CareTeam (Including outside New or Increased Fatigue, Loneliness, Social Isolation, Stress or Anger?: None of These  Do you get the social and emotional support that you need?: Yes  Do you have a Living Will?: (!) No  General Health Risk Interventions:  · No Living Will: provided the state-specific advance directive document to the patient    Health Habits/Nutrition:  Health Habits/Nutrition  Do you exercise for at least 20 minutes 2-3 times per week?: (!) No  Have you lost any weight without trying in the past 3 months?: No  Do you eat fewer than 2 meals per day?: No  Have you seen a dentist within the past year?: (!) No(Has upper and lower dentures.)  Body mass index is 36.11 kg/m². Health Habits/Nutrition Interventions:  · Inadequate physical activity:  educational materials provided to promote increased physical activity    Hearing/Vision:  No exam data present  Hearing/Vision  Do you or your family notice any trouble with your hearing?: (!) Yes  Do you have difficulty driving, watching TV, or doing any of your daily activities because of your eyesight?: No  Have you had an eye exam within the past year?: (!) No  Hearing/Vision Interventions:  · Hearing concerns:  patient declines any further evaluation/treatment for hearing issues  · Vision concerns:  patient encouraged to make appointment with his/her eye specialist    ADL:  ADLs  In the past 7 days, did you need help from others to perform any of the following everyday activities? Eating, dressing, grooming, bathing, toileting, or walking/balance?: None  In the past 7 days, did you need help from others to take care of any of the following?  Laundry, housekeeping, banking/finances, shopping, telephone use, food preparation, transportation, or taking medications?: (!) None, Housekeeping, Laundry  ADL Interventions:  · Patient declines any further evaluation/treatment for this issue    Personalized Preventive Plan   Current Health Maintenance Status  Immunization History   Administered a Patient Initiated Episode with a Patient who has not had a related appointment within my department in the past 7 days or scheduled within the next 24 hours.     Patient identification was verified at the start of the visit: Yes    Total Time: minutes: 21-30 minutes    Note: not billable if this call serves to triage the patient into an appointment for the relevant concern      Delfina Rivera

## 2020-09-17 ENCOUNTER — OFFICE VISIT (OUTPATIENT)
Dept: FAMILY MEDICINE CLINIC | Age: 60
End: 2020-09-17
Payer: MEDICARE

## 2020-09-17 VITALS
BODY MASS INDEX: 37.11 KG/M2 | OXYGEN SATURATION: 93 % | TEMPERATURE: 98.1 F | RESPIRATION RATE: 18 BRPM | WEIGHT: 223 LBS

## 2020-09-17 PROCEDURE — 99214 OFFICE O/P EST MOD 30 MIN: CPT | Performed by: FAMILY MEDICINE

## 2020-09-17 RX ORDER — ERGOCALCIFEROL 1.25 MG/1
CAPSULE ORAL
Qty: 8 CAPSULE | Refills: 3 | Status: SHIPPED | OUTPATIENT
Start: 2020-09-17 | End: 2022-08-23

## 2020-09-17 RX ORDER — PREDNISONE 20 MG/1
40 TABLET ORAL DAILY
Qty: 10 TABLET | Refills: 0 | Status: SHIPPED | OUTPATIENT
Start: 2020-09-17 | End: 2020-09-22

## 2020-09-17 RX ORDER — AZITHROMYCIN 250 MG/1
250 TABLET, FILM COATED ORAL SEE ADMIN INSTRUCTIONS
Qty: 6 TABLET | Refills: 0 | Status: SHIPPED | OUTPATIENT
Start: 2020-09-17 | End: 2020-09-22

## 2020-09-17 NOTE — PROGRESS NOTES
Geovany Spain is a 64 y.o. female who presents today for   Chief Complaint   Patient presents with    Cough     Patient would like her at home oxygen back. She lives closest to [address] [place] [address]. She used to used 2L    Shortness of Breath    Headache    Wheezing     x1 1/2 months       Chief Complaint: Cough    HPI  Patient has history of COPD and chronic shortness of breath was previously on oxygen but had improved but she was satting at 88% while ambulating and improved when she was able to sit. She does report that she has been having shortness of breath and wheezing that is been ongoing for quite some time now but denies any other associated symptoms. She states that she is continuing with her breathing medications but continues have shortness of breath and denies any specific aggravating or relieving factors for symptoms other than that her breathing gets worse with activity and improved some with rest.  She denies any fever or other sick symptoms. She denies any known sick contacts. Review of Systems   Constitutional: Negative for activity change, appetite change and fever. HENT: Negative for congestion, rhinorrhea and sore throat. Eyes: Negative for pain and discharge. Respiratory: Positive for cough, shortness of breath and wheezing. Cardiovascular: Negative for chest pain and palpitations. Gastrointestinal: Negative for abdominal pain, constipation, diarrhea, nausea and vomiting. Endocrine: Negative for cold intolerance and heat intolerance. Genitourinary: Negative for dysuria and hematuria. Musculoskeletal: Negative for gait problem and neck pain. Skin: Negative for rash and wound. Neurological: Negative for syncope and weakness. Hematological: Negative for adenopathy. Does not bruise/bleed easily. Psychiatric/Behavioral: Negative for dysphoric mood and sleep disturbance.        Past Medical History:   Diagnosis Date    Anxiety     Arthritis     Bilateral low back pain with sciatica 12/21/2015    Bladder incontinence     CAD (coronary artery disease)     CAD (coronary artery disease)     Cervical cancer (HCC)     H/O    COPD (chronic obstructive pulmonary disease) (HCC)     Depression     Diabetes mellitus (HCC)     GERD (gastroesophageal reflux disease)     History of blood transfusion     Hyperlipidemia     Hypertension     Hypoxia        Current Outpatient Medications   Medication Sig Dispense Refill    vitamin D (ERGOCALCIFEROL) 1.25 MG (41673 UT) CAPS capsule TAKE ONE CAPSULE BY MOUTH TWICE WEEKLY ON TUESDAY AND FRIDAY 8 capsule 3    fluticasone-salmeterol (ADVAIR DISKUS) 250-50 MCG/DOSE AEPB Inhale 1 puff into the lungs every 12 hours 60 each 3    Omega-3 Fatty Acids (FISH OIL) 1000 MG CAPS Take 1 capsule by mouth 2 times daily 180 capsule 2    nitroGLYCERIN (NITROSTAT) 0.4 MG SL tablet Place 1 tablet under the tongue every 5 minutes as needed for Chest pain up to max of 3 total doses. If no relief after 1 dose, call 911. 25 tablet 1    Insulin Degludec-Liraglutide (XULTOPHY) 100-3.6 UNIT-MG/ML SOPN Inject 26 units daily. Adjust dose up or down by 2 units every 4 days until AM fasting sugar is . Max dose 50 units. Replaces lantus. 5 pen 5    furosemide (LASIX) 20 MG tablet Take 1 tablet by mouth daily as needed (swelling) 30 tablet 2    naproxen sodium (ANAPROX) 550 MG tablet Take 1 tablet by mouth 2 times daily (with meals) 60 tablet 3    glipiZIDE (GLUCOTROL) 5 MG tablet TAKE ONE TABLET BY MOUTH TWICE A DAY BEFORE MEALS 180 tablet 1    venlafaxine (EFFEXOR XR) 150 MG extended release capsule TAKE ONE CAPSULE BY MOUTH DAILY 90 capsule 1    silver sulfADIAZINE (SILVADENE) 1 % cream Apply topically daily.  50 g 0    Insulin Pen Needle 32G X 4 MM MISC 1 each by Does not apply route daily 100 each 0    metoprolol succinate (TOPROL XL) 25 MG extended release tablet Take 1 tablet by mouth daily 90 tablet 3    omeprazole (PRILOSEC) 40 MG delayed No scleral icterus. Right eye: No discharge. Left eye: No discharge. Conjunctiva/sclera: Conjunctivae normal.      Pupils: Pupils are equal, round, and reactive to light. Neck:      Musculoskeletal: Neck supple. Trachea: No tracheal deviation. Cardiovascular:      Rate and Rhythm: Normal rate and regular rhythm. Heart sounds: Normal heart sounds. No murmur. No friction rub. No gallop. Pulmonary:      Effort: Pulmonary effort is normal. No respiratory distress. Breath sounds: Wheezing present. No rales. Comments: Upper airway coarse breath sounds. Abdominal:      General: Bowel sounds are normal. There is no distension. Palpations: Abdomen is soft. Tenderness: There is no abdominal tenderness. Musculoskeletal:         General: No deformity ( no gross deformities of upper or lower extremities bilaterally). Right lower leg: No edema. Left lower leg: No edema. Lymphadenopathy:      Cervical: No cervical adenopathy. Skin:     General: Skin is warm and dry. Findings: No erythema or rash. Neurological:      Mental Status: She is alert and oriented to person, place, and time. Cranial Nerves: No cranial nerve deficit. Motor: No abnormal muscle tone. Psychiatric:         Behavior: Behavior normal.         Thought Content: Thought content normal.         Assessment:       ICD-10-CM    1. COPD with acute exacerbation ([facility])  J44.1 azithromycin (ZITHROMAX) 250 MG tablet     predniSONE (DELTASONE) 20 MG tablet     fluticasone-salmeterol (ADVAIR DISKUS) 250-50 MCG/DOSE AEPB   2. Acute bronchitis, unspecified organism  J20.9 azithromycin (ZITHROMAX) 250 MG tablet   3. Hypoxia  R09.02 DME Order for Home Oxygen as OP   4.  Vitamin D deficiency  E55.9 vitamin D (ERGOCALCIFEROL) 1.25 MG (66022 UT) CAPS capsule       Plan:  Discussed diagnosis, expected course, and proper use of medication, including already available home medications and OTC medications. Discussed signs and symptoms requiring medical attention. All questions were answered and patient voiced understanding and agreement with plan as discussed. Orders Placed This Encounter   Procedures    DME Order for Home Oxygen as OP     You must complete the order parameters below and add the medical necessity documentation for this DME in a separate note. Stationary Oxygen Concentrator at 2 lpm via Nasal Cannula    Stationary Prescribed at:  Continuous    Portable Gaseous O2 System and contents at 2 lpm via Nasal Cannula    Non Applicable    Diagnosis: hypoxia  Length of need: Lifetime     Orders Placed This Encounter   Medications    vitamin D (ERGOCALCIFEROL) 1.25 MG (96623 UT) CAPS capsule     Sig: TAKE ONE CAPSULE BY MOUTH TWICE WEEKLY ON TUESDAY AND FRIDAY     Dispense:  8 capsule     Refill:  3     This prescription was filled on 12/27/2018. Any refills authorized will be placed on file.  azithromycin (ZITHROMAX) 250 MG tablet     Sig: Take 1 tablet by mouth See Admin Instructions for 5 days 500mg on day 1 followed by 250mg on days 2 - 5     Dispense:  6 tablet     Refill:  0    predniSONE (DELTASONE) 20 MG tablet     Sig: Take 2 tablets by mouth daily for 5 days     Dispense:  10 tablet     Refill:  0    fluticasone-salmeterol (ADVAIR DISKUS) 250-50 MCG/DOSE AEPB     Sig: Inhale 1 puff into the lungs every 12 hours     Dispense:  60 each     Refill:  3     Medications Discontinued During This Encounter   Medication Reason    blood glucose test strips (BECK CONTOUR NEXT TEST) strip LIST CLEANUP    diphenhydrAMINE (BENADRYL) 25 MG capsule LIST CLEANUP    OXYGEN Availability    vitamin D (ERGOCALCIFEROL) 1.25 MG (49913 UT) CAPS capsule REORDER     Patient Instructions   Patient given educational handouts and has had all questions answered. Patient voices understanding and agrees to plans along with risks and benefits of plan.  Patient is instructed to continue prior meds,diet, and exercise plans as instructed. Patient agrees to follow up as instructed and sooner if needed. Patient agrees to go to ER if condition becomes emergent. Return if symptoms worsen or fail to improve, for next scheduled follow up with PCP.

## 2020-09-17 NOTE — PROGRESS NOTES
Walking O2 - 88% on room air    At rest on room air 93% after five minutes recovery. Unable to do walking with O2 due to symptoms at time of visit.  Patient will be advised to come back for walking with O2 when she is able to come during the \"green clinic\"

## 2020-10-13 ENCOUNTER — OFFICE VISIT (OUTPATIENT)
Dept: INTERNAL MEDICINE | Facility: CLINIC | Age: 60
End: 2020-10-13

## 2020-10-13 VITALS
WEIGHT: 223.6 LBS | BODY MASS INDEX: 37.25 KG/M2 | HEIGHT: 65 IN | TEMPERATURE: 98.7 F | HEART RATE: 99 BPM | DIASTOLIC BLOOD PRESSURE: 83 MMHG | SYSTOLIC BLOOD PRESSURE: 171 MMHG | OXYGEN SATURATION: 91 %

## 2020-10-13 DIAGNOSIS — Z12.11 SCREENING FOR COLON CANCER: ICD-10-CM

## 2020-10-13 DIAGNOSIS — E78.00 HYPERCHOLESTEREMIA: ICD-10-CM

## 2020-10-13 DIAGNOSIS — Z79.4 TYPE 2 DIABETES MELLITUS WITH DIABETIC NEUROPATHY, WITH LONG-TERM CURRENT USE OF INSULIN (HCC): ICD-10-CM

## 2020-10-13 DIAGNOSIS — R09.02 HYPOXIA: Primary | ICD-10-CM

## 2020-10-13 DIAGNOSIS — E11.40 TYPE 2 DIABETES MELLITUS WITH DIABETIC NEUROPATHY, WITH LONG-TERM CURRENT USE OF INSULIN (HCC): ICD-10-CM

## 2020-10-13 DIAGNOSIS — I10 ESSENTIAL HYPERTENSION: ICD-10-CM

## 2020-10-13 DIAGNOSIS — Z12.31 SCREENING MAMMOGRAM, ENCOUNTER FOR: ICD-10-CM

## 2020-10-13 PROCEDURE — 99203 OFFICE O/P NEW LOW 30 MIN: CPT | Performed by: INTERNAL MEDICINE

## 2020-10-13 RX ORDER — VENLAFAXINE HYDROCHLORIDE 150 MG/1
150 CAPSULE, EXTENDED RELEASE ORAL DAILY
COMMUNITY
Start: 2020-08-28 | End: 2021-07-20

## 2020-10-13 RX ORDER — ALBUTEROL SULFATE 90 UG/1
90 AEROSOL, METERED RESPIRATORY (INHALATION) 2 TIMES DAILY
COMMUNITY
Start: 2020-08-28 | End: 2021-10-21

## 2020-10-13 RX ORDER — NAPROXEN SODIUM 550 MG/1
550 TABLET ORAL 2 TIMES DAILY PRN
COMMUNITY
Start: 2020-08-28 | End: 2021-04-06

## 2020-10-13 RX ORDER — CHLORAL HYDRATE 500 MG
1000 CAPSULE ORAL 2 TIMES DAILY WITH MEALS
COMMUNITY
Start: 2020-06-18

## 2020-10-13 RX ORDER — GABAPENTIN 300 MG/1
300 CAPSULE ORAL 3 TIMES DAILY
Qty: 90 CAPSULE | Refills: 0 | Status: SHIPPED | OUTPATIENT
Start: 2020-10-13 | End: 2020-12-22 | Stop reason: SDUPTHER

## 2020-10-13 RX ORDER — NITROGLYCERIN 0.4 MG/1
0.4 TABLET SUBLINGUAL DAILY PRN
COMMUNITY
Start: 2020-09-28 | End: 2021-07-26 | Stop reason: SDUPTHER

## 2020-10-13 RX ORDER — LORATADINE 10 MG/1
10 TABLET ORAL DAILY PRN
COMMUNITY
Start: 2020-08-28 | End: 2021-10-21 | Stop reason: SDUPTHER

## 2020-10-13 RX ORDER — FUROSEMIDE 20 MG/1
20 TABLET ORAL DAILY
Status: ON HOLD | COMMUNITY
Start: 2020-08-28 | End: 2021-11-03

## 2020-10-13 RX ORDER — (INSULIN DEGLUDEC AND LIRAGLUTIDE) 100; 3.6 [IU]/ML; MG/ML
30 INJECTION, SOLUTION SUBCUTANEOUS NIGHTLY
COMMUNITY
Start: 2020-08-28 | End: 2021-12-20

## 2020-10-13 RX ORDER — ERGOCALCIFEROL 1.25 MG/1
50000 CAPSULE ORAL WEEKLY
COMMUNITY
Start: 2020-09-17 | End: 2021-07-26 | Stop reason: SDUPTHER

## 2020-10-13 RX ORDER — IPRATROPIUM BROMIDE AND ALBUTEROL SULFATE 2.5; .5 MG/3ML; MG/3ML
3 SOLUTION RESPIRATORY (INHALATION) ONCE
Status: COMPLETED | OUTPATIENT
Start: 2020-10-13 | End: 2020-10-13

## 2020-10-13 RX ORDER — OMEPRAZOLE 40 MG/1
40 CAPSULE, DELAYED RELEASE ORAL DAILY
COMMUNITY
Start: 2020-09-28 | End: 2021-04-06 | Stop reason: SDUPTHER

## 2020-10-13 RX ORDER — GLIPIZIDE 5 MG/1
5 TABLET ORAL 2 TIMES DAILY
COMMUNITY
Start: 2020-08-28 | End: 2020-11-05 | Stop reason: SDUPTHER

## 2020-10-13 RX ORDER — LISINOPRIL 10 MG/1
10 TABLET ORAL DAILY
Qty: 30 TABLET | Refills: 3 | Status: SHIPPED | OUTPATIENT
Start: 2020-10-13 | End: 2021-10-19

## 2020-10-13 RX ORDER — IPRATROPIUM BROMIDE AND ALBUTEROL SULFATE 2.5; .5 MG/3ML; MG/3ML
3 SOLUTION RESPIRATORY (INHALATION) EVERY 4 HOURS PRN
Qty: 360 ML | Refills: 12 | Status: SHIPPED | OUTPATIENT
Start: 2020-10-13

## 2020-10-13 RX ADMIN — IPRATROPIUM BROMIDE AND ALBUTEROL SULFATE 3 ML: 2.5; .5 SOLUTION RESPIRATORY (INHALATION) at 14:54

## 2020-10-13 NOTE — PROGRESS NOTES
"        Subjective     Chief Complaint   Patient presents with   • Diabetes   • Sleep Apnea     suppose to be getting oxygen machine through [facility]        History of Present Illness  Here to establish care. \"Wanting something done about my feet and pain. I can't go to pain management in [place] because I cannot get a ride.\"    Has been diabetic since 2003. Started insulin this year. Dr. Daryl Nagy was following.    Anxiety: Having trouble to get her mind to stop to sleep. Was getting ativan but her previous doctor told her she needed to go to pain management    GERD- Doing ok on current medications    High cholesterol- Taking fish oil, has not changed diet, unable to exercise for breathing.  Stopped her previous cholesterol medication.     Breathing- Has smoked for over 35 years. Smokes about a pack per day at this point. Has tried to quit but not succeeded.     Pain- Lower back, hips, knees, and feet these pains having been going on for over 15 years. Describes the pain as a terrible burning pain and stabbing at times mainly when standing. Radiates into the left leg. When laying or sitting she describes the pain as a severe ache.  Recently started having pain in her left shoulder.  Was on naproxen and ibuprofen for pain. Dr. Nagy was wanting her to go to pain management. Describes the pain in her feet as cramping, it does get better with rest but takes several minutes. Agreeable to [facility] at next        Patient's PMR from outside medical facility reviewed and noted.    Review of Systems   Constitutional: Positive for activity change and fatigue. Negative for fever.   HENT: Negative for congestion, sinus pain and sore throat.    Eyes: Negative for pain and discharge.   Respiratory: Positive for cough, chest tightness, shortness of breath and wheezing.    Cardiovascular: Negative for chest pain, palpitations and leg swelling.   Gastrointestinal: Negative for blood in stool, constipation and diarrhea.   Genitourinary: " Negative for difficulty urinating and hematuria.   Musculoskeletal: Positive for arthralgias, back pain and myalgias.   Neurological: Negative for seizures and light-headedness.   Psychiatric/Behavioral: Positive for sleep disturbance. The patient is nervous/anxious.       Otherwise complete ROS reviewed and negative except as mentioned in the HPI.    Past Medical History:   Past Medical History:   Diagnosis Date   • Anxiety    • Arthritis    • Depression    • Diabetes mellitus (CMS/HCC)    • GERD (gastroesophageal reflux disease)    • Heart problem    • Hyperlipidemia    • Memory problem    • Sleep apnea    • Visual impairment      Past Surgical History:  Past Surgical History:   Procedure Laterality Date   • CHOLECYSTECTOMY     • CORONARY ARTERY BYPASS GRAFT       Social History:  reports that she has been smoking cigarettes. She has a 36.00 pack-year smoking history. She has never used smokeless tobacco. She reports previous alcohol use. She reports that she does not use drugs.    Family History: family history includes Cancer in her mother; Diabetes in her maternal grandmother; Hyperlipidemia in her sister; Hypertension in her sister.      Allergies:  Allergies   Allergen Reactions   • Fluoxetine Hcl Shortness Of Breath   • Levofloxacin Anaphylaxis   • Norfloxacin Shortness Of Breath     Medications:  Prior to Admission medications    Medication Sig Start Date End Date Taking? Authorizing Provider   fluticasone-salmeterol (ADVAIR) 250-50 MCG/DOSE DISKUS Inhale 250 puffs 2 (two) times a day. 9/17/20  Yes ProviderJustin MD   furosemide (LASIX) 20 MG tablet Take 20 mg by mouth Daily. 8/28/20  Yes ProviderJustin MD   glipizide (GLUCOTROL) 5 MG tablet Take 5 mg by mouth 2 (two) times a day. 8/28/20  Yes ProviderJustin MD   loratadine (CLARITIN) 10 MG tablet Take 10 mg by mouth Daily As Needed. 8/28/20  Yes ProviderJustin MD   naproxen sodium (ANAPROX) 550 MG tablet Take 550 mg by mouth 2  "(Two) Times a Day As Needed. 8/28/20  Yes Justin Hernandez MD   nitroglycerin (NITROSTAT) 0.4 MG SL tablet Place 0.4 mg under the tongue Daily As Needed. 9/28/20  Yes Provider, Historical, MD   Omega-3 Fatty Acids (fish oil) 1000 MG capsule capsule Take 1,000 mg by mouth 2 (two) times a day. 6/18/20  Yes Provider, Historical, MD   omeprazole (priLOSEC) 40 MG capsule Take 40 mg by mouth Daily. 9/28/20  Yes Provider, Historical, MD   venlafaxine XR (EFFEXOR-XR) 150 MG 24 hr capsule Take 150 mg by mouth Daily. 8/28/20  Yes Provider, Historical, MD   Ventolin  (90 Base) MCG/ACT inhaler Inhale 90 mcg 2 (two) times a day. 8/28/20  Yes Provider, Historical, MD   vitamin D (ERGOCALCIFEROL) 1.25 MG (56215 UT) capsule capsule Take 50,000 Units by mouth 1 (One) Time Per Week. 9/17/20  Yes Justin Hernandez MD   Xultophy 100-3.6 UNIT-MG/ML solution pen-injector subcutaneous pen Inject 30 Units as directed Every Night. 8/28/20  Yes Justin Hernandez MD       Objective     Vital Signs: /83 (BP Location: Left arm, Patient Position: Sitting, Cuff Size: Adult)   Pulse 99   Temp 98.7 °F (37.1 °C) (Infrared)   Ht 165.1 cm (65\")   Wt 101 kg (223 lb 9.6 oz)   SpO2 91%   Breastfeeding No   BMI 37.21 kg/m²   Physical Exam  Constitutional:       Appearance: She is ill-appearing.   HENT:      Head: Normocephalic and atraumatic.      Nose: Nose normal. No congestion or rhinorrhea.      Mouth/Throat:      Mouth: Mucous membranes are moist.      Pharynx: Oropharynx is clear.   Eyes:      Conjunctiva/sclera: Conjunctivae normal.      Pupils: Pupils are equal, round, and reactive to light.   Neck:      Musculoskeletal: Normal range of motion and neck supple.   Cardiovascular:      Rate and Rhythm: Normal rate and regular rhythm.   Pulmonary:      Breath sounds: Wheezing and rhonchi present.   Abdominal:      General: Bowel sounds are normal.      Palpations: Abdomen is soft.   Musculoskeletal: Normal range of " motion.   Feet:      Right foot:      Skin integrity: Erythema and warmth present.      Left foot:      Skin integrity: Erythema and warmth present.   Skin:     General: Skin is warm.      Findings: Erythema and wound present.          Neurological:      General: No focal deficit present.      Mental Status: She is alert and oriented to person, place, and time.   Psychiatric:         Mood and Affect: Mood normal.         Behavior: Behavior normal.       Patient's Body mass index is 37.21 kg/m². BMI is above normal parameters. Recommendations include: none (medical contraindication).      Results Reviewed:  Glucose   Date Value Ref Range Status   06/16/2020 352 (H) 74 - 109 mg/dL Final     BUN   Date Value Ref Range Status   06/16/2020 9 6 - 20 mg/dL Final     Creatinine   Date Value Ref Range Status   06/16/2020 0.7 0.5 - 0.9 mg/dL Final     Sodium   Date Value Ref Range Status   06/16/2020 134 (L) 136 - 145 mmol/L Final     Potassium   Date Value Ref Range Status   06/16/2020 3.4 (L) 3.5 - 5.0 mmol/L Final     Chloride   Date Value Ref Range Status   06/16/2020 92 (L) 98 - 111 mmol/L Final     CO2   Date Value Ref Range Status   06/16/2020 29 22 - 29 mmol/L Final     Calcium   Date Value Ref Range Status   06/16/2020 8.5 (L) 8.6 - 10.0 mg/dL Final     ALT (SGPT)   Date Value Ref Range Status   06/16/2020 8 5 - 33 U/L Final     AST (SGOT)   Date Value Ref Range Status   06/16/2020 10 5 - 32 U/L Final     WBC   Date Value Ref Range Status   06/16/2020 10.5 4.8 - 10.8 K/uL Final     Hematocrit   Date Value Ref Range Status   06/16/2020 39.7 37.0 - 47.0 % Final     Platelets   Date Value Ref Range Status   06/16/2020 281 130 - 400 K/uL Final     Triglycerides   Date Value Ref Range Status   06/16/2020 169 (H) 0 - 149 mg/dL Final     HDL Cholesterol   Date Value Ref Range Status   06/16/2020 51 (L) 65 - 121 mg/dL Final     Comment:     VALUES>60 MG/DL ARE ASSOCIATED WITH A DECREASED RISK OF  ATHEROSCLEROTIC  CARDIOVASCULAR DISEASE     LDL Cholesterol    Date Value Ref Range Status   06/16/2020 136 <100 mg/dL Final     Comment:     <100 MG/DL=OPITIMAL    100-129 MG/DL=DESIRABLE    130-159 MG/DL BORDERLINE=INCREASED RISK OF ATHEROSCLEROTIC  CARDIOVASCULAR DISEASE    > OR = 160 MG/DL=ASSOCIATED WITH AN INCREASE RISK OF  ATHEROSCLEROTIC CARDIOVASCULAR DISEASE     Hemoglobin A1C   Date Value Ref Range Status   06/16/2020 8.7 (H) 4.0 - 6.0 % Final     Comment:     HbA1c levels >6% are an indication of hyperglycemia during the preceding 2  to 3 months or longer.    HbA1c levels may reach 20% or higher in poorly controlled diabetes.  Therapeutic action is suggested at levels above 8%.    Diabetes patients with HbA1c levels below 7% meet the goal of the American  Diabetes Association.    HbA1c levels below the established reference range may indicate recent  episodes of hypoglycemia, the presence of Hb variants, or shortened lifetime  of erythrocytes.          Assessment / Plan     Assessment/Plan:  1. Hypoxia  - ipratropium-albuterol (DUO-NEB) 0.5-2.5 mg/3 ml nebulizer; Take 3 mL by nebulization Every 4 (Four) Hours As Needed for Wheezing.  Dispense: 360 mL; Refill: 12  - Home Nebulizer  - Home Nebulizer Accessories  - ipratropium-albuterol (DUO-NEB) nebulizer solution 3 mL  - Oxygen Therapy    2. Hypercholesteremia  - Repatha    3. Type 2 diabetes mellitus with diabetic neuropathy, with long-term current use of insulin (CMS/[facility])  - gabapentin (NEURONTIN) 300 MG capsule; Take 1 capsule by mouth 3 (Three) Times a Day.  Dispense: 90 capsule; Refill: 0  - POC Glycosylated Hemoglobin (Hb A1C)    4. Essential hypertension  - lisinopril (PRINIVIL,ZESTRIL) 10 MG tablet; Take 1 tablet by mouth Daily.  Dispense: 30 tablet; Refill: 3  - MicroAlbumin, Urine, Random - Urine, Clean Catch; Future    5. Screening mammogram, encounter for  - Mammo Screening Bilateral With CAD; Future    6. Screening for colon cancer  - Ambulatory Referral to  General Surgery        Return in about 2 weeks (around 10/27/2020) for Recheck, Next scheduled follow up. unless patient needs to be seen sooner or acute issues arise.    Code Status: Full    I have discussed the patient results/orders and and plan/recommendation with them at today's visit.      Alejandra Chaudhari, DO   10/13/2020

## 2020-10-15 ASSESSMENT — ENCOUNTER SYMPTOMS
ABDOMINAL PAIN: 0
CONSTIPATION: 0
SHORTNESS OF BREATH: 1
NAUSEA: 0
WHEEZING: 1
VOMITING: 0
SORE THROAT: 0
RHINORRHEA: 0
DIARRHEA: 0
EYE DISCHARGE: 0
EYE PAIN: 0
COUGH: 1

## 2020-10-16 NOTE — PATIENT INSTRUCTIONS
Patient Education        Chronic Obstructive Pulmonary Disease (COPD): Care Instructions  Your Care Instructions     Chronic obstructive pulmonary disease (COPD) is a general term for a group of lung diseases, including emphysema and chronic bronchitis. People with COPD have decreased airflow in and out of the lungs, which makes it hard to breathe. The airways also can get clogged with thick mucus. Cigarette smoking is a major cause of COPD. Although there is no cure for COPD, you can slow its progress. Following your treatment plan and taking care of yourself can help you feel better and live longer. Follow-up care is a key part of your treatment and safety. Be sure to make and go to all appointments, and call your doctor if you are having problems. It's also a good idea to know your test results and keep a list of the medicines you take. How can you care for yourself at home? Staying healthy    · Do not smoke. This is the most important step you can take to prevent more damage to your lungs. If you need help quitting, talk to your doctor about stop-smoking programs and medicines. These can increase your chances of quitting for good.     · Avoid colds and flu. Get a pneumococcal vaccine shot. If you have had one before, ask your doctor whether you need a second dose. Get the flu vaccine every fall. If you must be around people with colds or the flu, wash your hands often.     · Avoid secondhand smoke, air pollution, and high altitudes. Also avoid cold, dry air and hot, humid air. Stay at home with your windows closed when air pollution is bad. Medicines and oxygen therapy    · Take your medicines exactly as prescribed. Call your doctor if you think you are having a problem with your medicine. You may be taking medicines such as:  ? Bronchodilators. These help open your airways and make breathing easier. They are either short-acting (work for 6 to 9 hours) or long-acting (work for 24 hours).  You inhale most bronchodilators, so they start to act quickly. Always carry your quick-relief inhaler with you in case you need it while you are away from home. ? Corticosteroids (prednisone, budesonide). These reduce airway inflammation. They come in pill or inhaled form. You must take these medicines every day for them to work well.     · Ask your doctor or pharmacist if a spacer is right for you. A spacer may help you get more inhaled medicine to your lungs. If you use one, ask how to use it properly.     · Do not take any vitamins, over-the-counter medicine, or herbal products without talking to your doctor first.     · If your doctor prescribed antibiotics, take them as directed. Do not stop taking them just because you feel better. You need to take the full course of antibiotics.     · If you use oxygen therapy, use the flow rate your doctor has recommended. Don't change it without talking to your doctor first. Oxygen therapy boosts the amount of oxygen in your blood and helps you breathe easier. Activity    · Get regular exercise. Walking is an easy way to get exercise. Start out slowly, and walk a little more each day.     · Pay attention to your breathing. You are exercising too hard if you can't talk while you exercise.     · Take short rest breaks when doing household chores and other activities.     · Learn breathing methods--such as breathing through pursed lips--to help you become less short of breath.     · If your doctor has not set you up with a pulmonary rehabilitation program, ask if rehab is right for you. Rehab includes exercise programs, education about your disease and how to manage it, help with diet and other changes, and emotional support. Diet    · Eat regular, healthy meals. Use bronchodilators about 1 hour before you eat to make it easier to eat. Eat several small meals instead of three large ones.  Drink beverages at the end of the meal. Avoid foods that are hard to chew.     · Eat foods that contain protein so you don't lose muscle mass.     · Talk with your doctor if you gain too much weight or if you lose weight without trying. Mental health    · Talk to your family, friends, or a therapist about your feelings. Some people feel frightened, angry, hopeless, helpless, and even guilty. Talking openly about bad feelings can help you cope. If these feelings last, talk to your doctor. When should you call for help? Call 911 anytime you think you may need emergency care. For example, call if:    · You have severe trouble breathing. Call your doctor now or seek immediate medical care if:    · You have new or worse trouble breathing.     · You cough up blood.     · You have a fever. Watch closely for changes in your health, and be sure to contact your doctor if:    · You cough more deeply or more often, especially if you notice more mucus or a change in the color of your mucus.     · You have new or worse swelling in your legs or belly.     · You are not getting better as expected. Where can you learn more? Go to https://[employer]. org and sign in to your [employer] account. Enter W213 in the [employer] box to learn more about \"Chronic Obstructive Pulmonary Disease (COPD): Care Instructions. \"     If you do not have an account, please click on the \"Sign Up Now\" link. Current as of: February 24, 2020               Content Version: 12.6  © 7610-5208 [employer]. Care instructions adapted under license by [facility] [employer] [facility] ([facility]). If you have questions about a medical condition or this instruction, always ask your healthcare professional. [address] any warranty or liability for your use of this information.

## 2020-10-27 ENCOUNTER — OFFICE VISIT (OUTPATIENT)
Dept: INTERNAL MEDICINE | Facility: CLINIC | Age: 60
End: 2020-10-27

## 2020-10-27 VITALS
HEART RATE: 98 BPM | TEMPERATURE: 97.1 F | DIASTOLIC BLOOD PRESSURE: 84 MMHG | HEIGHT: 65 IN | WEIGHT: 226 LBS | BODY MASS INDEX: 37.65 KG/M2 | SYSTOLIC BLOOD PRESSURE: 180 MMHG | OXYGEN SATURATION: 96 %

## 2020-10-27 DIAGNOSIS — E87.6 HYPOKALEMIA: Primary | ICD-10-CM

## 2020-10-27 DIAGNOSIS — F41.9 ANXIETY: ICD-10-CM

## 2020-10-27 DIAGNOSIS — E11.40 TYPE 2 DIABETES MELLITUS WITH DIABETIC NEUROPATHY, WITH LONG-TERM CURRENT USE OF INSULIN (HCC): ICD-10-CM

## 2020-10-27 DIAGNOSIS — G89.29 CHRONIC LEFT-SIDED LOW BACK PAIN WITH LEFT-SIDED SCIATICA: ICD-10-CM

## 2020-10-27 DIAGNOSIS — M54.42 CHRONIC LEFT-SIDED LOW BACK PAIN WITH LEFT-SIDED SCIATICA: ICD-10-CM

## 2020-10-27 DIAGNOSIS — Z79.4 TYPE 2 DIABETES MELLITUS WITH DIABETIC NEUROPATHY, WITH LONG-TERM CURRENT USE OF INSULIN (HCC): ICD-10-CM

## 2020-10-27 PROCEDURE — 99214 OFFICE O/P EST MOD 30 MIN: CPT | Performed by: INTERNAL MEDICINE

## 2020-10-27 RX ORDER — POTASSIUM CHLORIDE 750 MG/1
10 TABLET, FILM COATED, EXTENDED RELEASE ORAL DAILY
Qty: 30 TABLET | Refills: 1 | Status: SHIPPED | OUTPATIENT
Start: 2020-10-27 | End: 2021-05-03 | Stop reason: SDUPTHER

## 2020-10-27 RX ORDER — HYDROCODONE BITARTRATE AND ACETAMINOPHEN 10; 325 MG/1; MG/1
1 TABLET ORAL EVERY 6 HOURS PRN
Qty: 60 TABLET | Refills: 0 | Status: SHIPPED | OUTPATIENT
Start: 2020-10-27 | End: 2020-11-19 | Stop reason: SDUPTHER

## 2020-10-27 RX ORDER — LORAZEPAM 0.5 MG/1
0.25 TABLET ORAL EVERY 8 HOURS PRN
Qty: 15 TABLET | Refills: 0 | Status: SHIPPED | OUTPATIENT
Start: 2020-10-27 | End: 2020-11-19 | Stop reason: SDUPTHER

## 2020-10-27 RX ORDER — CALCIUM CARBONATE 160(400)MG
1 TABLET,CHEWABLE ORAL DAILY
Qty: 1 EACH | Refills: 0 | Status: ON HOLD | OUTPATIENT
Start: 2020-10-27 | End: 2021-11-03

## 2020-10-27 NOTE — PROGRESS NOTES
"        Subjective     Chief Complaint   Patient presents with   • Hypoxia     doing somewhat better then last time       History of Present Illness  PAtient is wearing her 02 today. Has bought a pulse OX.  States that her 02 is doing good, had one episode of 88, but other times has been 94-95.     Did not fall asleep until 6 this am.   Patient has a cane this am.   Wants to start walking but doesn't feel like she can go very far. Wants a walker with wheels to help with falls and if she needs to sit down. Trying to build up her stamina.     Patient has nebulizer. Using it at home without difficulty.     Reviewed A1C and it has improved.   Stopped drinking Cokes.     Patient asks for a refill on ativan. States that she uses it for anxiety and rest. Hasn't had any for about 1.5 years. States that she gets shaky and feels like she can't breathe good. Says ,\"its not pretty.\" has to call denise fu to try and calm down. Biggest concern is her living arrangements.    Patient's PMR from outside medical facility reviewed and noted.    Review of Systems   Constitutional: Negative for chills and fever.   HENT: Negative for congestion and nosebleeds.    Respiratory: Negative for cough and shortness of breath.         SOA improved with oxygen   Cardiovascular: Negative for chest pain and leg swelling.   Gastrointestinal: Negative for diarrhea, nausea and vomiting.   Genitourinary: Negative for dysuria and hematuria.   Musculoskeletal: Positive for back pain and gait problem.        Leg cramps   Skin: Negative for color change and wound.   Psychiatric/Behavioral: Positive for sleep disturbance. Negative for dysphoric mood.        Otherwise complete ROS reviewed and negative except as mentioned in the HPI.    Past Medical History:   Past Medical History:   Diagnosis Date   • Anxiety    • Arthritis    • Depression    • Diabetes mellitus (CMS/[facility])    • GERD (gastroesophageal reflux disease)    • Heart problem    • Hyperlipidemia    • " Memory problem    • Sleep apnea    • Visual impairment      Past Surgical History:  Past Surgical History:   Procedure Laterality Date   • CHOLECYSTECTOMY     • CORONARY ARTERY BYPASS GRAFT       Social History:  reports that she has been smoking cigarettes. She has a 36.00 pack-year smoking history. She has never used smokeless tobacco. She reports previous alcohol use. She reports that she does not use drugs.    Family History: family history includes Cancer in her mother; Diabetes in her maternal grandmother; Hyperlipidemia in her sister; Hypertension in her sister.       Allergies:  Allergies   Allergen Reactions   • Fluoxetine Hcl Shortness Of Breath   • Levofloxacin Anaphylaxis   • Norfloxacin Shortness Of Breath     Medications:  Prior to Admission medications    Medication Sig Start Date End Date Taking? Authorizing Provider   Evolocumab 140 MG/ML solution prefilled syringe Inject 1 mL under the skin into the appropriate area as directed Every 14 (Fourteen) Days. 10/13/20  Yes Alejandra Chaudhari DO   fluticasone-salmeterol (ADVAIR) 250-50 MCG/DOSE DISKUS Inhale 250 puffs 2 (two) times a day. 9/17/20  Yes Justin Hernandez MD   furosemide (LASIX) 20 MG tablet Take 20 mg by mouth Daily. 8/28/20  Yes Justin Hernandez MD   gabapentin (NEURONTIN) 300 MG capsule Take 1 capsule by mouth 3 (Three) Times a Day. 10/13/20  Yes Alejandra Chaudhari DO   glipizide (GLUCOTROL) 5 MG tablet Take 5 mg by mouth 2 (two) times a day. 8/28/20  Yes Justin Hernandez MD   ipratropium-albuterol (DUO-NEB) 0.5-2.5 mg/3 ml nebulizer Take 3 mL by nebulization Every 4 (Four) Hours As Needed for Wheezing. 10/13/20  Yes Alejandra Chaudhari DO   lisinopril (PRINIVIL,ZESTRIL) 10 MG tablet Take 1 tablet by mouth Daily. 10/13/20  Yes Alejandra Chaudhari DO   loratadine (CLARITIN) 10 MG tablet Take 10 mg by mouth Daily As Needed. 8/28/20  Yes Justin Hernandez MD   naproxen sodium (ANAPROX) 550 MG tablet Take 550 mg by mouth 2  "(Two) Times a Day As Needed. 8/28/20  Yes Justin Hernandez MD   nitroglycerin (NITROSTAT) 0.4 MG SL tablet Place 0.4 mg under the tongue Daily As Needed. 9/28/20  Yes Provider, Historical, MD   Omega-3 Fatty Acids (fish oil) 1000 MG capsule capsule Take 1,000 mg by mouth 2 (two) times a day. 6/18/20  Yes Provider, Historical, MD   omeprazole (priLOSEC) 40 MG capsule Take 40 mg by mouth Daily. 9/28/20  Yes Provider, Historical, MD   venlafaxine XR (EFFEXOR-XR) 150 MG 24 hr capsule Take 150 mg by mouth Daily. 8/28/20  Yes Provider, Historical, MD   Ventolin  (90 Base) MCG/ACT inhaler Inhale 90 mcg 2 (two) times a day. 8/28/20  Yes Provider, Historical, MD   vitamin D (ERGOCALCIFEROL) 1.25 MG (22894 UT) capsule capsule Take 50,000 Units by mouth 1 (One) Time Per Week. 9/17/20  Yes Justin Hernandez MD   Xultophy 100-3.6 UNIT-MG/ML solution pen-injector subcutaneous pen Inject 30 Units as directed Every Night. 8/28/20  Yes Justin Hernandez MD       Objective     Vital Signs: /84 (BP Location: Left arm, Patient Position: Sitting, Cuff Size: Adult) Comment: pt was rushing to get here  Pulse 98   Temp 97.1 °F (36.2 °C) (Infrared)   Ht 165.1 cm (65\")   Wt 103 kg (226 lb)   SpO2 96%   Breastfeeding No   BMI 37.61 kg/m²   Physical Exam  Vitals signs reviewed.   HENT:      Head: Normocephalic and atraumatic.      Nose: Nose normal.   Eyes:      Conjunctiva/sclera: Conjunctivae normal.   Neck:      Musculoskeletal: Normal range of motion and neck supple.   Cardiovascular:      Rate and Rhythm: Normal rate and regular rhythm.      Heart sounds: Normal heart sounds.   Pulmonary:      Effort: Pulmonary effort is normal.      Breath sounds: Wheezing present. No rhonchi.   Abdominal:      General: Bowel sounds are normal.      Palpations: Abdomen is soft.   Musculoskeletal:         General: No tenderness or deformity.   Skin:     General: Skin is warm and dry.   Neurological:      General: No focal " deficit present.      Mental Status: She is alert.      Cranial Nerves: No cranial nerve deficit.   Psychiatric:         Mood and Affect: Mood normal.         Behavior: Behavior normal.       Patient's Body mass index is 37.61 kg/m². BMI is above normal parameters. Recommendations include: exercise counseling.      Results Reviewed:  Glucose   Date Value Ref Range Status   06/16/2020 352 (H) 74 - 109 mg/dL Final     BUN   Date Value Ref Range Status   06/16/2020 9 6 - 20 mg/dL Final     Creatinine   Date Value Ref Range Status   06/16/2020 0.7 0.5 - 0.9 mg/dL Final     Sodium   Date Value Ref Range Status   06/16/2020 134 (L) 136 - 145 mmol/L Final     Potassium   Date Value Ref Range Status   06/16/2020 3.4 (L) 3.5 - 5.0 mmol/L Final     Chloride   Date Value Ref Range Status   06/16/2020 92 (L) 98 - 111 mmol/L Final     CO2   Date Value Ref Range Status   06/16/2020 29 22 - 29 mmol/L Final     Calcium   Date Value Ref Range Status   06/16/2020 8.5 (L) 8.6 - 10.0 mg/dL Final     ALT (SGPT)   Date Value Ref Range Status   06/16/2020 8 5 - 33 U/L Final     AST (SGOT)   Date Value Ref Range Status   06/16/2020 10 5 - 32 U/L Final     WBC   Date Value Ref Range Status   06/16/2020 10.5 4.8 - 10.8 K/uL Final     Hematocrit   Date Value Ref Range Status   06/16/2020 39.7 37.0 - 47.0 % Final     Platelets   Date Value Ref Range Status   06/16/2020 281 130 - 400 K/uL Final     Triglycerides   Date Value Ref Range Status   06/16/2020 169 (H) 0 - 149 mg/dL Final     HDL Cholesterol   Date Value Ref Range Status   06/16/2020 51 (L) 65 - 121 mg/dL Final     Comment:     VALUES>60 MG/DL ARE ASSOCIATED WITH A DECREASED RISK OF  ATHEROSCLEROTIC CARDIOVASCULAR DISEASE     LDL Cholesterol    Date Value Ref Range Status   06/16/2020 136 <100 mg/dL Final     Comment:     <100 MG/DL=OPITIMAL    100-129 MG/DL=DESIRABLE    130-159 MG/DL BORDERLINE=INCREASED RISK OF ATHEROSCLEROTIC  CARDIOVASCULAR DISEASE    > OR = 160 MG/DL=ASSOCIATED  WITH AN INCREASE RISK OF  ATHEROSCLEROTIC CARDIOVASCULAR DISEASE     Hemoglobin A1C   Date Value Ref Range Status   06/16/2020 8.7 (H) 4.0 - 6.0 % Final     Comment:     HbA1c levels >6% are an indication of hyperglycemia during the preceding 2  to 3 months or longer.    HbA1c levels may reach 20% or higher in poorly controlled diabetes.  Therapeutic action is suggested at levels above 8%.    Diabetes patients with HbA1c levels below 7% meet the goal of the American  Diabetes Association.    HbA1c levels below the established reference range may indicate recent  episodes of hypoglycemia, the presence of Hb variants, or shortened lifetime  of erythrocytes.          Assessment / Plan     Assessment/Plan:  1. Hypokalemia  - potassium chloride 10 MEQ CR tablet; Take 1 tablet by mouth Daily.  Dispense: 30 tablet; Refill: 1  - Basic metabolic panel; Future    2. Chronic left-sided low back pain with left-sided sciatica  - HYDROcodone-acetaminophen (Norco)  MG per tablet; Take 1 tablet by mouth Every 6 (Six) Hours As Needed for Moderate Pain .  Dispense: 60 tablet; Refill: 0    3. Diabetes insulin dependent  - Improved  - Filled out paperwork for diabetic shoes.   - Refer to podiatry    4. Chronic back pain worse on the left with left sciatica and pain.  - Norco 10 every 6 hours PRN #[id number]    5. Anxiety/Sleep disturbance  - Ativan 0.5 1/2 tab PO nightly PRN sleep/anxiety      Return in about 3 months (around 1/27/2021) for Recheck, Next scheduled follow up. unless patient needs to be seen sooner or acute issues arise.    Code Status: Full    I have discussed the patient results/orders and and plan/recommendation with them at today's visit.      Alejandra Chaudhari, DO   10/27/2020

## 2020-10-29 ENCOUNTER — TELEPHONE (OUTPATIENT)
Dept: INTERNAL MEDICINE | Facility: CLINIC | Age: 60
End: 2020-10-29

## 2020-10-29 NOTE — TELEPHONE ENCOUNTER
Candy from pharmacy called, just asked that I give her a call back,  I called and she is out sick today,  So left message for her to give me a call back.

## 2020-11-05 RX ORDER — GLIPIZIDE 5 MG/1
5 TABLET ORAL 2 TIMES DAILY
Qty: 60 TABLET | Refills: 5 | Status: SHIPPED | OUTPATIENT
Start: 2020-11-05 | End: 2021-03-01 | Stop reason: SDUPTHER

## 2020-11-19 DIAGNOSIS — G89.29 CHRONIC LEFT-SIDED LOW BACK PAIN WITH LEFT-SIDED SCIATICA: ICD-10-CM

## 2020-11-19 DIAGNOSIS — F41.9 ANXIETY: ICD-10-CM

## 2020-11-19 DIAGNOSIS — M54.42 CHRONIC LEFT-SIDED LOW BACK PAIN WITH LEFT-SIDED SCIATICA: ICD-10-CM

## 2020-11-19 NOTE — TELEPHONE ENCOUNTER
CALLED PATIENT TO SEE IF I COULD TRANSFER TO PODIATRY TO GET HER APPT SCHEDULED WITH IRINEO. SHE THEN MENTIONED THAT SHE NEEDS A REFILL ON :    LORazepam (Ativan) 0.5 MG tablet      HYDROcodone-acetaminophen (Norco)  MG per tablet    SENT TO [facility]. SHE SAID IT IS DUE ON THANKSGIVING AND SHE DIDN'T KNOW IF THEY COULD BE FILLED THE DAY BEFORE SINCE THE PHARMACY IS CLOSED FOR THE HOLIDAY.

## 2020-11-24 RX ORDER — LORAZEPAM 0.5 MG/1
0.25 TABLET ORAL EVERY 8 HOURS PRN
Qty: 15 TABLET | Refills: 0 | Status: SHIPPED | OUTPATIENT
Start: 2020-11-24 | End: 2020-12-22 | Stop reason: SDUPTHER

## 2020-11-24 RX ORDER — HYDROCODONE BITARTRATE AND ACETAMINOPHEN 10; 325 MG/1; MG/1
1 TABLET ORAL EVERY 6 HOURS PRN
Qty: 60 TABLET | Refills: 0 | Status: SHIPPED | OUTPATIENT
Start: 2020-11-24 | End: 2020-12-22 | Stop reason: SDUPTHER

## 2020-12-07 ENCOUNTER — TELEPHONE (OUTPATIENT)
Dept: PODIATRY | Facility: CLINIC | Age: 60
End: 2020-12-07

## 2020-12-07 ENCOUNTER — TELEPHONE (OUTPATIENT)
Dept: VASCULAR SURGERY | Facility: CLINIC | Age: 60
End: 2020-12-07

## 2020-12-07 NOTE — TELEPHONE ENCOUNTER
Left message on pts voice mail as a reminder call . Pt has appointment with Dr. Savage on 12/08/2020.

## 2020-12-17 ENCOUNTER — TELEPHONE (OUTPATIENT)
Dept: INTERNAL MEDICINE | Facility: CLINIC | Age: 60
End: 2020-12-17

## 2020-12-17 NOTE — TELEPHONE ENCOUNTER
Spoke with pt, she spoke with the pharmacy,  They got the Effexor to go through, I told her we havent written that for her , it was from previous provider,   She will call next week when it's time for her to get her other medications fill.

## 2020-12-17 NOTE — TELEPHONE ENCOUNTER
PATIENT CALLED IN STATING SHE ONLY HAS ONE PILL LEFT OF HER EFFEXOR-XR. SHE CAN'T GET THE MEDICINE REFILL UNTIL 12/27/20. PLEASE CALL AND ADVISE.       CALL BACK: [phone]

## 2020-12-21 ENCOUNTER — TELEPHONE (OUTPATIENT)
Dept: PODIATRY | Facility: CLINIC | Age: 60
End: 2020-12-21

## 2020-12-21 RX ORDER — OMEPRAZOLE 40 MG/1
CAPSULE, DELAYED RELEASE ORAL
Qty: 90 CAPSULE | Refills: 3 | Status: SHIPPED | OUTPATIENT
Start: 2020-12-21 | End: 2022-08-23 | Stop reason: SDUPTHER

## 2020-12-21 RX ORDER — VENLAFAXINE HYDROCHLORIDE 150 MG/1
CAPSULE, EXTENDED RELEASE ORAL
Qty: 90 CAPSULE | Refills: 1 | Status: SHIPPED | OUTPATIENT
Start: 2020-12-21 | End: 2021-06-21

## 2020-12-22 ENCOUNTER — CLINICAL SUPPORT (OUTPATIENT)
Dept: INTERNAL MEDICINE | Facility: CLINIC | Age: 60
End: 2020-12-22

## 2020-12-22 DIAGNOSIS — M54.42 CHRONIC LEFT-SIDED LOW BACK PAIN WITH LEFT-SIDED SCIATICA: ICD-10-CM

## 2020-12-22 DIAGNOSIS — E11.40 TYPE 2 DIABETES MELLITUS WITH DIABETIC NEUROPATHY, WITH LONG-TERM CURRENT USE OF INSULIN (HCC): ICD-10-CM

## 2020-12-22 DIAGNOSIS — F41.9 ANXIETY: ICD-10-CM

## 2020-12-22 DIAGNOSIS — G89.29 CHRONIC LEFT-SIDED LOW BACK PAIN WITH LEFT-SIDED SCIATICA: ICD-10-CM

## 2020-12-22 DIAGNOSIS — Z79.4 TYPE 2 DIABETES MELLITUS WITH DIABETIC NEUROPATHY, WITH LONG-TERM CURRENT USE OF INSULIN (HCC): ICD-10-CM

## 2020-12-22 DIAGNOSIS — E87.6 HYPOKALEMIA: ICD-10-CM

## 2020-12-22 PROCEDURE — 36415 COLL VENOUS BLD VENIPUNCTURE: CPT | Performed by: INTERNAL MEDICINE

## 2020-12-22 RX ORDER — LORAZEPAM 0.5 MG/1
0.25 TABLET ORAL EVERY 8 HOURS PRN
Qty: 15 TABLET | Refills: 0 | Status: SHIPPED | OUTPATIENT
Start: 2020-12-22 | End: 2021-01-22 | Stop reason: SDUPTHER

## 2020-12-22 RX ORDER — GABAPENTIN 300 MG/1
300 CAPSULE ORAL 3 TIMES DAILY
Qty: 90 CAPSULE | Refills: 0 | Status: SHIPPED | OUTPATIENT
Start: 2020-12-22 | End: 2021-01-22 | Stop reason: SDUPTHER

## 2020-12-22 RX ORDER — HYDROCODONE BITARTRATE AND ACETAMINOPHEN 10; 325 MG/1; MG/1
1 TABLET ORAL EVERY 6 HOURS PRN
Qty: 60 TABLET | Refills: 0 | Status: SHIPPED | OUTPATIENT
Start: 2020-12-22 | End: 2021-01-22 | Stop reason: SDUPTHER

## 2020-12-22 NOTE — PROGRESS NOTES
Venipuncture Blood Specimen Collection  Venipuncture performed in right ac by Anne-Marie Jj MA with good hemostasis. Patient tolerated the procedure well without complications.   12/22/20   Anne-Marie Jj MA

## 2020-12-22 NOTE — TELEPHONE ENCOUNTER
Caller: Nora Carney    Relationship: Self    Best call back number: [phone]    Medication needed:   Requested Prescriptions     Pending Prescriptions Disp Refills   • LORazepam (Ativan) 0.5 MG tablet 15 tablet 0     Sig: Take 0.5 tablets by mouth Every 8 (Eight) Hours As Needed for Anxiety.   • HYDROcodone-acetaminophen (Norco)  MG per tablet 60 tablet 0     Sig: Take 1 tablet by mouth Every 6 (Six) Hours As Needed for Moderate Pain .   • gabapentin (NEURONTIN) 300 MG capsule 90 capsule 0     Sig: Take 1 capsule by mouth 3 (Three) Times a Day.       When do you need the refill by: TODAY      Does the patient have less than a 3 day supply:  [x] Yes  [] No    What is the patient's preferred pharmacy: [facility] [address] [phone] [facility] [phone] FX

## 2020-12-24 LAB
BUN SERPL-MCNC: 5 MG/DL (ref 6–24)
BUN/CREAT SERPL: 6 (ref 9–23)
CALCIUM SERPL-MCNC: 8.4 MG/DL (ref 8.7–10.2)
CHLORIDE SERPL-SCNC: 97 MMOL/L (ref 96–106)
CO2 SERPL-SCNC: 28 MMOL/L (ref 20–29)
CREAT SERPL-MCNC: 0.81 MG/DL (ref 0.57–1)
GLUCOSE SERPL-MCNC: 218 MG/DL (ref 65–99)
POTASSIUM SERPL-SCNC: 3.7 MMOL/L (ref 3.5–5.2)
SODIUM SERPL-SCNC: 140 MMOL/L (ref 134–144)

## 2021-01-20 ENCOUNTER — TELEPHONE (OUTPATIENT)
Dept: PODIATRY | Facility: CLINIC | Age: 61
End: 2021-01-20

## 2021-01-21 DIAGNOSIS — Z79.4 TYPE 2 DIABETES MELLITUS WITH DIABETIC NEUROPATHY, WITH LONG-TERM CURRENT USE OF INSULIN (HCC): ICD-10-CM

## 2021-01-21 DIAGNOSIS — M54.42 CHRONIC LEFT-SIDED LOW BACK PAIN WITH LEFT-SIDED SCIATICA: ICD-10-CM

## 2021-01-21 DIAGNOSIS — E11.40 TYPE 2 DIABETES MELLITUS WITH DIABETIC NEUROPATHY, WITH LONG-TERM CURRENT USE OF INSULIN (HCC): ICD-10-CM

## 2021-01-21 DIAGNOSIS — G89.29 CHRONIC LEFT-SIDED LOW BACK PAIN WITH LEFT-SIDED SCIATICA: ICD-10-CM

## 2021-01-21 DIAGNOSIS — F41.9 ANXIETY: ICD-10-CM

## 2021-01-22 DIAGNOSIS — E11.40 TYPE 2 DIABETES MELLITUS WITH DIABETIC NEUROPATHY, WITH LONG-TERM CURRENT USE OF INSULIN (HCC): ICD-10-CM

## 2021-01-22 DIAGNOSIS — G89.29 CHRONIC LEFT-SIDED LOW BACK PAIN WITH LEFT-SIDED SCIATICA: ICD-10-CM

## 2021-01-22 DIAGNOSIS — Z79.4 TYPE 2 DIABETES MELLITUS WITH DIABETIC NEUROPATHY, WITH LONG-TERM CURRENT USE OF INSULIN (HCC): ICD-10-CM

## 2021-01-22 DIAGNOSIS — F41.9 ANXIETY: ICD-10-CM

## 2021-01-22 DIAGNOSIS — M54.42 CHRONIC LEFT-SIDED LOW BACK PAIN WITH LEFT-SIDED SCIATICA: ICD-10-CM

## 2021-01-22 NOTE — TELEPHONE ENCOUNTER
Caller: Rommel Nora M    Relationship: Self    Best call back number: [phone]    Medication needed:   Requested Prescriptions     Pending Prescriptions Disp Refills   • HYDROcodone-acetaminophen (Norco)  MG per tablet 60 tablet 0     Sig: Take 1 tablet by mouth Every 6 (Six) Hours As Needed for Moderate Pain .   • gabapentin (NEURONTIN) 300 MG capsule 90 capsule 0     Sig: Take 1 capsule by mouth 3 (Three) Times a Day.   • LORazepam (Ativan) 0.5 MG tablet 15 tablet 0     Sig: Take 0.5 tablets by mouth Every 8 (Eight) Hours As Needed for Anxiety.       When do you need the refill by: 1/22/2021    What details did the patient provide when requesting the medication HAS AN APPOINTMENT WITH DR. CAN 2/2/21    Does the patient have less than a 3 day supply:  [x] Yes  [] No    What is the patient's preferred pharmacy: [facility] - [address] [phone] [facility] [phone] FX

## 2021-01-26 ENCOUNTER — TELEPHONE (OUTPATIENT)
Dept: INTERNAL MEDICINE | Facility: CLINIC | Age: 61
End: 2021-01-26

## 2021-01-26 NOTE — TELEPHONE ENCOUNTER
Patient called wondering if her medication can be refilled. She called on the 22nd and was informed that Dr. Chaudhari was out of the office. Patient voiced understanding but is needing med refilled until her appointment with Dr. Chaudhari 02/02/2021    Please review and respond.

## 2021-01-27 ENCOUNTER — TELEPHONE (OUTPATIENT)
Dept: INTERNAL MEDICINE | Facility: CLINIC | Age: 61
End: 2021-01-27

## 2021-01-27 RX ORDER — LORAZEPAM 0.5 MG/1
0.25 TABLET ORAL EVERY 8 HOURS PRN
Qty: 15 TABLET | Refills: 0 | Status: SHIPPED | OUTPATIENT
Start: 2021-01-27 | End: 2021-03-01

## 2021-01-27 RX ORDER — HYDROCODONE BITARTRATE AND ACETAMINOPHEN 10; 325 MG/1; MG/1
1 TABLET ORAL EVERY 6 HOURS PRN
Qty: 60 TABLET | Refills: 0 | Status: SHIPPED | OUTPATIENT
Start: 2021-01-27 | End: 2021-10-21

## 2021-01-27 RX ORDER — GABAPENTIN 300 MG/1
300 CAPSULE ORAL 3 TIMES DAILY
Qty: 90 CAPSULE | Refills: 0 | Status: SHIPPED | OUTPATIENT
Start: 2021-01-27 | End: 2021-03-01

## 2021-01-27 NOTE — TELEPHONE ENCOUNTER
PATIENT CALLED AGAIN TODAY WANTING A RESPONSE REGARDING HER MEDICATION THAT SHE CALLED ABOUT YESTERDAY.    PLEASE CALL AND AND RESPOND  [phone]

## 2021-03-01 ENCOUNTER — OFFICE VISIT (OUTPATIENT)
Dept: INTERNAL MEDICINE | Facility: CLINIC | Age: 61
End: 2021-03-01

## 2021-03-01 VITALS
DIASTOLIC BLOOD PRESSURE: 88 MMHG | SYSTOLIC BLOOD PRESSURE: 154 MMHG | TEMPERATURE: 98.4 F | HEIGHT: 65 IN | BODY MASS INDEX: 35.65 KG/M2 | HEART RATE: 81 BPM | WEIGHT: 214 LBS | OXYGEN SATURATION: 94 %

## 2021-03-01 DIAGNOSIS — F41.9 ANXIETY: ICD-10-CM

## 2021-03-01 DIAGNOSIS — G89.29 CHRONIC LEFT-SIDED LOW BACK PAIN WITH LEFT-SIDED SCIATICA: Primary | ICD-10-CM

## 2021-03-01 DIAGNOSIS — E11.40 TYPE 2 DIABETES MELLITUS WITH DIABETIC NEUROPATHY, WITH LONG-TERM CURRENT USE OF INSULIN (HCC): ICD-10-CM

## 2021-03-01 DIAGNOSIS — Z79.4 TYPE 2 DIABETES MELLITUS WITH DIABETIC NEUROPATHY, WITH LONG-TERM CURRENT USE OF INSULIN (HCC): ICD-10-CM

## 2021-03-01 DIAGNOSIS — M54.42 CHRONIC LEFT-SIDED LOW BACK PAIN WITH LEFT-SIDED SCIATICA: Primary | ICD-10-CM

## 2021-03-01 DIAGNOSIS — J44.1 COPD WITH EXACERBATION (HCC): ICD-10-CM

## 2021-03-01 LAB — HBA1C MFR BLD: 7.3 %

## 2021-03-01 PROCEDURE — 99213 OFFICE O/P EST LOW 20 MIN: CPT | Performed by: INTERNAL MEDICINE

## 2021-03-01 PROCEDURE — 83036 HEMOGLOBIN GLYCOSYLATED A1C: CPT | Performed by: INTERNAL MEDICINE

## 2021-03-01 RX ORDER — GABAPENTIN 300 MG/1
CAPSULE ORAL
Qty: 90 CAPSULE | Refills: 0 | Status: SHIPPED | OUTPATIENT
Start: 2021-03-01 | End: 2021-04-06 | Stop reason: SDUPTHER

## 2021-03-01 RX ORDER — OXYCODONE AND ACETAMINOPHEN 10; 325 MG/1; MG/1
1 TABLET ORAL EVERY 8 HOURS PRN
Qty: 60 TABLET | Refills: 0 | Status: SHIPPED | OUTPATIENT
Start: 2021-03-01 | End: 2021-04-06 | Stop reason: SDUPTHER

## 2021-03-01 RX ORDER — LORAZEPAM 1 MG/1
1 TABLET ORAL EVERY 8 HOURS PRN
Qty: 30 TABLET | Refills: 0 | Status: SHIPPED | OUTPATIENT
Start: 2021-03-01 | End: 2021-04-06 | Stop reason: SDUPTHER

## 2021-03-01 RX ORDER — GLIPIZIDE 5 MG/1
5 TABLET ORAL 2 TIMES DAILY
Qty: 180 TABLET | Refills: 3 | Status: SHIPPED | OUTPATIENT
Start: 2021-03-01 | End: 2021-12-13

## 2021-03-01 NOTE — PROGRESS NOTES
Subjective     Chief Complaint   Patient presents with   • Diarrhea     started around 4 days ago, thinks medication related, needs med refill       History of Present Illness  Anxiety is not controlled. Patient is tearful in the office. Effects her every day. Worse at night. Effects her normal routine and concentration.     Back pain is not controlled. Bilateral lower back with radiation into the left leg. Mild rotation makes her back feel like it is going to snap. Difficulty with laundry and cooking. Having to lean on the sink for support in order to do the dishes.     Patient's PMR from outside medical facility reviewed and noted.    Review of Systems   Constitutional: Negative for chills and fever.   HENT: Negative for congestion and rhinorrhea.    Respiratory: Positive for cough. Negative for shortness of breath.    Cardiovascular: Negative for chest pain and leg swelling.   Gastrointestinal: Positive for constipation. Negative for diarrhea.   Genitourinary: Negative for dysuria and hematuria.   Musculoskeletal: Positive for arthralgias and back pain.   Psychiatric/Behavioral: Positive for sleep disturbance. The patient is nervous/anxious.       Otherwise complete ROS reviewed and negative except as mentioned in the HPI.    Past Medical History:   Past Medical History:   Diagnosis Date   • Anxiety    • Arthritis    • Depression    • Diabetes mellitus (CMS/[facility])    • GERD (gastroesophageal reflux disease)    • Heart problem    • Hyperlipidemia    • Memory problem    • Sleep apnea    • Visual impairment      Past Surgical History:  Past Surgical History:   Procedure Laterality Date   • CHOLECYSTECTOMY     • CORONARY ARTERY BYPASS GRAFT       Social History:  reports that she has been smoking cigarettes. She has a 36.00 pack-year smoking history. She has never used smokeless tobacco. She reports previous alcohol use. She reports that she does not use drugs.    Family History: family history includes Cancer  in her mother; Diabetes in her maternal grandmother; Hyperlipidemia in her sister; Hypertension in her sister.       Allergies:  Allergies   Allergen Reactions   • Fluoxetine Hcl Shortness Of Breath   • Levofloxacin Anaphylaxis   • Norfloxacin Shortness Of Breath     Medications:  Prior to Admission medications    Medication Sig Start Date End Date Taking? Authorizing Provider   Evolocumab 140 MG/ML solution prefilled syringe Inject 1 mL under the skin into the appropriate area as directed Every 14 (Fourteen) Days. 10/13/20  Yes Alejandra Chaudhari DO   fluticasone-salmeterol (ADVAIR) 250-50 MCG/DOSE DISKUS Inhale 250 puffs 2 (two) times a day. 9/17/20  Yes Justin Hernandez MD   furosemide (LASIX) 20 MG tablet Take 20 mg by mouth Daily. 8/28/20  Yes Justin Hernandez MD   gabapentin (NEURONTIN) 300 MG capsule Take 1 capsule by mouth 3 (Three) Times a Day. 1/27/21  Yes Meg Radford DO   glipizide (GLUCOTROL) 5 MG tablet Take 1 tablet by mouth 2 (two) times a day. 11/5/20  Yes Alejandra Chaudhari DO   HYDROcodone-acetaminophen (Norco)  MG per tablet Take 1 tablet by mouth Every 6 (Six) Hours As Needed for Moderate Pain . 1/27/21  Yes Meg Radford DO   ipratropium-albuterol (DUO-NEB) 0.5-2.5 mg/3 ml nebulizer Take 3 mL by nebulization Every 4 (Four) Hours As Needed for Wheezing. 10/13/20  Yes Alejandra Chaudhari DO   lisinopril (PRINIVIL,ZESTRIL) 10 MG tablet Take 1 tablet by mouth Daily. 10/13/20  Yes Alejandra Chaudhari DO   loratadine (CLARITIN) 10 MG tablet Take 10 mg by mouth Daily As Needed. 8/28/20  Yes Justin Hernandez MD   LORazepam (Ativan) 0.5 MG tablet Take 0.5 tablets by mouth Every 8 (Eight) Hours As Needed for Anxiety. 1/27/21  Yes Meg Radford DO   Misc. Devices (Rollator Ultra-Light) misc 1 application Daily. 10/27/20  Yes Alejandra Chaudhari DO   nitroglycerin (NITROSTAT) 0.4 MG SL tablet Place 0.4 mg under the tongue Daily As Needed. 9/28/20  Yes Provider,  "MD Justin   Omega-3 Fatty Acids (fish oil) 1000 MG capsule capsule Take 1,000 mg by mouth 2 (two) times a day. 6/18/20  Yes Justin Hernandez MD   omeprazole (priLOSEC) 40 MG capsule Take 40 mg by mouth Daily. 9/28/20  Yes Justin Hernandez MD   potassium chloride 10 MEQ CR tablet Take 1 tablet by mouth Daily. 10/27/20  Yes Alejandra Chaudhari DO   venlafaxine XR (EFFEXOR-XR) 150 MG 24 hr capsule Take 150 mg by mouth Daily. 8/28/20  Yes Justin Hernandez MD   Ventolin  (90 Base) MCG/ACT inhaler Inhale 90 mcg 2 (two) times a day. 8/28/20  Yes Justin Hernandez MD   vitamin D (ERGOCALCIFEROL) 1.25 MG (68481 UT) capsule capsule Take 50,000 Units by mouth 1 (One) Time Per Week. 9/17/20  Yes Justin Hernandez MD   Xultophy 100-3.6 UNIT-MG/ML solution pen-injector subcutaneous pen Inject 30 Units as directed Every Night. 8/28/20  Yes Justin Hernandez MD   naproxen sodium (ANAPROX) 550 MG tablet Take 550 mg by mouth 2 (Two) Times a Day As Needed. 8/28/20   Justin Hernandez MD       Objective     Vital Signs: /88 (BP Location: Left arm, Patient Position: Sitting, Cuff Size: Large Adult)   Pulse 81   Temp 98.4 °F (36.9 °C) (Skin)   Ht 165.1 cm (65\")   Wt 97.1 kg (214 lb)   SpO2 94%   BMI 35.61 kg/m²   Physical Exam  Vitals signs reviewed.   Constitutional:       Appearance: Normal appearance.   HENT:      Head: Normocephalic and atraumatic.      Nose: Nose normal.   Eyes:      General: No scleral icterus.     Conjunctiva/sclera: Conjunctivae normal.   Neck:      Musculoskeletal: Normal range of motion and neck supple.   Cardiovascular:      Rate and Rhythm: Normal rate and regular rhythm.      Heart sounds: Normal heart sounds.   Pulmonary:      Effort: Pulmonary effort is normal.      Breath sounds: Wheezing present.   Musculoskeletal:         General: No tenderness.   Skin:     General: Skin is warm and dry.   Neurological:      Mental Status: She is alert and oriented " to person, place, and time.      Cranial Nerves: No cranial nerve deficit.   Psychiatric:         Behavior: Behavior normal.      Comments: Tearful in the office.        Patient's Body mass index is 35.61 kg/m². BMI is above normal parameters. Recommendations include: nutrition counseling.      Results Reviewed:  Glucose   Date Value Ref Range Status   06/16/2020 352 (H) 74 - 109 mg/dL Final     BUN   Date Value Ref Range Status   12/22/2020 5 (L) 6 - 24 mg/dL Final   06/16/2020 9 6 - 20 mg/dL Final     Creatinine   Date Value Ref Range Status   12/22/2020 0.81 0.57 - 1.00 mg/dL Final   06/16/2020 0.7 0.5 - 0.9 mg/dL Final     Sodium   Date Value Ref Range Status   12/22/2020 140 134 - 144 mmol/L Final   06/16/2020 134 (L) 136 - 145 mmol/L Final     Potassium   Date Value Ref Range Status   12/22/2020 3.7 3.5 - 5.2 mmol/L Final   06/16/2020 3.4 (L) 3.5 - 5.0 mmol/L Final     Chloride   Date Value Ref Range Status   12/22/2020 97 96 - 106 mmol/L Final   06/16/2020 92 (L) 98 - 111 mmol/L Final     CO2   Date Value Ref Range Status   06/16/2020 29 22 - 29 mmol/L Final     Total CO2   Date Value Ref Range Status   12/22/2020 28 20 - 29 mmol/L Final     Calcium   Date Value Ref Range Status   12/22/2020 8.4 (L) 8.7 - 10.2 mg/dL Final   06/16/2020 8.5 (L) 8.6 - 10.0 mg/dL Final     ALT (SGPT)   Date Value Ref Range Status   06/16/2020 8 5 - 33 U/L Final     AST (SGOT)   Date Value Ref Range Status   06/16/2020 10 5 - 32 U/L Final     WBC   Date Value Ref Range Status   06/16/2020 10.5 4.8 - 10.8 K/uL Final     Hematocrit   Date Value Ref Range Status   06/16/2020 39.7 37.0 - 47.0 % Final     Platelets   Date Value Ref Range Status   06/16/2020 281 130 - 400 K/uL Final     Triglycerides   Date Value Ref Range Status   06/16/2020 169 (H) 0 - 149 mg/dL Final     HDL Cholesterol   Date Value Ref Range Status   06/16/2020 51 (L) 65 - 121 mg/dL Final     Comment:     VALUES>60 MG/DL ARE ASSOCIATED WITH A DECREASED RISK  OF  ATHEROSCLEROTIC CARDIOVASCULAR DISEASE     LDL Cholesterol    Date Value Ref Range Status   06/16/2020 136 <100 mg/dL Final     Comment:     <100 MG/DL=OPITIMAL    100-129 MG/DL=DESIRABLE    130-159 MG/DL BORDERLINE=INCREASED RISK OF ATHEROSCLEROTIC  CARDIOVASCULAR DISEASE    > OR = 160 MG/DL=ASSOCIATED WITH AN INCREASE RISK OF  ATHEROSCLEROTIC CARDIOVASCULAR DISEASE     Hemoglobin A1C   Date Value Ref Range Status   06/16/2020 8.7 (H) 4.0 - 6.0 % Final     Comment:     HbA1c levels >6% are an indication of hyperglycemia during the preceding 2  to 3 months or longer.    HbA1c levels may reach 20% or higher in poorly controlled diabetes.  Therapeutic action is suggested at levels above 8%.    Diabetes patients with HbA1c levels below 7% meet the goal of the American  Diabetes Association.    HbA1c levels below the established reference range may indicate recent  episodes of hypoglycemia, the presence of Hb variants, or shortened lifetime  of erythrocytes.          Assessment / Plan     Assessment/Plan:  1. Chronic left-sided low back pain with left-sided sciatica  - oxyCODONE-acetaminophen (Percocet)  MG per tablet; Take 1 tablet by mouth Every 8 (Eight) Hours As Needed for Moderate Pain .  Dispense: 60 tablet; Refill: 0    2. Anxiety  - LORazepam (Ativan) 1 MG tablet; Take 1 tablet by mouth Every 8 (Eight) Hours As Needed for Anxiety.  Dispense: 30 tablet; Refill: 0    3. Type 2 diabetes mellitus with diabetic neuropathy, with long-term current use of insulin (CMS/[facility])  - glipizide (GLUCOTROL) 5 MG tablet; Take 1 tablet by mouth 2 (two) times a day.  Dispense: 180 tablet; Refill: 3  - POC A1C    4. COPD with exacerbation (CMS/[facility])  - Fluticasone-Umeclidin-Vilant (Trelegy Ellipta) 200-62.5-25 MCG/INH aerosol powder ; Inhale 1 puff Daily.  Dispense: 60 each; Refill: 11        Return in about 4 weeks (around 3/29/2021) for Recheck, Next scheduled follow up. unless patient needs to be seen sooner or acute  issues arise.    Code Status: Full    I have discussed the patient results/orders and and plan/recommendation with them at today's visit.      Alejandra Chaudhari, DO   03/01/2021

## 2021-03-24 DIAGNOSIS — R60.0 BILATERAL LOWER EXTREMITY EDEMA: ICD-10-CM

## 2021-03-24 DIAGNOSIS — Z79.4 TYPE 2 DIABETES MELLITUS WITH HYPERGLYCEMIA, WITH LONG-TERM CURRENT USE OF INSULIN (HCC): ICD-10-CM

## 2021-03-24 DIAGNOSIS — E11.65 TYPE 2 DIABETES MELLITUS WITH HYPERGLYCEMIA, WITH LONG-TERM CURRENT USE OF INSULIN (HCC): ICD-10-CM

## 2021-03-24 RX ORDER — FUROSEMIDE 20 MG/1
TABLET ORAL
Qty: 30 TABLET | Refills: 2 | Status: SHIPPED | OUTPATIENT
Start: 2021-03-24 | End: 2021-08-17

## 2021-03-24 RX ORDER — (INSULIN DEGLUDEC AND LIRAGLUTIDE) 100; 3.6 [IU]/ML; MG/ML
INJECTION, SOLUTION SUBCUTANEOUS
Qty: 15 ML | Refills: 5 | Status: SHIPPED | OUTPATIENT
Start: 2021-03-24 | End: 2022-09-08

## 2021-03-29 DIAGNOSIS — F41.9 ANXIETY: ICD-10-CM

## 2021-03-29 DIAGNOSIS — G89.29 CHRONIC LEFT-SIDED LOW BACK PAIN WITH LEFT-SIDED SCIATICA: ICD-10-CM

## 2021-03-29 DIAGNOSIS — E11.40 TYPE 2 DIABETES MELLITUS WITH DIABETIC NEUROPATHY, WITH LONG-TERM CURRENT USE OF INSULIN (HCC): ICD-10-CM

## 2021-03-29 DIAGNOSIS — Z79.4 TYPE 2 DIABETES MELLITUS WITH DIABETIC NEUROPATHY, WITH LONG-TERM CURRENT USE OF INSULIN (HCC): ICD-10-CM

## 2021-03-29 DIAGNOSIS — M54.42 CHRONIC LEFT-SIDED LOW BACK PAIN WITH LEFT-SIDED SCIATICA: ICD-10-CM

## 2021-03-29 RX ORDER — GABAPENTIN 300 MG/1
CAPSULE ORAL
Qty: 90 CAPSULE | Refills: 0 | OUTPATIENT
Start: 2021-03-29

## 2021-03-29 RX ORDER — OXYCODONE AND ACETAMINOPHEN 10; 325 MG/1; MG/1
1 TABLET ORAL EVERY 8 HOURS PRN
Qty: 60 TABLET | Refills: 0 | OUTPATIENT
Start: 2021-03-29

## 2021-03-29 RX ORDER — LORAZEPAM 1 MG/1
1 TABLET ORAL EVERY 8 HOURS PRN
Qty: 30 TABLET | Refills: 0 | OUTPATIENT
Start: 2021-03-29

## 2021-04-06 ENCOUNTER — OFFICE VISIT (OUTPATIENT)
Dept: INTERNAL MEDICINE | Facility: CLINIC | Age: 61
End: 2021-04-06

## 2021-04-06 VITALS
TEMPERATURE: 98.2 F | WEIGHT: 222 LBS | DIASTOLIC BLOOD PRESSURE: 80 MMHG | SYSTOLIC BLOOD PRESSURE: 137 MMHG | BODY MASS INDEX: 36.99 KG/M2 | HEIGHT: 65 IN | HEART RATE: 92 BPM | OXYGEN SATURATION: 93 %

## 2021-04-06 DIAGNOSIS — E11.40 TYPE 2 DIABETES MELLITUS WITH DIABETIC NEUROPATHY, WITH LONG-TERM CURRENT USE OF INSULIN (HCC): ICD-10-CM

## 2021-04-06 DIAGNOSIS — K21.9 GASTROESOPHAGEAL REFLUX DISEASE WITHOUT ESOPHAGITIS: ICD-10-CM

## 2021-04-06 DIAGNOSIS — Z79.899 LONG TERM USE OF DRUG: Primary | ICD-10-CM

## 2021-04-06 DIAGNOSIS — F41.9 ANXIETY: ICD-10-CM

## 2021-04-06 DIAGNOSIS — I10 ESSENTIAL HYPERTENSION: ICD-10-CM

## 2021-04-06 DIAGNOSIS — I25.10 CORONARY ARTERY DISEASE INVOLVING NATIVE HEART WITHOUT ANGINA PECTORIS, UNSPECIFIED VESSEL OR LESION TYPE: ICD-10-CM

## 2021-04-06 DIAGNOSIS — Z79.4 TYPE 2 DIABETES MELLITUS WITH DIABETIC NEUROPATHY, WITH LONG-TERM CURRENT USE OF INSULIN (HCC): ICD-10-CM

## 2021-04-06 DIAGNOSIS — M54.42 CHRONIC LEFT-SIDED LOW BACK PAIN WITH LEFT-SIDED SCIATICA: ICD-10-CM

## 2021-04-06 DIAGNOSIS — G89.29 CHRONIC LEFT-SIDED LOW BACK PAIN WITH LEFT-SIDED SCIATICA: ICD-10-CM

## 2021-04-06 PROCEDURE — 99214 OFFICE O/P EST MOD 30 MIN: CPT | Performed by: INTERNAL MEDICINE

## 2021-04-06 RX ORDER — OMEPRAZOLE 40 MG/1
40 CAPSULE, DELAYED RELEASE ORAL DAILY
Qty: 90 CAPSULE | Refills: 1 | Status: SHIPPED | OUTPATIENT
Start: 2021-04-06 | End: 2021-08-18 | Stop reason: SDUPTHER

## 2021-04-06 RX ORDER — OXYCODONE AND ACETAMINOPHEN 10; 325 MG/1; MG/1
1 TABLET ORAL EVERY 8 HOURS PRN
Qty: 60 TABLET | Refills: 0 | Status: ON HOLD | OUTPATIENT
Start: 2021-04-06 | End: 2021-11-03

## 2021-04-06 RX ORDER — LORAZEPAM 1 MG/1
1 TABLET ORAL EVERY 8 HOURS PRN
Qty: 30 TABLET | Refills: 0 | Status: SHIPPED | OUTPATIENT
Start: 2021-04-06 | End: 2021-10-21

## 2021-04-06 RX ORDER — GABAPENTIN 300 MG/1
300 CAPSULE ORAL 3 TIMES DAILY
Qty: 90 CAPSULE | Refills: 0 | Status: SHIPPED | OUTPATIENT
Start: 2021-04-06 | End: 2021-10-21

## 2021-04-06 NOTE — PROGRESS NOTES
Subjective     Chief Complaint   Patient presents with   • Back Pain   • Long term drug use       Back Pain  This is a chronic problem. The current episode started more than 1 year ago. The problem occurs constantly. The problem has been gradually worsening since onset. The pain is present in the lumbar spine. The quality of the pain is described as burning. The pain radiates to the left foot. The pain is at a severity of 7/10. The pain is moderate. The pain is the same all the time. The symptoms are aggravated by standing. Stiffness is present all day. Pertinent negatives include no chest pain, dysuria or fever. Risk factors include obesity and sedentary lifestyle. She has tried heat and muscle relaxant for the symptoms.     Constant all day back pain.     Getting worse. Left leg radiation with numbness of the left foot. Bilateral hip pain.   Nothing is making it better, but pain pills make it better. Standing and washing dishes makes it better. Back brace is helping to ease it some. Lying down doesn't help.     Patient states that diabetes has been under control.   BP control has improved.     Has been on Effexor since Bipass surgery. Sleep has improved. Waking every 3 hours. Not staying awake all night.     HX CAD. Not had to use any Nitro.     Patient's PMR from outside medical facility reviewed and noted.    Review of Systems   Constitutional: Negative for chills and fever.   HENT: Negative for congestion and rhinorrhea.    Respiratory: Positive for wheezing. Negative for cough.    Cardiovascular: Negative for chest pain and leg swelling.   Gastrointestinal: Positive for constipation. Negative for diarrhea.   Genitourinary: Negative for dysuria and enuresis.   Musculoskeletal: Positive for arthralgias and back pain.      Otherwise complete ROS reviewed and negative except as mentioned in the HPI.    Past Medical History:   Past Medical History:   Diagnosis Date   • Anxiety    • Arthritis    • Depression     • Diabetes mellitus (CMS/HCC)    • GERD (gastroesophageal reflux disease)    • Heart problem    • Hyperlipidemia    • Memory problem    • Sleep apnea    • Visual impairment      Past Surgical History:  Past Surgical History:   Procedure Laterality Date   • CHOLECYSTECTOMY     • CORONARY ARTERY BYPASS GRAFT       Social History:  reports that she has been smoking cigarettes. She has a 36.00 pack-year smoking history. She has never used smokeless tobacco. She reports previous alcohol use. She reports that she does not use drugs.    Family History: family history includes Cancer in her mother; Diabetes in her maternal grandmother; Hyperlipidemia in her sister; Hypertension in her sister.       Allergies:  Allergies   Allergen Reactions   • Fluoxetine Hcl Shortness Of Breath   • Levofloxacin Anaphylaxis   • Norfloxacin Shortness Of Breath     Medications:  Prior to Admission medications    Medication Sig Start Date End Date Taking? Authorizing Provider   Evolocumab 140 MG/ML solution prefilled syringe Inject 1 mL under the skin into the appropriate area as directed Every 14 (Fourteen) Days. 10/13/20  Yes Alejandra Chaudhari, DO   Fluticasone-Umeclidin-Vilant (Trelegy Ellipta) 200-62.5-25 MCG/INH aerosol powder  Inhale 1 puff Daily. 3/1/21  Yes Alejandra Chaudhari, DO   furosemide (LASIX) 20 MG tablet Take 20 mg by mouth Daily. 8/28/20  Yes Provider, MD Justin   gabapentin (NEURONTIN) 300 MG capsule TAKE ONE CAPSULE BY MOUTH THREE TIMES DAILY ** MAY CAUSE DROWSINESS ** 3/1/21  Yes Alejandra Chaudhari DO   glipizide (GLUCOTROL) 5 MG tablet Take 1 tablet by mouth 2 (two) times a day. 3/1/21  Yes Alejandra Chaudhari, DO   HYDROcodone-acetaminophen (Norco)  MG per tablet Take 1 tablet by mouth Every 6 (Six) Hours As Needed for Moderate Pain . 1/27/21  Yes Meg Radford, DO   ipratropium-albuterol (DUO-NEB) 0.5-2.5 mg/3 ml nebulizer Take 3 mL by nebulization Every 4 (Four) Hours As Needed for Wheezing.  10/13/20  Yes Alejandra Chaudhari DO   lisinopril (PRINIVIL,ZESTRIL) 10 MG tablet Take 1 tablet by mouth Daily. 10/13/20  Yes Alejandra Chaudhari DO   loratadine (CLARITIN) 10 MG tablet Take 10 mg by mouth Daily As Needed. 8/28/20  Yes Justin Hernandez MD   LORazepam (Ativan) 1 MG tablet Take 1 tablet by mouth Every 8 (Eight) Hours As Needed for Anxiety. 3/1/21  Yes Alejandra Chaudhari DO   Misc. Devices (Rollator Ultra-Light) misc 1 application Daily. 10/27/20  Yes Alejandra Chaudhari DO   nitroglycerin (NITROSTAT) 0.4 MG SL tablet Place 0.4 mg under the tongue Daily As Needed. 9/28/20  Yes Justin Hernandez MD   Omega-3 Fatty Acids (fish oil) 1000 MG capsule capsule Take 1,000 mg by mouth 2 (two) times a day. 6/18/20  Yes Justin Hernandez MD   omeprazole (priLOSEC) 40 MG capsule Take 40 mg by mouth Daily. 9/28/20  Yes Justin Hernandez MD   oxyCODONE-acetaminophen (Percocet)  MG per tablet Take 1 tablet by mouth Every 8 (Eight) Hours As Needed for Moderate Pain . 3/1/21  Yes Alejandra Chaudhari DO   potassium chloride 10 MEQ CR tablet Take 1 tablet by mouth Daily. 10/27/20  Yes Alejandra Chaudhari DO   venlafaxine XR (EFFEXOR-XR) 150 MG 24 hr capsule Take 150 mg by mouth Daily. 8/28/20  Yes Justin Hernandez MD   Ventolin  (90 Base) MCG/ACT inhaler Inhale 90 mcg 2 (two) times a day. 8/28/20  Yes Justin Hernandez MD   vitamin D (ERGOCALCIFEROL) 1.25 MG (71516 UT) capsule capsule Take 50,000 Units by mouth 1 (One) Time Per Week. 9/17/20  Yes Justin Hernandez MD Xultophy 100-3.6 UNIT-MG/ML solution pen-injector subcutaneous pen Inject 30 Units as directed Every Night. 8/28/20  Yes Justin Hernandez MD   naproxen sodium (ANAPROX) 550 MG tablet Take 550 mg by mouth 2 (Two) Times a Day As Needed. 8/28/20 4/6/21  Provider, MD Justin       Objective     Vital Signs: /80 (BP Location: Left arm, Patient Position: Sitting, Cuff Size: Adult)   Pulse 92   Temp  "98.2 °F (36.8 °C) (Infrared)   Ht 165.1 cm (65\")   Wt 101 kg (222 lb)   SpO2 93%   Breastfeeding No   BMI 36.94 kg/m²   Physical Exam  Vitals reviewed.   Constitutional:       Appearance: Normal appearance. She is obese.   HENT:      Head: Normocephalic and atraumatic.      Nose: Nose normal.   Eyes:      General: No scleral icterus.     Conjunctiva/sclera: Conjunctivae normal.   Cardiovascular:      Rate and Rhythm: Normal rate and regular rhythm.      Heart sounds: Normal heart sounds.   Pulmonary:      Effort: Pulmonary effort is normal.      Breath sounds: Wheezing present.   Musculoskeletal:         General: No swelling or tenderness.      Cervical back: Normal range of motion and neck supple.   Skin:     General: Skin is warm and dry.   Neurological:      General: No focal deficit present.      Mental Status: She is alert.      Cranial Nerves: No cranial nerve deficit.   Psychiatric:         Mood and Affect: Mood normal.         Behavior: Behavior normal.       Patient's Body mass index is 36.94 kg/m². BMI is above normal parameters. Recommendations include: nutrition counseling.      Results Reviewed:  Glucose   Date Value Ref Range Status   06/16/2020 352 (H) 74 - 109 mg/dL Final     BUN   Date Value Ref Range Status   12/22/2020 5 (L) 6 - 24 mg/dL Final   06/16/2020 9 6 - 20 mg/dL Final     Creatinine   Date Value Ref Range Status   12/22/2020 0.81 0.57 - 1.00 mg/dL Final   06/16/2020 0.7 0.5 - 0.9 mg/dL Final     Sodium   Date Value Ref Range Status   12/22/2020 140 134 - 144 mmol/L Final   06/16/2020 134 (L) 136 - 145 mmol/L Final     Potassium   Date Value Ref Range Status   12/22/2020 3.7 3.5 - 5.2 mmol/L Final   06/16/2020 3.4 (L) 3.5 - 5.0 mmol/L Final     Chloride   Date Value Ref Range Status   12/22/2020 97 96 - 106 mmol/L Final   06/16/2020 92 (L) 98 - 111 mmol/L Final     CO2   Date Value Ref Range Status   06/16/2020 29 22 - 29 mmol/L Final     Total CO2   Date Value Ref Range Status "   12/22/2020 28 20 - 29 mmol/L Final     Calcium   Date Value Ref Range Status   12/22/2020 8.4 (L) 8.7 - 10.2 mg/dL Final   06/16/2020 8.5 (L) 8.6 - 10.0 mg/dL Final     ALT (SGPT)   Date Value Ref Range Status   06/16/2020 8 5 - 33 U/L Final     AST (SGOT)   Date Value Ref Range Status   06/16/2020 10 5 - 32 U/L Final     WBC   Date Value Ref Range Status   06/16/2020 10.5 4.8 - 10.8 K/uL Final     Hematocrit   Date Value Ref Range Status   06/16/2020 39.7 37.0 - 47.0 % Final     Platelets   Date Value Ref Range Status   06/16/2020 281 130 - 400 K/uL Final     Triglycerides   Date Value Ref Range Status   06/16/2020 169 (H) 0 - 149 mg/dL Final     HDL Cholesterol   Date Value Ref Range Status   06/16/2020 51 (L) 65 - 121 mg/dL Final     Comment:     VALUES>60 MG/DL ARE ASSOCIATED WITH A DECREASED RISK OF  ATHEROSCLEROTIC CARDIOVASCULAR DISEASE     LDL Cholesterol    Date Value Ref Range Status   06/16/2020 136 <100 mg/dL Final     Comment:     <100 MG/DL=OPITIMAL    100-129 MG/DL=DESIRABLE    130-159 MG/DL BORDERLINE=INCREASED RISK OF ATHEROSCLEROTIC  CARDIOVASCULAR DISEASE    > OR = 160 MG/DL=ASSOCIATED WITH AN INCREASE RISK OF  ATHEROSCLEROTIC CARDIOVASCULAR DISEASE     Hemoglobin A1C   Date Value Ref Range Status   03/01/2021 7.3 % Final   06/16/2020 8.7 (H) 4.0 - 6.0 % Final     Comment:     HbA1c levels >6% are an indication of hyperglycemia during the preceding 2  to 3 months or longer.    HbA1c levels may reach 20% or higher in poorly controlled diabetes.  Therapeutic action is suggested at levels above 8%.    Diabetes patients with HbA1c levels below 7% meet the goal of the American  Diabetes Association.    HbA1c levels below the established reference range may indicate recent  episodes of hypoglycemia, the presence of Hb variants, or shortened lifetime  of erythrocytes.          Assessment / Plan     Assessment/Plan:  1. Long term use of drug  - Compliance Drug Analysis, Ur - Urine, Clean Catch    2.  Chronic left-sided low back pain with left-sided sciatica  - oxyCODONE-acetaminophen (Percocet)  MG per tablet; Take 1 tablet by mouth Every 8 (Eight) Hours As Needed for Moderate Pain .  Dispense: 60 tablet; Refill: 0  - Ambulatory Referral to Neurosurgery    3. Gastroesophageal reflux disease without esophagitis  - omeprazole (priLOSEC) 40 MG capsule; Take 1 capsule by mouth Daily.  Dispense: 90 capsule; Refill: 1    4. Anxiety  - LORazepam (Ativan) 1 MG tablet; Take 1 tablet by mouth Every 8 (Eight) Hours As Needed for Anxiety.  Dispense: 30 tablet; Refill: 0    5. Type 2 diabetes mellitus with diabetic neuropathy, with long-term current use of insulin (CMS/[facility])  - gabapentin (NEURONTIN) 300 MG capsule; Take 1 capsule by mouth 3 (Three) Times a Day.  Dispense: 90 capsule; Refill: 0  - Hemoglobin A1c; Future  - Vitamin D 25 hydroxy; Future    6. Essential hypertension  - CBC w AUTO Differential; Future  - Comprehensive metabolic panel; Future    7. Coronary artery disease involving native heart without angina pectoris, unspecified vessel or lesion type  - Lipid panel; Future    8. Body mass index (BMI) 36.94, adult   - Vitamin D 25 hydroxy; Future    Discussed tobacco cessation and risk with HX of CAD.     Return in about 3 months (around 7/6/2021) for Recheck, Next scheduled follow up. unless patient needs to be seen sooner or acute issues arise.    Code Status: Full    I have discussed the patient results/orders and and plan/recommendation with them at today's visit.      Alejandra Chaudhari,    04/06/2021

## 2021-04-08 ENCOUNTER — TELEPHONE (OUTPATIENT)
Dept: VASCULAR SURGERY | Facility: CLINIC | Age: 61
End: 2021-04-08

## 2021-04-08 NOTE — TELEPHONE ENCOUNTER
Tried to call the patient to see if she wanted to try to reschedule her appt with Dr. Savage.  There was no answer or machine

## 2021-04-11 LAB — DRUGS UR: NORMAL

## 2021-04-30 DIAGNOSIS — J44.9 CHRONIC OBSTRUCTIVE PULMONARY DISEASE, UNSPECIFIED COPD TYPE (HCC): ICD-10-CM

## 2021-05-03 DIAGNOSIS — E87.6 HYPOKALEMIA: ICD-10-CM

## 2021-05-03 RX ORDER — POTASSIUM CHLORIDE 750 MG/1
10 TABLET, FILM COATED, EXTENDED RELEASE ORAL DAILY
Qty: 30 TABLET | Refills: 1 | Status: SHIPPED | OUTPATIENT
Start: 2021-05-03 | End: 2021-10-21 | Stop reason: SDUPTHER

## 2021-05-05 RX ORDER — GABAPENTIN 300 MG/1
300 CAPSULE ORAL 3 TIMES DAILY
Qty: 45 CAPSULE | Refills: 0 | OUTPATIENT
Start: 2021-05-05

## 2021-05-05 RX ORDER — HYDROCODONE BITARTRATE AND ACETAMINOPHEN 10; 325 MG/1; MG/1
1 TABLET ORAL EVERY 6 HOURS PRN
Qty: 30 TABLET | Refills: 0 | OUTPATIENT
Start: 2021-05-05

## 2021-05-05 RX ORDER — LORAZEPAM 0.5 MG/1
0.25 TABLET ORAL EVERY 8 HOURS PRN
Qty: 8 TABLET | Refills: 0 | OUTPATIENT
Start: 2021-05-05

## 2021-06-21 ENCOUNTER — TELEPHONE (OUTPATIENT)
Dept: NEUROSURGERY | Facility: CLINIC | Age: 61
End: 2021-06-21

## 2021-06-21 DIAGNOSIS — F32.A ANXIETY AND DEPRESSION: ICD-10-CM

## 2021-06-21 DIAGNOSIS — F41.9 ANXIETY AND DEPRESSION: ICD-10-CM

## 2021-06-21 RX ORDER — VENLAFAXINE HYDROCHLORIDE 150 MG/1
CAPSULE, EXTENDED RELEASE ORAL
Qty: 90 CAPSULE | Refills: 0 | Status: SHIPPED | OUTPATIENT
Start: 2021-06-21 | End: 2022-08-23 | Stop reason: SDUPTHER

## 2021-06-22 NOTE — TELEPHONE ENCOUNTER
Left voicemail that she needs an appointment in office for future refills and instructed to call prior to running out

## 2021-07-06 ENCOUNTER — TELEPHONE (OUTPATIENT)
Dept: FAMILY MEDICINE CLINIC | Age: 61
End: 2021-07-06

## 2021-07-06 NOTE — TELEPHONE ENCOUNTER
Called patient to inform her that she is due for a diabetic follow up and she said that she is seeing Dr Kaylen Rodrigues in [place] now for primary care. PCP changed in chart.

## 2021-07-16 ENCOUNTER — TELEPHONE (OUTPATIENT)
Dept: INTERNAL MEDICINE | Facility: CLINIC | Age: 61
End: 2021-07-16

## 2021-07-16 NOTE — TELEPHONE ENCOUNTER
PHARMACY CALLED IN STATED THEY ACCIDENTALLY SENT THE NOTICE TO HER PREVIOUS PROVIDER AND HAS NOW SENT IT TO OUR ATTENTION     PHARMACY STATES PATIENT IS OUT OF INSULIN AT THIS TIME     GOOD CALL BACK  [phone]

## 2021-07-16 NOTE — TELEPHONE ENCOUNTER
Caller: Nora Carney    Relationship: Self    Best call back number: [phone]    What is the best time to reach you:   ANYTIME    Who are you requesting to speak with (clinical staff, provider,  specific staff member):   PCP OR NURSE    Do you know the name of the person who called:   NORA CARNEY    What was the call regarding:   PATIENT CALLED AND ADVISED THAT SHE IS SUPPOSED TO HAVE A PRIOR AUTHORIZATION FOR Xultophy 100-3.6 UNIT-MG/ML solution pen-injector subcutaneous pen    PATIENT SAID PHARMACY ADVISED THAT THIS WAS SENT MULTIPLE TIMES SINCE 07/08/21    Do you require a callback:   YES

## 2021-07-19 ENCOUNTER — TELEPHONE (OUTPATIENT)
Dept: INTERNAL MEDICINE | Facility: CLINIC | Age: 61
End: 2021-07-19

## 2021-07-19 DIAGNOSIS — E78.5 DYSLIPIDEMIA: ICD-10-CM

## 2021-07-19 DIAGNOSIS — E55.9 VITAMIN D DEFICIENCY: Primary | ICD-10-CM

## 2021-07-19 NOTE — TELEPHONE ENCOUNTER
Called pt to check on her and see how her BP was doing. Pt states at recheck her BP was 148/76 and she felt much better. Pt has apt with Dr. Chaudhari tomorrow. Told pt to call us if she needed anything else. Pt voiced understanding.

## 2021-07-19 NOTE — TELEPHONE ENCOUNTER
Pt called to cancel her lab appt for today. She has an appt tomorrow with Dr. Chaudhari, so she is going to do the labs then. She wanted to let Dr. Chaudhari know that her BP was high. It was 161/94. I asked her what her normal is and she said it's usually around 120's/70's. She stated that she took her clonidine after she took her BP. I told her I would let the clinical staff know and to call us back if her BP did not go down soon and that I would also have someone call to check on her. Pt voiced understanding.

## 2021-07-20 ENCOUNTER — OFFICE VISIT (OUTPATIENT)
Dept: INTERNAL MEDICINE | Facility: CLINIC | Age: 61
End: 2021-07-20

## 2021-07-20 ENCOUNTER — TELEPHONE (OUTPATIENT)
Dept: NEUROSURGERY | Facility: CLINIC | Age: 61
End: 2021-07-20

## 2021-07-20 VITALS
WEIGHT: 213 LBS | HEIGHT: 65 IN | HEART RATE: 98 BPM | DIASTOLIC BLOOD PRESSURE: 96 MMHG | TEMPERATURE: 98.5 F | SYSTOLIC BLOOD PRESSURE: 187 MMHG | BODY MASS INDEX: 35.49 KG/M2 | OXYGEN SATURATION: 96 %

## 2021-07-20 DIAGNOSIS — Z79.899 LONG TERM USE OF DRUG: Primary | ICD-10-CM

## 2021-07-20 DIAGNOSIS — E11.40 TYPE 2 DIABETES MELLITUS WITH DIABETIC NEUROPATHY, WITH LONG-TERM CURRENT USE OF INSULIN (HCC): ICD-10-CM

## 2021-07-20 DIAGNOSIS — G89.29 CHRONIC LOW BACK PAIN WITH LEFT-SIDED SCIATICA, UNSPECIFIED BACK PAIN LATERALITY: ICD-10-CM

## 2021-07-20 DIAGNOSIS — I25.10 CORONARY ARTERY DISEASE INVOLVING NATIVE HEART WITHOUT ANGINA PECTORIS, UNSPECIFIED VESSEL OR LESION TYPE: ICD-10-CM

## 2021-07-20 DIAGNOSIS — L90.5 SCAR: ICD-10-CM

## 2021-07-20 DIAGNOSIS — M54.42 CHRONIC LOW BACK PAIN WITH LEFT-SIDED SCIATICA, UNSPECIFIED BACK PAIN LATERALITY: ICD-10-CM

## 2021-07-20 DIAGNOSIS — Z79.4 TYPE 2 DIABETES MELLITUS WITH DIABETIC NEUROPATHY, WITH LONG-TERM CURRENT USE OF INSULIN (HCC): ICD-10-CM

## 2021-07-20 DIAGNOSIS — F33.1 MODERATE EPISODE OF RECURRENT MAJOR DEPRESSIVE DISORDER (HCC): ICD-10-CM

## 2021-07-20 DIAGNOSIS — I10 ESSENTIAL HYPERTENSION: ICD-10-CM

## 2021-07-20 PROCEDURE — 99213 OFFICE O/P EST LOW 20 MIN: CPT | Performed by: INTERNAL MEDICINE

## 2021-07-20 RX ORDER — LAMOTRIGINE 25 MG/1
25 TABLET ORAL 2 TIMES DAILY
Qty: 60 TABLET | Refills: 1 | Status: SHIPPED | OUTPATIENT
Start: 2021-07-20

## 2021-07-20 RX ORDER — DESVENLAFAXINE 100 MG/1
100 TABLET, EXTENDED RELEASE ORAL DAILY
Qty: 30 TABLET | Refills: 1 | Status: SHIPPED | OUTPATIENT
Start: 2021-07-20 | End: 2021-08-24 | Stop reason: SDUPTHER

## 2021-07-20 NOTE — PROGRESS NOTES
Subjective     Chief Complaint   Patient presents with   • Chronic Back pain     3 mo fu   • type 2 Diabeties     3 mo fu       Diabetes  She presents for her follow-up diabetic visit. She has type 2 diabetes mellitus. No MedicAlert identification noted. Her disease course has been stable. Pertinent negatives for diabetes include no chest pain. There are no hypoglycemic complications. Symptoms are stable (Ran out of her insulin for three days. ). Risk factors for coronary artery disease include obesity, tobacco exposure, post-menopausal and sedentary lifestyle. She is compliant with treatment most of the time. She has not had a previous visit with a dietitian. She rarely participates in exercise. There is no change in her home blood glucose trend. An ACE inhibitor/angiotensin II receptor blocker is being taken. She does not see a podiatrist.Eye exam is not current.     Sister states that she needs her Effexor changed. Patient states that she is tired of hurting and all she wants to do is cry.   Not sleeping but when she does wear herself down she wants to sleep all the time.   Ongoing back and hip pain. Bilateral foot pain. Reviewed patient's drug screen. Patient states that she did take a Suboxone but has not done meth in the past.     Patient's PMR from outside medical facility reviewed and noted.    Review of Systems   Constitutional: Negative for chills and fever.   HENT: Negative for congestion and rhinorrhea.    Cardiovascular: Negative for chest pain and leg swelling.   Gastrointestinal: Negative for constipation and diarrhea.   Genitourinary: Negative for dysuria and hematuria.   Musculoskeletal: Positive for back pain and gait problem.   Psychiatric/Behavioral: Positive for dysphoric mood and sleep disturbance.      Otherwise complete ROS reviewed and negative except as mentioned in the HPI.    Past Medical History:   Past Medical History:   Diagnosis Date   • Anxiety    • Arthritis    • Depression     • Diabetes mellitus (CMS/[facility])    • GERD (gastroesophageal reflux disease)    • Heart problem    • Hyperlipidemia    • Memory problem    • Sleep apnea    • Visual impairment      Past Surgical History:  Past Surgical History:   Procedure Laterality Date   • CHOLECYSTECTOMY     • CORONARY ARTERY BYPASS GRAFT       Social History:  reports that she has been smoking cigarettes. She has a 36.00 pack-year smoking history. She has never used smokeless tobacco. She reports previous alcohol use. She reports that she does not use drugs.    Family History: family history includes Cancer in her mother; Diabetes in her maternal grandmother; Hyperlipidemia in her sister; Hypertension in her sister.       Allergies:  Allergies   Allergen Reactions   • Fluoxetine Hcl Shortness Of Breath   • Levofloxacin Anaphylaxis   • Norfloxacin Shortness Of Breath     Medications:  Prior to Admission medications    Medication Sig Start Date End Date Taking? Authorizing Provider   Evolocumab 140 MG/ML solution prefilled syringe Inject 1 mL under the skin into the appropriate area as directed Every 14 (Fourteen) Days. 10/13/20  Yes Alejandra Chaudhari, DO   Fluticasone-Umeclidin-Vilant (Trelegy Ellipta) 200-62.5-25 MCG/INH aerosol powder  Inhale 1 puff Daily. 3/1/21  Yes Alejandra Chaudhari DO   furosemide (LASIX) 20 MG tablet Take 20 mg by mouth Daily. 8/28/20  Yes Provider, MD Justin   gabapentin (NEURONTIN) 300 MG capsule Take 1 capsule by mouth 3 (Three) Times a Day. 4/6/21  Yes Alejandra Chaudhari DO   glipizide (GLUCOTROL) 5 MG tablet Take 1 tablet by mouth 2 (two) times a day. 3/1/21  Yes Alejandra Chaudhari, DO   HYDROcodone-acetaminophen (Norco)  MG per tablet Take 1 tablet by mouth Every 6 (Six) Hours As Needed for Moderate Pain . 1/27/21  Yes Meg Radford, DO   ipratropium-albuterol (DUO-NEB) 0.5-2.5 mg/3 ml nebulizer Take 3 mL by nebulization Every 4 (Four) Hours As Needed for Wheezing. 10/13/20  Yes Alejandra Chaudhari  "DO Nai   lisinopril (PRINIVIL,ZESTRIL) 10 MG tablet Take 1 tablet by mouth Daily. 10/13/20  Yes Alejandra Chaudhari DO   loratadine (CLARITIN) 10 MG tablet Take 10 mg by mouth Daily As Needed. 8/28/20  Yes Justin Hernandez MD   LORazepam (Ativan) 1 MG tablet Take 1 tablet by mouth Every 8 (Eight) Hours As Needed for Anxiety. 4/6/21  Yes Alejandra Chaudhari DO   Misc. Devices (Rollator Ultra-Light) misc 1 application Daily. 10/27/20  Yes Alejandra Chaudhari DO   nitroglycerin (NITROSTAT) 0.4 MG SL tablet Place 0.4 mg under the tongue Daily As Needed. 9/28/20  Yes Justin Hernandez MD   Omega-3 Fatty Acids (fish oil) 1000 MG capsule capsule Take 1,000 mg by mouth 2 (two) times a day. 6/18/20  Yes Justin Hernandez MD   omeprazole (priLOSEC) 40 MG capsule Take 1 capsule by mouth Daily. 4/6/21  Yes Alejandra Chaudhari DO   oxyCODONE-acetaminophen (Percocet)  MG per tablet Take 1 tablet by mouth Every 8 (Eight) Hours As Needed for Moderate Pain . 4/6/21  Yes Alejandra Chaudhari DO   potassium chloride 10 MEQ CR tablet Take 1 tablet by mouth Daily. 5/3/21  Yes Alejandra Chaudhari DO   venlafaxine XR (EFFEXOR-XR) 150 MG 24 hr capsule Take 150 mg by mouth Daily. 8/28/20  Yes Justin Hernandez MD   Ventolin  (90 Base) MCG/ACT inhaler Inhale 90 mcg 2 (two) times a day. 8/28/20  Yes Justin Hernandez MD   vitamin D (ERGOCALCIFEROL) 1.25 MG (19975 UT) capsule capsule Take 50,000 Units by mouth 1 (One) Time Per Week. 9/17/20  Yes Justin Hernandez MD Xultophy 100-3.6 UNIT-MG/ML solution pen-injector subcutaneous pen Inject 30 Units as directed Every Night. 8/28/20  Yes Justin Hernandez MD       Objective     Vital Signs: BP (!) 187/96 (BP Location: Left arm, Patient Position: Sitting, Cuff Size: Adult)   Pulse 98   Temp 98.5 °F (36.9 °C) (Infrared)   Ht 165.1 cm (65\")   Wt 96.6 kg (213 lb)   SpO2 96%   Breastfeeding No   BMI 35.45 kg/m²   Physical Exam  Vitals reviewed. "   Constitutional:       Appearance: She is obese.   HENT:      Head: Normocephalic and atraumatic.      Nose: Nose normal.   Eyes:      General: No scleral icterus.     Conjunctiva/sclera: Conjunctivae normal.   Cardiovascular:      Rate and Rhythm: Normal rate and regular rhythm.      Heart sounds: Normal heart sounds.   Pulmonary:      Effort: Pulmonary effort is normal.      Breath sounds: Normal breath sounds.   Musculoskeletal:         General: No swelling or tenderness.      Cervical back: Normal range of motion and neck supple.   Skin:     General: Skin is warm and dry.   Neurological:      General: No focal deficit present.      Mental Status: She is alert.      Cranial Nerves: No cranial nerve deficit.   Psychiatric:         Behavior: Behavior normal.       Patient's Body mass index is 35.45 kg/m². indicating that she is obese (BMI >30). Obesity-related health conditions include the following: hypertension. Obesity is unchanged. BMI is is above average; BMI management plan is completed. We discussed portion control and increasing exercise..      Results Reviewed:  Glucose   Date Value Ref Range Status   06/16/2020 352 (H) 74 - 109 mg/dL Final     BUN   Date Value Ref Range Status   12/22/2020 5 (L) 6 - 24 mg/dL Final   06/16/2020 9 6 - 20 mg/dL Final     Creatinine   Date Value Ref Range Status   12/22/2020 0.81 0.57 - 1.00 mg/dL Final   06/16/2020 0.7 0.5 - 0.9 mg/dL Final     Sodium   Date Value Ref Range Status   12/22/2020 140 134 - 144 mmol/L Final   06/16/2020 134 (L) 136 - 145 mmol/L Final     Potassium   Date Value Ref Range Status   12/22/2020 3.7 3.5 - 5.2 mmol/L Final   06/16/2020 3.4 (L) 3.5 - 5.0 mmol/L Final     Chloride   Date Value Ref Range Status   12/22/2020 97 96 - 106 mmol/L Final   06/16/2020 92 (L) 98 - 111 mmol/L Final     CO2   Date Value Ref Range Status   06/16/2020 29 22 - 29 mmol/L Final     Total CO2   Date Value Ref Range Status   12/22/2020 28 20 - 29 mmol/L Final     Calcium    Date Value Ref Range Status   12/22/2020 8.4 (L) 8.7 - 10.2 mg/dL Final   06/16/2020 8.5 (L) 8.6 - 10.0 mg/dL Final     ALT (SGPT)   Date Value Ref Range Status   06/16/2020 8 5 - 33 U/L Final     AST (SGOT)   Date Value Ref Range Status   06/16/2020 10 5 - 32 U/L Final     WBC   Date Value Ref Range Status   06/16/2020 10.5 4.8 - 10.8 K/uL Final     Hematocrit   Date Value Ref Range Status   06/16/2020 39.7 37.0 - 47.0 % Final     Platelets   Date Value Ref Range Status   06/16/2020 281 130 - 400 K/uL Final     Triglycerides   Date Value Ref Range Status   06/16/2020 169 (H) 0 - 149 mg/dL Final     HDL Cholesterol   Date Value Ref Range Status   06/16/2020 51 (L) 65 - 121 mg/dL Final     Comment:     VALUES>60 MG/DL ARE ASSOCIATED WITH A DECREASED RISK OF  ATHEROSCLEROTIC CARDIOVASCULAR DISEASE     LDL Cholesterol    Date Value Ref Range Status   06/16/2020 136 <100 mg/dL Final     Comment:     <100 MG/DL=OPITIMAL    100-129 MG/DL=DESIRABLE    130-159 MG/DL BORDERLINE=INCREASED RISK OF ATHEROSCLEROTIC  CARDIOVASCULAR DISEASE    > OR = 160 MG/DL=ASSOCIATED WITH AN INCREASE RISK OF  ATHEROSCLEROTIC CARDIOVASCULAR DISEASE     Hemoglobin A1C   Date Value Ref Range Status   03/01/2021 7.3 % Final   06/16/2020 8.7 (H) 4.0 - 6.0 % Final     Comment:     HbA1c levels >6% are an indication of hyperglycemia during the preceding 2  to 3 months or longer.    HbA1c levels may reach 20% or higher in poorly controlled diabetes.  Therapeutic action is suggested at levels above 8%.    Diabetes patients with HbA1c levels below 7% meet the goal of the American  Diabetes Association.    HbA1c levels below the established reference range may indicate recent  episodes of hypoglycemia, the presence of Hb variants, or shortened lifetime  of erythrocytes.          Assessment / Plan     Assessment/Plan:  1. Long term use of drug  - ToxASSURE Select 13 (MW) - Urine, Clean Catch    2. Chronic low back pain with left-sided sciatica,  unspecified back pain laterality  - Ambulatory Referral to Pain Management    3. Moderate episode of recurrent major depressive disorder (CMS/HCC)  - desvenlafaxine (Pristiq) 100 MG 24 hr tablet; Take 1 tablet by mouth Daily.  Dispense: 30 tablet; Refill: 1  - lamoTRIgine (LaMICtal) 25 MG tablet; Take 1 tablet by mouth 2 (Two) Times a Day. 1 pill daily for a week then 2 pills daily  Dispense: 60 tablet; Refill: 1    4. Skin scars  - Maderna cream daily    Patient has labs pending from previous.     Return in about 4 weeks (around 8/17/2021) for Recheck, Next scheduled follow up. unless patient needs to be seen sooner or acute issues arise.    Code Status: Full    I have discussed the patient results/orders and and plan/recommendation with them at today's visit.      Alejandra Chaudhari, DO   07/20/2021

## 2021-07-20 NOTE — TELEPHONE ENCOUNTER
CALLED TO CONFIRM APT WITH DAYANARA CABRERA FOR 07/21/2021    PT STATES SHE IS SICK AND NEEDED APT RESCHEDULED      APT RESCHEDULED

## 2021-07-21 NOTE — TELEPHONE ENCOUNTER
Upon addressing this message, I spoke to the pharmacist who informed me that the patients insurance had made changes and would not be covering this medication anymore. The patient was completely out of the medication. I spoke to the clinic manager to help facilitate a way for the PA to be done so that the medication could be filled at such a late time of day. We were able to acquire an authorization code over the phone, that I called and gave to the pharmacist. He ran it and it went through. The patient has a follow up appointment schedule with Dr. Chaudhari.

## 2021-07-22 LAB
25(OH)D3+25(OH)D2 SERPL-MCNC: 10.3 NG/ML (ref 30–100)
ALBUMIN SERPL-MCNC: 4.5 G/DL (ref 3.8–4.9)
ALBUMIN/GLOB SERPL: 1.4 {RATIO} (ref 1.2–2.2)
ALP SERPL-CCNC: 77 IU/L (ref 48–121)
ALT SERPL-CCNC: 8 IU/L (ref 0–32)
AST SERPL-CCNC: 17 IU/L (ref 0–40)
BASOPHILS # BLD AUTO: ABNORMAL 10*3/UL
BILIRUB SERPL-MCNC: 0.3 MG/DL (ref 0–1.2)
BUN SERPL-MCNC: 8 MG/DL (ref 6–24)
BUN/CREAT SERPL: 10 (ref 9–23)
CALCIUM SERPL-MCNC: 9.4 MG/DL (ref 8.7–10.2)
CHLORIDE SERPL-SCNC: 98 MMOL/L (ref 96–106)
CHOLEST SERPL-MCNC: 273 MG/DL (ref 100–199)
CO2 SERPL-SCNC: 28 MMOL/L (ref 20–29)
CREAT SERPL-MCNC: 0.79 MG/DL (ref 0.57–1)
EOSINOPHIL # BLD AUTO: ABNORMAL 10*3/UL
EOSINOPHIL NFR BLD AUTO: ABNORMAL %
ERYTHROCYTE [DISTWIDTH] IN BLOOD BY AUTOMATED COUNT: 17.8 % (ref 11.7–15.4)
GLOBULIN SER CALC-MCNC: 3.2 G/DL (ref 1.5–4.5)
GLUCOSE SERPL-MCNC: 125 MG/DL (ref 65–99)
HBA1C MFR BLD: 7.6 % (ref 4.8–5.6)
HCT VFR BLD AUTO: 46.4 % (ref 34–46.6)
HDLC SERPL-MCNC: 53 MG/DL
HGB BLD-MCNC: 13.7 G/DL (ref 11.1–15.9)
LDLC SERPL CALC-MCNC: 189 MG/DL (ref 0–99)
LYMPHOCYTES # BLD AUTO: ABNORMAL 10*3/UL
LYMPHOCYTES NFR BLD AUTO: ABNORMAL %
MCH RBC QN AUTO: 25.1 PG (ref 26.6–33)
MCHC RBC AUTO-ENTMCNC: 29.5 G/DL (ref 31.5–35.7)
MCV RBC AUTO: 85 FL (ref 79–97)
MONOCYTES NFR BLD AUTO: ABNORMAL %
MORPHOLOGY BLD-IMP: ABNORMAL
NEUTROPHILS NFR BLD AUTO: ABNORMAL %
PLATELET # BLD AUTO: 288 X10E3/UL (ref 150–450)
POTASSIUM SERPL-SCNC: 5.1 MMOL/L (ref 3.5–5.2)
PROT SERPL-MCNC: 7.7 G/DL (ref 6–8.5)
RBC # BLD AUTO: 5.46 X10E6/UL (ref 3.77–5.28)
SODIUM SERPL-SCNC: 139 MMOL/L (ref 134–144)
TRIGL SERPL-MCNC: 167 MG/DL (ref 0–149)
VLDLC SERPL CALC-MCNC: 31 MG/DL (ref 5–40)
WBC # BLD AUTO: 10.2 X10E3/UL (ref 3.4–10.8)

## 2021-07-22 RX ORDER — CLONIDINE HYDROCHLORIDE 0.1 MG/1
0.1 TABLET ORAL 2 TIMES DAILY
Qty: 180 TABLET | Refills: 0 | OUTPATIENT
Start: 2021-07-22

## 2021-07-26 RX ORDER — ATORVASTATIN CALCIUM 10 MG/1
10 TABLET, FILM COATED ORAL DAILY
Qty: 30 TABLET | Refills: 5 | Status: SHIPPED | OUTPATIENT
Start: 2021-07-26 | End: 2021-12-20

## 2021-07-26 RX ORDER — ERGOCALCIFEROL 1.25 MG/1
50000 CAPSULE ORAL WEEKLY
Qty: 5 CAPSULE | Refills: 5 | Status: ON HOLD | OUTPATIENT
Start: 2021-07-26 | End: 2021-11-03

## 2021-07-26 RX ORDER — NITROGLYCERIN 0.4 MG/1
0.4 TABLET SUBLINGUAL DAILY PRN
Qty: 30 TABLET | Refills: 5 | Status: SHIPPED | OUTPATIENT
Start: 2021-07-26

## 2021-07-26 NOTE — PROGRESS NOTES
Called pt with the results of her labs. Pt voiced understanding. Pt states she is not taking a statin or vitamin D and will need a prescription for both of those.

## 2021-07-29 LAB — DRUGS UR: NORMAL

## 2021-08-05 ENCOUNTER — TRANSCRIBE ORDERS (OUTPATIENT)
Dept: ADMINISTRATIVE | Facility: HOSPITAL | Age: 61
End: 2021-08-05

## 2021-08-05 DIAGNOSIS — M54.10 RADICULOPATHY, UNSPECIFIED SPINAL REGION: Primary | ICD-10-CM

## 2021-08-16 DIAGNOSIS — R60.0 BILATERAL LOWER EXTREMITY EDEMA: ICD-10-CM

## 2021-08-17 RX ORDER — FUROSEMIDE 20 MG/1
TABLET ORAL
Qty: 30 TABLET | Refills: 2 | Status: SHIPPED | OUTPATIENT
Start: 2021-08-17

## 2021-08-18 DIAGNOSIS — K21.9 GASTROESOPHAGEAL REFLUX DISEASE WITHOUT ESOPHAGITIS: ICD-10-CM

## 2021-08-18 DIAGNOSIS — F33.1 MODERATE EPISODE OF RECURRENT MAJOR DEPRESSIVE DISORDER (HCC): ICD-10-CM

## 2021-08-18 NOTE — TELEPHONE ENCOUNTER
Rx Refill Note  Requested Prescriptions     Pending Prescriptions Disp Refills   • omeprazole (priLOSEC) 40 MG capsule 90 capsule 1     Sig: Take 1 capsule by mouth Daily.     Med last filled: 4/6/21   Last office visit with prescribing clinician: 7/20/2021      Next office visit with prescribing clinician: Visit date not found     PT NO SHOWED FOR APT TODAY. WANT APT BEFORE REFILL?           Rivka Sheppard RN  08/18/21, 14:58 CDT

## 2021-08-19 DIAGNOSIS — I10 ESSENTIAL HYPERTENSION: Primary | ICD-10-CM

## 2021-08-19 RX ORDER — OMEPRAZOLE 40 MG/1
40 CAPSULE, DELAYED RELEASE ORAL DAILY
Qty: 90 CAPSULE | Refills: 1 | Status: SHIPPED | OUTPATIENT
Start: 2021-08-19 | End: 2022-01-04

## 2021-08-19 NOTE — TELEPHONE ENCOUNTER
Called pt to get clarification and pt is wanting Clonidine. It is not on med list. Do you want pt seen before prescribing this?

## 2021-08-23 RX ORDER — CLONIDINE HYDROCHLORIDE 0.1 MG/1
0.1 TABLET ORAL 2 TIMES DAILY
Qty: 60 TABLET | Refills: 1 | Status: ON HOLD | OUTPATIENT
Start: 2021-08-23 | End: 2021-11-05 | Stop reason: SDUPTHER

## 2021-08-24 DIAGNOSIS — F33.1 MODERATE EPISODE OF RECURRENT MAJOR DEPRESSIVE DISORDER (HCC): ICD-10-CM

## 2021-08-24 RX ORDER — DESVENLAFAXINE 100 MG/1
100 TABLET, EXTENDED RELEASE ORAL DAILY
Qty: 30 TABLET | Refills: 1 | Status: SHIPPED | OUTPATIENT
Start: 2021-08-24 | End: 2021-10-21

## 2021-08-24 NOTE — TELEPHONE ENCOUNTER
Requested Prescriptions     Pending Prescriptions Disp Refills   • desvenlafaxine (Pristiq) 100 MG 24 hr tablet 30 tablet 1     Sig: Take 1 tablet by mouth Daily.

## 2021-08-26 ENCOUNTER — APPOINTMENT (OUTPATIENT)
Dept: MRI IMAGING | Facility: HOSPITAL | Age: 61
End: 2021-08-26

## 2021-10-04 ENCOUNTER — TELEPHONE (OUTPATIENT)
Dept: INTERNAL MEDICINE | Facility: CLINIC | Age: 61
End: 2021-10-04

## 2021-10-04 NOTE — TELEPHONE ENCOUNTER
PT needs a refill of following, EFFEXOR.  I don't see where Dr Chaudhari has prescribed.    venlafaxine XR (EFFEXOR-XR) 150 MG 24 hr capsule [28304] (Order [id number])    Pt states the following doesn't want to take  since she doesn't have anyone to watch her when she takes.  Prefers the EFFEXOR     desvenlafaxine (Pristiq) 100 MG 24 hr tablet [43140] (Order [id number])

## 2021-10-05 DIAGNOSIS — Z79.899 LONG TERM USE OF DRUG: Primary | ICD-10-CM

## 2021-10-05 NOTE — TELEPHONE ENCOUNTER
Spoke with pt,  informed her that we did not have Effexor in her medication list, and Jaimie was not comfortable writing it for her. She is happy to refill the Desvenlafaxine,  but pt states she has never even started it, she has been taking he effexor,  I asked her who she had been getting it from and she state Daryl Nagy had been refilling it for her,  but not he had denied it, since she has not seen him.    I made her a apt. To come in and be seen (she needs UDS again) she wasn't happy to come in , she wanted to do a Video or telephone visit,  due to ride,  I said well it is scheduled far enough out hopefully she can get a ride, with that much notice.  She said I guess I can.  (there was a man in the back ground saying stuff but I couldn't really understand him).  She will come in oct. 20 in afternoon.

## 2021-10-18 DIAGNOSIS — I10 ESSENTIAL HYPERTENSION: ICD-10-CM

## 2021-10-18 NOTE — TELEPHONE ENCOUNTER
Rx Refill Note  Requested Prescriptions     Pending Prescriptions Disp Refills   • lisinopril (PRINIVIL,ZESTRIL) 10 MG tablet [Pharmacy Med Name: LISINOPRIL 10 MG TAB 10 Tablet] 30 tablet 3     Sig: TAKE 1 TABLET BY MOUTH DAILY.   Med last filled:  10/13/20  Last office visit with prescribing clinician: 7/20/2021      Next office visit with prescribing clinician: 10/20/2021            Rivka Sheppard RN  10/18/21, 13:08 CDT

## 2021-10-19 RX ORDER — LISINOPRIL 10 MG/1
10 TABLET ORAL DAILY
Qty: 30 TABLET | Refills: 3 | Status: ON HOLD | OUTPATIENT
Start: 2021-10-19 | End: 2021-11-03

## 2021-10-21 ENCOUNTER — TELEMEDICINE (OUTPATIENT)
Dept: INTERNAL MEDICINE | Facility: CLINIC | Age: 61
End: 2021-10-21

## 2021-10-21 DIAGNOSIS — E11.40 TYPE 2 DIABETES MELLITUS WITH DIABETIC NEUROPATHY, WITH LONG-TERM CURRENT USE OF INSULIN (HCC): ICD-10-CM

## 2021-10-21 DIAGNOSIS — R06.2 WHEEZING: ICD-10-CM

## 2021-10-21 DIAGNOSIS — J44.1 COPD WITH EXACERBATION (HCC): ICD-10-CM

## 2021-10-21 DIAGNOSIS — T78.40XS ALLERGY, SEQUELA: ICD-10-CM

## 2021-10-21 DIAGNOSIS — Z79.4 TYPE 2 DIABETES MELLITUS WITH DIABETIC NEUROPATHY, WITH LONG-TERM CURRENT USE OF INSULIN (HCC): ICD-10-CM

## 2021-10-21 DIAGNOSIS — F33.1 MODERATE EPISODE OF RECURRENT MAJOR DEPRESSIVE DISORDER (HCC): Primary | ICD-10-CM

## 2021-10-21 DIAGNOSIS — E87.6 HYPOKALEMIA: ICD-10-CM

## 2021-10-21 PROCEDURE — 99213 OFFICE O/P EST LOW 20 MIN: CPT | Performed by: INTERNAL MEDICINE

## 2021-10-21 RX ORDER — LORATADINE 10 MG/1
10 TABLET ORAL DAILY PRN
Qty: 90 TABLET | Refills: 1 | Status: SHIPPED | OUTPATIENT
Start: 2021-10-21 | End: 2021-12-20

## 2021-10-21 RX ORDER — POTASSIUM CHLORIDE 750 MG/1
10 TABLET, FILM COATED, EXTENDED RELEASE ORAL DAILY
Qty: 30 TABLET | Refills: 1 | Status: SHIPPED | OUTPATIENT
Start: 2021-10-21 | End: 2021-12-13

## 2021-10-21 RX ORDER — VENLAFAXINE HYDROCHLORIDE 150 MG/1
300 CAPSULE, EXTENDED RELEASE ORAL DAILY
Qty: 60 CAPSULE | Refills: 1 | Status: SHIPPED | OUTPATIENT
Start: 2021-10-21 | End: 2021-12-13

## 2021-10-21 RX ORDER — AZITHROMYCIN 250 MG/1
TABLET, FILM COATED ORAL
Qty: 6 TABLET | Refills: 0 | Status: ON HOLD | OUTPATIENT
Start: 2021-10-21 | End: 2021-11-03

## 2021-10-21 RX ORDER — ALBUTEROL SULFATE 90 UG/1
1 AEROSOL, METERED RESPIRATORY (INHALATION) EVERY 4 HOURS PRN
Qty: 18 G | Refills: 5 | Status: SHIPPED | OUTPATIENT
Start: 2021-10-21 | End: 2022-06-06

## 2021-10-21 RX ORDER — METHYLPREDNISOLONE 4 MG/1
TABLET ORAL
Qty: 21 TABLET | Refills: 0 | Status: ON HOLD | OUTPATIENT
Start: 2021-10-21 | End: 2021-11-03

## 2021-10-21 NOTE — PROGRESS NOTES
Subjective     Depression/Diabetes    History of Present Illness  60 yo female who follows up for depression. Has been out of her Effexor for about 3 weeks.   She is emotional. Says that she can't eat or sleep.    Allergies have been bothering her. Nasal congestion and wheezing. Asks for refill on her MDI.   Discussed restarting her Neurontin and positive drug screen. She has not had narcotics for extended period and states that she is clean.     Patient's PMR from outside medical facility reviewed and noted.    Review of Systems   Constitutional: Negative for chills and fever.   HENT: Positive for congestion and rhinorrhea.    Respiratory: Positive for wheezing. Negative for shortness of breath.    Gastrointestinal: Negative for diarrhea and nausea.   Allergic/Immunologic: Positive for environmental allergies. Negative for immunocompromised state.   Psychiatric/Behavioral: Positive for dysphoric mood and sleep disturbance.      Otherwise complete ROS reviewed and negative except as mentioned in the HPI.    Past Medical History:   Past Medical History:   Diagnosis Date   • Anxiety    • Arthritis    • Depression    • Diabetes mellitus (HCC)    • GERD (gastroesophageal reflux disease)    • Heart problem    • Hyperlipidemia    • Memory problem    • Sleep apnea    • Visual impairment      Past Surgical History:  Past Surgical History:   Procedure Laterality Date   • CHOLECYSTECTOMY     • CORONARY ARTERY BYPASS GRAFT       Social History:  reports that she has been smoking cigarettes. She has a 36.00 pack-year smoking history. She has never used smokeless tobacco. She reports previous alcohol use. She reports that she does not use drugs.    Family History: family history includes Cancer in her mother; Diabetes in her maternal grandmother; Hyperlipidemia in her sister; Hypertension in her sister.       Allergies:  Allergies   Allergen Reactions   • Fluoxetine Hcl Shortness Of Breath   • Levofloxacin Anaphylaxis    • Norfloxacin Shortness Of Breath     Medications:  Prior to Admission medications    Medication Sig Start Date End Date Taking? Authorizing Provider   atorvastatin (Lipitor) 10 MG tablet Take 1 tablet by mouth Daily. 7/26/21   Alejandra Chaudhari DO   cloNIDine (Catapres) 0.1 MG tablet Take 1 tablet by mouth 2 (Two) Times a Day. 8/23/21   Alejandra Chaudhari DO   desvenlafaxine (Pristiq) 100 MG 24 hr tablet Take 1 tablet by mouth Daily. 8/24/21   Alejandra Chaudhari DO   Dimethicone 2 % cream Apply 2 mg topically Daily. 7/20/21   Alejandra Chaudhari DO   Evolocumab 140 MG/ML solution prefilled syringe Inject 1 mL under the skin into the appropriate area as directed Every 14 (Fourteen) Days. 10/13/20   Alejandra Chaudhari DO   Fluticasone-Umeclidin-Vilant (Trelegy Ellipta) 200-62.5-25 MCG/INH aerosol powder  Inhale 1 puff Daily. 3/1/21   Alejandra Chaudhari DO   furosemide (LASIX) 20 MG tablet Take 20 mg by mouth Daily. 8/28/20   Provider, MD Justin   gabapentin (NEURONTIN) 300 MG capsule Take 1 capsule by mouth 3 (Three) Times a Day. 4/6/21   Alejandra Chaudhari DO   glipizide (GLUCOTROL) 5 MG tablet Take 1 tablet by mouth 2 (two) times a day. 3/1/21   Alejandra Chaudhari DO   HYDROcodone-acetaminophen (Norco)  MG per tablet Take 1 tablet by mouth Every 6 (Six) Hours As Needed for Moderate Pain . 1/27/21   Meg Radford DO   ipratropium-albuterol (DUO-NEB) 0.5-2.5 mg/3 ml nebulizer Take 3 mL by nebulization Every 4 (Four) Hours As Needed for Wheezing. 10/13/20   Alejandra Chaudhari DO   lamoTRIgine (LaMICtal) 25 MG tablet Take 1 tablet by mouth 2 (Two) Times a Day. 1 pill daily for a week then 2 pills daily 7/20/21   Alejandra Chaudhari DO   lisinopril (PRINIVIL,ZESTRIL) 10 MG tablet TAKE 1 TABLET BY MOUTH DAILY. 10/19/21   Alejandra Chaudhari DO   loratadine (CLARITIN) 10 MG tablet Take 10 mg by mouth Daily As Needed. 8/28/20   Provider, MD Justin   LORazepam (Ativan) 1 MG tablet Take 1  tablet by mouth Every 8 (Eight) Hours As Needed for Anxiety. 4/6/21   Alejandra Chaudhari DO   Misc. Devices (Rollator Ultra-Light) misc 1 application Daily. 10/27/20   Alejandra Chaudhari DO   nitroglycerin (NITROSTAT) 0.4 MG SL tablet Place 1 tablet under the tongue Daily As Needed for Chest Pain. 7/26/21   Alejandra Chaudhari DO   Omega-3 Fatty Acids (fish oil) 1000 MG capsule capsule Take 1,000 mg by mouth 2 (two) times a day. 6/18/20   Justin Hernandez MD   omeprazole (priLOSEC) 40 MG capsule Take 1 capsule by mouth Daily. 8/19/21   Alejandra Chaudhari DO   oxyCODONE-acetaminophen (Percocet)  MG per tablet Take 1 tablet by mouth Every 8 (Eight) Hours As Needed for Moderate Pain . 4/6/21   Alejandra Chaudhari DO   potassium chloride 10 MEQ CR tablet Take 1 tablet by mouth Daily. 5/3/21   Alejandra Chaudhari DO   Ventolin  (90 Base) MCG/ACT inhaler Inhale 90 mcg 2 (two) times a day. 8/28/20   Justin Hernandez MD   vitamin D (ERGOCALCIFEROL) 1.25 MG (51859 UT) capsule capsule Take 1 capsule by mouth 1 (One) Time Per Week. 7/26/21   Alejandra Chaudhari DO   Xultophy 100-3.6 UNIT-MG/ML solution pen-injector subcutaneous pen Inject 30 Units as directed Every Night. 8/28/20   Justin Hernandez MD       Objective     Vital Signs: There were no vitals taken for this visit.  Physical Exam  Video visit.     Patient's There is no height or weight on file to calculate BMI. indicating that she is obese (BMI >30). Obesity-related health conditions include the following: none. Obesity is unchanged. BMI is is above average; BMI management plan is completed. We discussed portion control and increasing exercise..      Results Reviewed:  Glucose   Date Value Ref Range Status   06/16/2020 352 (H) 74 - 109 mg/dL Final     BUN   Date Value Ref Range Status   07/19/2021 8 6 - 24 mg/dL Final   06/16/2020 9 6 - 20 mg/dL Final     Creatinine   Date Value Ref Range Status   07/19/2021 0.79 0.57 - 1.00 mg/dL  Final   06/16/2020 0.7 0.5 - 0.9 mg/dL Final     Sodium   Date Value Ref Range Status   07/19/2021 139 134 - 144 mmol/L Final   06/16/2020 134 (L) 136 - 145 mmol/L Final     Potassium   Date Value Ref Range Status   07/19/2021 5.1 3.5 - 5.2 mmol/L Final   06/16/2020 3.4 (L) 3.5 - 5.0 mmol/L Final     Chloride   Date Value Ref Range Status   07/19/2021 98 96 - 106 mmol/L Final   06/16/2020 92 (L) 98 - 111 mmol/L Final     CO2   Date Value Ref Range Status   06/16/2020 29 22 - 29 mmol/L Final     Total CO2   Date Value Ref Range Status   07/19/2021 28 20 - 29 mmol/L Final     Calcium   Date Value Ref Range Status   07/19/2021 9.4 8.7 - 10.2 mg/dL Final   06/16/2020 8.5 (L) 8.6 - 10.0 mg/dL Final     ALT (SGPT)   Date Value Ref Range Status   07/19/2021 8 0 - 32 IU/L Final   06/16/2020 8 5 - 33 U/L Final     AST (SGOT)   Date Value Ref Range Status   07/19/2021 17 0 - 40 IU/L Final   06/16/2020 10 5 - 32 U/L Final     WBC   Date Value Ref Range Status   07/19/2021 10.2 3.4 - 10.8 x10E3/uL Final   06/16/2020 10.5 4.8 - 10.8 K/uL Final     Hematocrit   Date Value Ref Range Status   07/19/2021 46.4 34.0 - 46.6 % Final   06/16/2020 39.7 37.0 - 47.0 % Final     Platelets   Date Value Ref Range Status   07/19/2021 288 150 - 450 x10E3/uL Final   06/16/2020 281 130 - 400 K/uL Final     Triglycerides   Date Value Ref Range Status   07/19/2021 167 (H) 0 - 149 mg/dL Final   06/16/2020 169 (H) 0 - 149 mg/dL Final     HDL Cholesterol   Date Value Ref Range Status   07/19/2021 53 >39 mg/dL Final   06/16/2020 51 (L) 65 - 121 mg/dL Final     Comment:     VALUES>60 MG/DL ARE ASSOCIATED WITH A DECREASED RISK OF  ATHEROSCLEROTIC CARDIOVASCULAR DISEASE     LDL Cholesterol    Date Value Ref Range Status   06/16/2020 136 <100 mg/dL Final     Comment:     <100 MG/DL=OPITIMAL    100-129 MG/DL=DESIRABLE    130-159 MG/DL BORDERLINE=INCREASED RISK OF ATHEROSCLEROTIC  CARDIOVASCULAR DISEASE    > OR = 160 MG/DL=ASSOCIATED WITH AN INCREASE RISK  OF  ATHEROSCLEROTIC CARDIOVASCULAR DISEASE     LDL Chol Calc ([facility])   Date Value Ref Range Status   07/19/2021 189 (H) 0 - 99 mg/dL Final     Hemoglobin A1C   Date Value Ref Range Status   07/19/2021 7.6 (H) 4.8 - 5.6 % Final     Comment:              Prediabetes: 5.7 - 6.4           Diabetes: >6.4           Glycemic control for adults with diabetes: <7.0     03/01/2021 7.3 % Final   06/16/2020 8.7 (H) 4.0 - 6.0 % Final     Comment:     HbA1c levels >6% are an indication of hyperglycemia during the preceding 2  to 3 months or longer.    HbA1c levels may reach 20% or higher in poorly controlled diabetes.  Therapeutic action is suggested at levels above 8%.    Diabetes patients with HbA1c levels below 7% meet the goal of the American  Diabetes Association.    HbA1c levels below the established reference range may indicate recent  episodes of hypoglycemia, the presence of Hb variants, or shortened lifetime  of erythrocytes.          Assessment / Plan     Assessment/Plan:  1. Moderate episode of recurrent major depressive disorder (HCC)  - venlafaxine XR (Effexor XR) 150 MG 24 hr capsule; Take 2 capsules by mouth Daily.  Dispense: 60 capsule; Refill: 1    2. Wheezing  - methylPREDNISolone (MEDROL) 4 MG dose pack; Take as directed on package instructions.  Dispense: 21 tablet; Refill: 0    3. Allergy, sequela  - loratadine (CLARITIN) 10 MG tablet; Take 1 tablet by mouth Daily As Needed for Allergies.  Dispense: 90 tablet; Refill: 1    4. COPD with exacerbation ([facility])  - Ventolin  (90 Base) MCG/ACT inhaler; Inhale 1 puff Every 4 (Four) Hours As Needed for Wheezing.  Dispense: 18 g; Refill: 5  - azithromycin (Zithromax Z-Nasim) 250 MG tablet; Take 2 tablets by mouth on day 1, then 1 tablet daily on days 2-5  Dispense: 6 tablet; Refill: 0    5. Type 2 diabetes mellitus with diabetic neuropathy, with long-term current use of insulin ([facility])  - Patient states that her medications are UTD. Will need POC A1c on NOV. 6.  Hypokalemia  - potassium chloride 10 MEQ CR tablet; Take 1 tablet by mouth Daily.  Dispense: 30 tablet; Refill: 1        Return in about 3 months (around 1/21/2022) for Recheck, Next scheduled follow up. unless patient needs to be seen sooner or acute issues arise.    Code Status: Full    I have discussed the patient results/orders and and plan/recommendation with them at today's visit.      Alejandra Chaudhari, DO   10/21/2021

## 2021-11-02 ENCOUNTER — APPOINTMENT (OUTPATIENT)
Dept: CT IMAGING | Facility: HOSPITAL | Age: 61
End: 2021-11-02

## 2021-11-02 ENCOUNTER — HOSPITAL ENCOUNTER (OUTPATIENT)
Facility: HOSPITAL | Age: 61
Setting detail: OBSERVATION
LOS: 1 days | Discharge: HOME | End: 2021-11-05
Attending: EMERGENCY MEDICINE | Admitting: INTERNAL MEDICINE

## 2021-11-02 ENCOUNTER — APPOINTMENT (OUTPATIENT)
Dept: GENERAL RADIOLOGY | Facility: HOSPITAL | Age: 61
End: 2021-11-02

## 2021-11-02 DIAGNOSIS — I50.9 CONGESTIVE HEART FAILURE, UNSPECIFIED HF CHRONICITY, UNSPECIFIED HEART FAILURE TYPE (HCC): ICD-10-CM

## 2021-11-02 DIAGNOSIS — J96.02 ACUTE RESPIRATORY FAILURE WITH HYPOXIA AND HYPERCAPNIA (HCC): Primary | ICD-10-CM

## 2021-11-02 DIAGNOSIS — Z78.9 DECREASED ACTIVITIES OF DAILY LIVING (ADL): ICD-10-CM

## 2021-11-02 DIAGNOSIS — R68.89 DECREASED ACTIVITY TOLERANCE: ICD-10-CM

## 2021-11-02 DIAGNOSIS — J44.1 COPD EXACERBATION (HCC): ICD-10-CM

## 2021-11-02 DIAGNOSIS — J96.01 ACUTE RESPIRATORY FAILURE WITH HYPOXIA AND HYPERCAPNIA (HCC): Primary | ICD-10-CM

## 2021-11-02 PROBLEM — J81.0 ACUTE PULMONARY EDEMA (HCC): Status: ACTIVE | Noted: 2021-11-02

## 2021-11-02 PROBLEM — E66.9 OBESITY (BMI 30-39.9): Status: ACTIVE | Noted: 2021-11-02

## 2021-11-02 PROBLEM — I47.1 AVNRT (AV NODAL RE-ENTRY TACHYCARDIA) (HCC): Status: ACTIVE | Noted: 2021-11-02

## 2021-11-02 PROBLEM — I47.19 AVNRT (AV NODAL RE-ENTRY TACHYCARDIA): Status: ACTIVE | Noted: 2021-11-02

## 2021-11-02 PROBLEM — Z72.0 TOBACCO ABUSE: Status: ACTIVE | Noted: 2021-11-02

## 2021-11-02 LAB
ALBUMIN SERPL-MCNC: 3.6 G/DL (ref 3.5–5.2)
ALBUMIN/GLOB SERPL: 1.1 G/DL
ALP SERPL-CCNC: 93 U/L (ref 39–117)
ALT SERPL W P-5'-P-CCNC: 8 U/L (ref 1–33)
ANION GAP SERPL CALCULATED.3IONS-SCNC: 4 MMOL/L (ref 5–15)
APTT PPP: 34 SECONDS (ref 24.1–35)
ARTERIAL PATENCY WRIST A: POSITIVE
AST SERPL-CCNC: 11 U/L (ref 1–32)
ATMOSPHERIC PRESS: 759 MMHG
BASE EXCESS BLDA CALC-SCNC: 16.1 MMOL/L (ref 0–2)
BASOPHILS # BLD AUTO: 0.03 10*3/MM3 (ref 0–0.2)
BASOPHILS NFR BLD AUTO: 0.3 % (ref 0–1.5)
BDY SITE: ABNORMAL
BILIRUB SERPL-MCNC: 0.5 MG/DL (ref 0–1.2)
BILIRUB UR QL STRIP: NEGATIVE
BODY TEMPERATURE: 37 C
BUN SERPL-MCNC: 6 MG/DL (ref 6–20)
BUN/CREAT SERPL: 9.5 (ref 7–25)
CALCIUM SPEC-SCNC: 9.1 MG/DL (ref 8.6–10.5)
CHLORIDE SERPL-SCNC: 93 MMOL/L (ref 98–107)
CLARITY UR: CLEAR
CO2 SERPL-SCNC: 40 MMOL/L (ref 22–29)
COLOR UR: YELLOW
CREAT SERPL-MCNC: 0.63 MG/DL (ref 0.57–1)
D DIMER PPP FEU-MCNC: 0.85 MG/L (FEU) (ref 0–0.5)
D-LACTATE SERPL-SCNC: 0.8 MMOL/L (ref 0.5–2)
DEPRECATED RDW RBC AUTO: 46.5 FL (ref 37–54)
EOSINOPHIL # BLD AUTO: 0 10*3/MM3 (ref 0–0.4)
EOSINOPHIL NFR BLD AUTO: 0 % (ref 0.3–6.2)
EPAP: 6
ERYTHROCYTE [DISTWIDTH] IN BLOOD BY AUTOMATED COUNT: 15.2 % (ref 12.3–15.4)
GFR SERPL CREATININE-BSD FRML MDRD: 97 ML/MIN/1.73
GLOBULIN UR ELPH-MCNC: 3.2 GM/DL
GLUCOSE SERPL-MCNC: 212 MG/DL (ref 65–99)
GLUCOSE UR STRIP-MCNC: NEGATIVE MG/DL
HCO3 BLDA-SCNC: 43.8 MMOL/L (ref 20–26)
HCT VFR BLD AUTO: 36.9 % (ref 34–46.6)
HGB BLD-MCNC: 11 G/DL (ref 12–15.9)
HGB UR QL STRIP.AUTO: NEGATIVE
IMM GRANULOCYTES # BLD AUTO: 0.05 10*3/MM3 (ref 0–0.05)
IMM GRANULOCYTES NFR BLD AUTO: 0.5 % (ref 0–0.5)
INHALED O2 CONCENTRATION: 35 %
INR PPP: 1.06 (ref 0.91–1.09)
IPAP: 12
KETONES UR QL STRIP: NEGATIVE
LEUKOCYTE ESTERASE UR QL STRIP.AUTO: NEGATIVE
LIPASE SERPL-CCNC: 14 U/L (ref 13–60)
LYMPHOCYTES # BLD AUTO: 0.99 10*3/MM3 (ref 0.7–3.1)
LYMPHOCYTES NFR BLD AUTO: 9.8 % (ref 19.6–45.3)
Lab: ABNORMAL
Lab: ABNORMAL
MAGNESIUM SERPL-MCNC: 1.8 MG/DL (ref 1.6–2.6)
MCH RBC QN AUTO: 24.8 PG (ref 26.6–33)
MCHC RBC AUTO-ENTMCNC: 29.8 G/DL (ref 31.5–35.7)
MCV RBC AUTO: 83.1 FL (ref 79–97)
MODALITY: ABNORMAL
MONOCYTES # BLD AUTO: 0.58 10*3/MM3 (ref 0.1–0.9)
MONOCYTES NFR BLD AUTO: 5.7 % (ref 5–12)
NEUTROPHILS NFR BLD AUTO: 8.45 10*3/MM3 (ref 1.7–7)
NEUTROPHILS NFR BLD AUTO: 83.7 % (ref 42.7–76)
NITRITE UR QL STRIP: NEGATIVE
NOTIFIED BY: ABNORMAL
NOTIFIED WHO: ABNORMAL
NRBC BLD AUTO-RTO: 0 /100 WBC (ref 0–0.2)
NT-PROBNP SERPL-MCNC: 3242 PG/ML (ref 0–900)
PCO2 BLDA: 71.5 MM HG (ref 35–45)
PCO2 TEMP ADJ BLD: 71.5 MM HG (ref 35–45)
PH BLDA: 7.4 PH UNITS (ref 7.35–7.45)
PH UR STRIP.AUTO: 8.5 [PH] (ref 5–8)
PH, TEMP CORRECTED: 7.4 PH UNITS (ref 7.35–7.45)
PLATELET # BLD AUTO: 267 10*3/MM3 (ref 140–450)
PMV BLD AUTO: 9.8 FL (ref 6–12)
PO2 BLDA: 91.7 MM HG (ref 83–108)
PO2 TEMP ADJ BLD: 91.7 MM HG (ref 83–108)
POTASSIUM SERPL-SCNC: 4.1 MMOL/L (ref 3.5–5.2)
PROCALCITONIN SERPL-MCNC: 0.04 NG/ML (ref 0–0.25)
PROT SERPL-MCNC: 6.8 G/DL (ref 6–8.5)
PROT UR QL STRIP: NEGATIVE
PROTHROMBIN TIME: 13.4 SECONDS (ref 11.9–14.6)
RBC # BLD AUTO: 4.44 10*6/MM3 (ref 3.77–5.28)
SAO2 % BLDCOA: 97.6 % (ref 94–99)
SARS-COV-2 RNA PNL SPEC NAA+PROBE: NOT DETECTED
SET MECH RESP RATE: 12
SODIUM SERPL-SCNC: 137 MMOL/L (ref 136–145)
SP GR UR STRIP: 1.01 (ref 1–1.03)
TROPONIN T SERPL-MCNC: <0.01 NG/ML (ref 0–0.03)
UROBILINOGEN UR QL STRIP: ABNORMAL
VENTILATOR MODE: ABNORMAL
WBC # BLD AUTO: 10.1 10*3/MM3 (ref 3.4–10.8)

## 2021-11-02 PROCEDURE — 25010000002 AZITHROMYCIN PER 500 MG: Performed by: NURSE PRACTITIONER

## 2021-11-02 PROCEDURE — C9803 HOPD COVID-19 SPEC COLLECT: HCPCS

## 2021-11-02 PROCEDURE — 94799 UNLISTED PULMONARY SVC/PX: CPT

## 2021-11-02 PROCEDURE — P9612 CATHETERIZE FOR URINE SPEC: HCPCS

## 2021-11-02 PROCEDURE — 82803 BLOOD GASES ANY COMBINATION: CPT

## 2021-11-02 PROCEDURE — 99285 EMERGENCY DEPT VISIT HI MDM: CPT

## 2021-11-02 PROCEDURE — 0 IOPAMIDOL PER 1 ML: Performed by: EMERGENCY MEDICINE

## 2021-11-02 PROCEDURE — 94660 CPAP INITIATION&MGMT: CPT

## 2021-11-02 PROCEDURE — 25010000002 FUROSEMIDE PER 20 MG: Performed by: EMERGENCY MEDICINE

## 2021-11-02 PROCEDURE — 84145 PROCALCITONIN (PCT): CPT | Performed by: EMERGENCY MEDICINE

## 2021-11-02 PROCEDURE — 93005 ELECTROCARDIOGRAM TRACING: CPT | Performed by: EMERGENCY MEDICINE

## 2021-11-02 PROCEDURE — 71045 X-RAY EXAM CHEST 1 VIEW: CPT

## 2021-11-02 PROCEDURE — 25010000002 AZITHROMYCIN PER 500 MG: Performed by: INTERNAL MEDICINE

## 2021-11-02 PROCEDURE — 94640 AIRWAY INHALATION TREATMENT: CPT

## 2021-11-02 PROCEDURE — 96375 TX/PRO/DX INJ NEW DRUG ADDON: CPT

## 2021-11-02 PROCEDURE — G0378 HOSPITAL OBSERVATION PER HR: HCPCS

## 2021-11-02 PROCEDURE — 84484 ASSAY OF TROPONIN QUANT: CPT | Performed by: EMERGENCY MEDICINE

## 2021-11-02 PROCEDURE — 83880 ASSAY OF NATRIURETIC PEPTIDE: CPT | Performed by: EMERGENCY MEDICINE

## 2021-11-02 PROCEDURE — 85610 PROTHROMBIN TIME: CPT | Performed by: EMERGENCY MEDICINE

## 2021-11-02 PROCEDURE — 83690 ASSAY OF LIPASE: CPT | Performed by: EMERGENCY MEDICINE

## 2021-11-02 PROCEDURE — 83605 ASSAY OF LACTIC ACID: CPT | Performed by: EMERGENCY MEDICINE

## 2021-11-02 PROCEDURE — 71275 CT ANGIOGRAPHY CHEST: CPT

## 2021-11-02 PROCEDURE — 93010 ELECTROCARDIOGRAM REPORT: CPT | Performed by: INTERNAL MEDICINE

## 2021-11-02 PROCEDURE — 81003 URINALYSIS AUTO W/O SCOPE: CPT | Performed by: EMERGENCY MEDICINE

## 2021-11-02 PROCEDURE — 25010000002 ENOXAPARIN PER 10 MG: Performed by: INTERNAL MEDICINE

## 2021-11-02 PROCEDURE — 85379 FIBRIN DEGRADATION QUANT: CPT | Performed by: EMERGENCY MEDICINE

## 2021-11-02 PROCEDURE — 87635 SARS-COV-2 COVID-19 AMP PRB: CPT | Performed by: EMERGENCY MEDICINE

## 2021-11-02 PROCEDURE — 85025 COMPLETE CBC W/AUTO DIFF WBC: CPT | Performed by: EMERGENCY MEDICINE

## 2021-11-02 PROCEDURE — 87040 BLOOD CULTURE FOR BACTERIA: CPT | Performed by: EMERGENCY MEDICINE

## 2021-11-02 PROCEDURE — 25010000002 METHYLPREDNISOLONE PER 125 MG: Performed by: EMERGENCY MEDICINE

## 2021-11-02 PROCEDURE — 85730 THROMBOPLASTIN TIME PARTIAL: CPT | Performed by: EMERGENCY MEDICINE

## 2021-11-02 PROCEDURE — 36600 WITHDRAWAL OF ARTERIAL BLOOD: CPT

## 2021-11-02 PROCEDURE — 80053 COMPREHEN METABOLIC PANEL: CPT | Performed by: EMERGENCY MEDICINE

## 2021-11-02 PROCEDURE — 96372 THER/PROPH/DIAG INJ SC/IM: CPT

## 2021-11-02 PROCEDURE — 83735 ASSAY OF MAGNESIUM: CPT | Performed by: EMERGENCY MEDICINE

## 2021-11-02 PROCEDURE — 96365 THER/PROPH/DIAG IV INF INIT: CPT

## 2021-11-02 RX ORDER — DEXTROSE MONOHYDRATE 25 G/50ML
25 INJECTION, SOLUTION INTRAVENOUS
Status: DISCONTINUED | OUTPATIENT
Start: 2021-11-02 | End: 2021-11-05 | Stop reason: HOSPADM

## 2021-11-02 RX ORDER — SODIUM CHLORIDE 0.9 % (FLUSH) 0.9 %
10 SYRINGE (ML) INJECTION EVERY 12 HOURS SCHEDULED
Status: DISCONTINUED | OUTPATIENT
Start: 2021-11-02 | End: 2021-11-05 | Stop reason: HOSPADM

## 2021-11-02 RX ORDER — NICOTINE POLACRILEX 4 MG
15 LOZENGE BUCCAL
Status: DISCONTINUED | OUTPATIENT
Start: 2021-11-02 | End: 2021-11-05 | Stop reason: HOSPADM

## 2021-11-02 RX ORDER — FUROSEMIDE 10 MG/ML
40 INJECTION INTRAMUSCULAR; INTRAVENOUS
Status: COMPLETED | OUTPATIENT
Start: 2021-11-03 | End: 2021-11-03

## 2021-11-02 RX ORDER — FUROSEMIDE 10 MG/ML
40 INJECTION INTRAMUSCULAR; INTRAVENOUS ONCE
Status: COMPLETED | OUTPATIENT
Start: 2021-11-02 | End: 2021-11-02

## 2021-11-02 RX ORDER — PREDNISONE 20 MG/1
40 TABLET ORAL
Status: DISCONTINUED | OUTPATIENT
Start: 2021-11-03 | End: 2021-11-05 | Stop reason: HOSPADM

## 2021-11-02 RX ORDER — ACETAMINOPHEN 325 MG/1
650 TABLET ORAL EVERY 4 HOURS PRN
Status: DISCONTINUED | OUTPATIENT
Start: 2021-11-02 | End: 2021-11-05 | Stop reason: HOSPADM

## 2021-11-02 RX ORDER — IPRATROPIUM BROMIDE AND ALBUTEROL SULFATE 2.5; .5 MG/3ML; MG/3ML
3 SOLUTION RESPIRATORY (INHALATION)
Status: DISCONTINUED | OUTPATIENT
Start: 2021-11-02 | End: 2021-11-05 | Stop reason: HOSPADM

## 2021-11-02 RX ORDER — IPRATROPIUM BROMIDE AND ALBUTEROL SULFATE 2.5; .5 MG/3ML; MG/3ML
3 SOLUTION RESPIRATORY (INHALATION) ONCE
Status: COMPLETED | OUTPATIENT
Start: 2021-11-02 | End: 2021-11-02

## 2021-11-02 RX ORDER — ONDANSETRON 2 MG/ML
4 INJECTION INTRAMUSCULAR; INTRAVENOUS EVERY 6 HOURS PRN
Status: DISCONTINUED | OUTPATIENT
Start: 2021-11-02 | End: 2021-11-05 | Stop reason: HOSPADM

## 2021-11-02 RX ORDER — METHYLPREDNISOLONE SODIUM SUCCINATE 125 MG/2ML
125 INJECTION, POWDER, LYOPHILIZED, FOR SOLUTION INTRAMUSCULAR; INTRAVENOUS ONCE
Status: COMPLETED | OUTPATIENT
Start: 2021-11-02 | End: 2021-11-02

## 2021-11-02 RX ORDER — NITROGLYCERIN 20 MG/100ML
5-200 INJECTION INTRAVENOUS
Status: DISCONTINUED | OUTPATIENT
Start: 2021-11-02 | End: 2021-11-02

## 2021-11-02 RX ORDER — ONDANSETRON 4 MG/1
4 TABLET, FILM COATED ORAL EVERY 6 HOURS PRN
Status: DISCONTINUED | OUTPATIENT
Start: 2021-11-02 | End: 2021-11-05 | Stop reason: HOSPADM

## 2021-11-02 RX ORDER — SODIUM CHLORIDE 0.9 % (FLUSH) 0.9 %
10 SYRINGE (ML) INJECTION AS NEEDED
Status: DISCONTINUED | OUTPATIENT
Start: 2021-11-02 | End: 2021-11-05 | Stop reason: HOSPADM

## 2021-11-02 RX ADMIN — FUROSEMIDE 40 MG: 10 INJECTION INTRAMUSCULAR; INTRAVENOUS at 16:23

## 2021-11-02 RX ADMIN — IPRATROPIUM BROMIDE AND ALBUTEROL SULFATE 3 ML: 2.5; .5 SOLUTION RESPIRATORY (INHALATION) at 21:40

## 2021-11-02 RX ADMIN — ENOXAPARIN SODIUM 40 MG: 40 INJECTION SUBCUTANEOUS at 21:22

## 2021-11-02 RX ADMIN — IOPAMIDOL 100 ML: 755 INJECTION, SOLUTION INTRAVENOUS at 19:17

## 2021-11-02 RX ADMIN — METHYLPREDNISOLONE SODIUM SUCCINATE 125 MG: 125 INJECTION, POWDER, FOR SOLUTION INTRAMUSCULAR; INTRAVENOUS at 18:15

## 2021-11-02 RX ADMIN — Medication 10 ML: at 21:22

## 2021-11-02 RX ADMIN — AZITHROMYCIN MONOHYDRATE 500 MG: 500 INJECTION, POWDER, LYOPHILIZED, FOR SOLUTION INTRAVENOUS at 21:22

## 2021-11-02 RX ADMIN — NITROGLYCERIN 10 MCG/MIN: 20 INJECTION INTRAVENOUS at 16:23

## 2021-11-02 RX ADMIN — IPRATROPIUM BROMIDE AND ALBUTEROL SULFATE 3 ML: 2.5; .5 SOLUTION RESPIRATORY (INHALATION) at 17:30

## 2021-11-02 NOTE — H&P
[facility] Medicine Services  HISTORY AND PHYSICAL    Date of Admission: 11/2/2021  Primary Care Physician: Alejandra Chaudhari DO    Subjective     Chief Complaint: shortness of breath    History of Present Illness  Ms. Carney is a 61-year-old female with a past medical history of diabetes, COPD, and tobacco abuse.  Patient presents with a 2-week history of worsening weight gain, fluid retention.  Patient also describes associated symptoms including wheezing, orthopnea, and paroxysmal nocturnal dyspnea.  Patient reports that her lower extremities have been swelling, she has noticed edema in her abdominal wall, and she has had significant dyspnea on exertion.  Patient denies any fever or chills.  Patient denies any recent sick contacts.  Patient indicates that she does have a past medical history of congestive heart failure, but does not recall having an echocardiogram.  She thinks that she had congestive heart failure in approximately 2004 or 2005.    For the patient's type 2 diabetes, the patient takes glipizide and xultophy. Last A1c was 7.6 in 7/19/21. She does report a history of some neuropathy but no other complications.     For the patients tobacco abuse, she expresses no interest in quitting at this time. She is aware of the potential adverse outcomes on her health especially with her comorbities. She indicates she has tried “everything” in the past to quit smoking but did not want to ellavorate.    In regards to the patient's COPD.  She reports that she wears oxygen at night, 2 L nasal cannula, she indicates that she does not wear this routinely, and frequently forgets to put it on.  The patient has not been hospitalized recently with any acute exacerbations.  Patient takes maintenance inhalers.        Review of Systems   Constitutional: Positive for fatigue. Negative for chills and fever.   Respiratory: Positive for cough and shortness of breath.    Cardiovascular:  Positive for leg swelling. Negative for chest pain and palpitations.   Gastrointestinal: Positive for abdominal distention. Negative for abdominal pain, diarrhea, nausea and vomiting.   Genitourinary: Negative for dysuria.   Neurological: Positive for weakness.   All other systems reviewed and are negative.       Otherwise complete ROS reviewed and negative except as mentioned in the HPI.    Past Medical History:   Past Medical History:   Diagnosis Date   • Anxiety    • Arthritis    • Depression    • Diabetes mellitus (HCC)    • GERD (gastroesophageal reflux disease)    • Heart problem    • Hyperlipidemia    • Memory problem    • Sleep apnea    • Visual impairment      Past Surgical History:  Past Surgical History:   Procedure Laterality Date   • CHOLECYSTECTOMY     • CORONARY ARTERY BYPASS GRAFT       Social History:  reports that she has been smoking cigarettes. She has a 36.00 pack-year smoking history. She has never used smokeless tobacco. She reports previous alcohol use. She reports that she does not use drugs.    Family History: family history includes Cancer in her mother; Diabetes in her maternal grandmother; Hyperlipidemia in her sister; Hypertension in her sister.       Allergies:  Allergies   Allergen Reactions   • Fluoxetine Hcl Shortness Of Breath   • Levofloxacin Anaphylaxis   • Norfloxacin Shortness Of Breath       Medications:  Prior to Admission medications    Medication Sig Start Date End Date Taking? Authorizing Provider   cloNIDine (Catapres) 0.1 MG tablet Take 1 tablet by mouth 2 (Two) Times a Day. 8/23/21  Yes Alejandra Chaudhari, DO   omeprazole (priLOSEC) 40 MG capsule Take 1 capsule by mouth Daily. 8/19/21  Yes Alejandra Chaudhari DO   potassium chloride 10 MEQ CR tablet Take 1 tablet by mouth Daily. 10/21/21  Yes Alejandra Chaudhari DO   atorvastatin (Lipitor) 10 MG tablet Take 1 tablet by mouth Daily. 7/26/21   Alejandra Chaudhari DO   azithromycin (Zithromax Z-Nasim) 250 MG tablet Take 2  tablets by mouth on day 1, then 1 tablet daily on days 2-5 10/21/21   Alejandra Chaudhari DO   Dimethicone 2 % cream Apply 2 mg topically Daily. 7/20/21   Alejandra Chaudhari DO   Evolocumab 140 MG/ML solution prefilled syringe Inject 1 mL under the skin into the appropriate area as directed Every 14 (Fourteen) Days. 10/13/20   Alejandra Chaudhari DO   Fluticasone-Umeclidin-Vilant (Trelegy Ellipta) 200-62.5-25 MCG/INH aerosol powder  Inhale 1 puff Daily. 3/1/21   Alejandra Chaudhari DO   furosemide (LASIX) 20 MG tablet Take 20 mg by mouth Daily. 8/28/20   Provider, MD Justin   glipizide (GLUCOTROL) 5 MG tablet Take 1 tablet by mouth 2 (two) times a day. 3/1/21   Alejandra Chaudhari DO   ipratropium-albuterol (DUO-NEB) 0.5-2.5 mg/3 ml nebulizer Take 3 mL by nebulization Every 4 (Four) Hours As Needed for Wheezing. 10/13/20   Alejandra Chaudhari DO   lamoTRIgine (LaMICtal) 25 MG tablet Take 1 tablet by mouth 2 (Two) Times a Day. 1 pill daily for a week then 2 pills daily 7/20/21   Alejandra Chaudhari DO   lisinopril (PRINIVIL,ZESTRIL) 10 MG tablet TAKE 1 TABLET BY MOUTH DAILY. 10/19/21   Alejandra Chaudhari DO   loratadine (CLARITIN) 10 MG tablet Take 1 tablet by mouth Daily As Needed for Allergies. 10/21/21   Alejandra Chaudhari DO   methylPREDNISolone (MEDROL) 4 MG dose pack Take as directed on package instructions. 10/21/21   Alejandra Chaudhari DO   Misc. Devices (Rollator Ultra-Light) misc 1 application Daily. 10/27/20   Alejandra Chaudhari DO   nitroglycerin (NITROSTAT) 0.4 MG SL tablet Place 1 tablet under the tongue Daily As Needed for Chest Pain. 7/26/21   Alejandra Chaudhari DO   Omega-3 Fatty Acids (fish oil) 1000 MG capsule capsule Take 1,000 mg by mouth 2 (two) times a day. 6/18/20   Provider, MD Justin   oxyCODONE-acetaminophen (Percocet)  MG per tablet Take 1 tablet by mouth Every 8 (Eight) Hours As Needed for Moderate Pain . 4/6/21   Alejandra Chaudhari DO   venlafaxine XR (Effexor XR)  "150 MG 24 hr capsule Take 2 capsules by mouth Daily. 10/21/21   Alejandra Chaudhari DO   Ventolin  (90 Base) MCG/ACT inhaler Inhale 1 puff Every 4 (Four) Hours As Needed for Wheezing. 10/21/21   Alejandra Chaudhari DO   vitamin D (ERGOCALCIFEROL) 1.25 MG (69041 UT) capsule capsule Take 1 capsule by mouth 1 (One) Time Per Week. 7/26/21   Alejandra Chaudhari DO   Xultophy 100-3.6 UNIT-MG/ML solution pen-injector subcutaneous pen Inject 30 Units as directed Every Night. 8/28/20   Provider, MD LEXA Anderson have utilized all available immediate resources to obtain, update, and review the patient's current medications.    Objective     Vital Signs: /72 (BP Location: Left arm, Patient Position: Lying)   Pulse 86   Temp 98.1 °F (36.7 °C) (Oral)   Resp 20   Ht 165.1 cm (65\")   Wt 102 kg (225 lb 3.2 oz)   SpO2 98%   BMI 37.48 kg/m²   Physical Exam  Vitals and nursing note reviewed.   Constitutional:       Appearance: She is obese. She is ill-appearing.   HENT:      Head: Normocephalic and atraumatic.      Nose: No congestion or rhinorrhea.      Mouth/Throat:      Mouth: Mucous membranes are dry.      Pharynx: Oropharynx is clear.   Eyes:      General: No scleral icterus.     Conjunctiva/sclera: Conjunctivae normal.   Cardiovascular:      Rate and Rhythm: Normal rate and regular rhythm.      Heart sounds: No murmur heard.      Pulmonary:      Effort: Pulmonary effort is normal.      Breath sounds: No stridor. Rales present. No rhonchi.   Abdominal:      General: Abdomen is flat. Bowel sounds are normal. There is no distension.      Palpations: Abdomen is soft. There is no mass.      Tenderness: There is no abdominal tenderness.      Hernia: No hernia is present.   Musculoskeletal:      Right lower leg: Edema (trace) present.      Left lower leg: Edema (trace) present.   Skin:     General: Skin is warm and dry.      Coloration: Skin is pale. Skin is not jaundiced.      Comments: Anasarca; edema right " lower abdomen with some erythema (low suspicion for cellulitis)   Neurological:      General: No focal deficit present.      Mental Status: She is alert.   Psychiatric:         Mood and Affect: Mood normal.         Behavior: Behavior normal.              Results Reviewed:  Lab Results (last 24 hours)     Procedure Component Value Units Date/Time    Urinalysis With Culture If Indicated - Urine, Catheter [[id number]] Collected: 11/02/21 1815    Specimen: Urine, Catheter Updated: 11/02/21 1823    COVID-19,[facility] IN-HOUSE,Nasal Swab No Transport Media 3-4 HR TAT - Swab, Nasal Cavity [[id number]]  (Normal) Collected: 11/02/21 1557    Specimen: Swab from Nasal Cavity Updated: 11/02/21 1656     COVID19 Not Detected    Narrative:      Fact sheet for providers: https://www.fda.gov/media/722652/download     Fact sheet for patients: https://www.fda.gov/media/644752/download    Test performed by PCR.    Consider negative results in combination with clinical observations, patient history, and epidemiological information.  Fact sheet for providers: https://www.fda.gov/media/217736/download     Fact sheet for patients: https://www.fda.gov/media/737413/download    Test performed by PCR.    Consider negative results in combination with clinical observations, patient history, and epidemiological information.    Blood Gas, Arterial - [[id number]]  (Abnormal) Collected: 11/02/21 1635    Specimen: Arterial Blood Updated: 11/02/21 1642     Site Right Radial     David's Test Positive     pH, Arterial 7.396 pH units      pCO2, Arterial 71.5 mm Hg      Comment: 86 Value above critical limit        pO2, Arterial 91.7 mm Hg      HCO3, Arterial 43.8 mmol/L      Comment: 83 Value above reference range        Base Excess, Arterial 16.1 mmol/L      Comment: 83 Value above reference range        O2 Saturation, Arterial 97.6 %      Temperature 37.0 C      Barometric Pressure for Blood Gas 759 mmHg      Modality BiPap     FIO2 35 %      Ventilator  "Mode BiPAP     Set [facility] Resp Rate 12.0     IPAP 12     Comment: Meter: [id number]     :  [id number]        EPAP 6     Notified Who Robert Gramlisch RN     Notified By [id number]     Notified Time 11/02/2021 16:51     Collected by [id number]     pCO2, Temperature Corrected 71.5 mm Hg      pH, Temp Corrected 7.396 pH Units      pO2, Temperature Corrected 91.7 mm Hg     Procalcitonin [[id number]]  (Normal) Collected: 11/02/21 1557    Specimen: Blood Updated: 11/02/21 1637     Procalcitonin 0.04 ng/mL     Narrative:      As a Marker for Sepsis (Non-Neonates):     1. <0.5 ng/mL represents a low risk of severe sepsis and/or septic shock.  2. >2 ng/mL represents a high risk of severe sepsis and/or septic shock.    As a Marker for Lower Respiratory Tract Infections that require antibiotic therapy:  PCT on Admission     Antibiotic Therapy             6-12 Hrs later  >0.5                          Strongly Recommended            >0.25 - <0.5             Recommended  0.1 - 0.25                  Discouraged                       Remeasure/reassess PCT  <0.1                         Strongly Discouraged         Remeasure/reassess PCT      As 28 day mortality risk marker: \"Change in Procalcitonin Result\" (>80% or <=80%) if Day 0 (or Day 1) and Day 4 values are available. Refer to [email or web address]-pct-[email or web address]/    Change in PCT <=80 %   A decrease of PCT levels below or equal to 80% defines a positive change in PCT test result representing a higher risk for 28-day all-cause mortality of patients diagnosed with severe sepsis or septic shock.    Change in PCT >80 %   A decrease of PCT levels of more than 80% defines a negative change in PCT result representing a lower risk for 28-day all-cause mortality of patients diagnosed with severe sepsis or septic shock.                Comprehensive Metabolic Panel [[id number]]  (Abnormal) Collected: 11/02/21 1557    Specimen: Blood Updated: 11/02/21 1633     Glucose 212 mg/dL      BUN " 6 mg/dL      Creatinine 0.63 mg/dL      Sodium 137 mmol/L      Potassium 4.1 mmol/L      Chloride 93 mmol/L      CO2 40.0 mmol/L      Calcium 9.1 mg/dL      Total Protein 6.8 g/dL      Albumin 3.60 g/dL      ALT (SGPT) 8 U/L      AST (SGOT) 11 U/L      Alkaline Phosphatase 93 U/L      Total Bilirubin 0.5 mg/dL      eGFR Non African Amer 97 mL/min/1.73      Globulin 3.2 gm/dL      A/G Ratio 1.1 g/dL      BUN/Creatinine Ratio 9.5     Anion Gap 4.0 mmol/L     Narrative:      GFR Normal >60  Chronic Kidney Disease <60  Kidney Failure <15      Troponin [[id number]]  (Normal) Collected: 11/02/21 1557    Specimen: Blood Updated: 11/02/21 1631     Troponin T <0.010 ng/mL     Narrative:      Troponin T Reference Range:  <= 0.03 ng/mL-   Negative for AMI  >0.03 ng/mL-     Abnormal for myocardial necrosis.  Clinicians would have to utilize clinical acumen, EKG, Troponin and serial changes to determine if it is an Acute Myocardial Infarction or myocardial injury due to an underlying chronic condition.       Results may be falsely decreased if patient taking Biotin.      BNP [[id number]]  (Abnormal) Collected: 11/02/21 1557    Specimen: Blood Updated: 11/02/21 1630     proBNP 3,242.0 pg/mL     Narrative:      Among patients with dyspnea, NT-proBNP is highly sensitive for the detection of acute congestive heart failure. In addition NT-proBNP of <300 pg/ml effectively rules out acute congestive heart failure with 99% negative predictive value.    Results may be falsely decreased if patient taking Biotin.      Lactic Acid, Plasma [[id number]]  (Normal) Collected: 11/02/21 1557    Specimen: Blood Updated: 11/02/21 1630     Lactate 0.8 mmol/L     Lipase [[id number]]  (Normal) Collected: 11/02/21 1557    Specimen: Blood Updated: 11/02/21 1629     Lipase 14 U/L     Magnesium [[id number]]  (Normal) Collected: 11/02/21 1557    Specimen: Blood Updated: 11/02/21 1628     Magnesium 1.8 mg/dL     D-dimer, Quantitative [[id number]]  (Abnormal)  Collected: 11/02/21 1557    Specimen: Blood Updated: 11/02/21 1623     D-Dimer, Quantitative 0.85 mg/L (FEU)     Narrative:      Reference Range is 0-0.50 mg/L FEU. However, results <0.50 mg/L FEU tends to rule out DVT or PE. Results >0.50 mg/L FEU are not useful in predicting absence or presence of DVT or PE.      Protime-INR [[id number]]  (Normal) Collected: 11/02/21 1557    Specimen: Blood Updated: 11/02/21 1623     Protime 13.4 Seconds      INR 1.06    aPTT [[id number]]  (Normal) Collected: 11/02/21 1557    Specimen: Blood Updated: 11/02/21 1623     PTT 34.0 seconds     CBC & Differential [[id number]]  (Abnormal) Collected: 11/02/21 1557    Specimen: Blood Updated: 11/02/21 1613    Narrative:      The following orders were created for panel order CBC & Differential.  Procedure                               Abnormality         Status                     ---------                               -----------         ------                     CBC Auto Differential[[id number]]        Abnormal            Final result                 Please view results for these tests on the individual orders.    CBC Auto Differential [[id number]]  (Abnormal) Collected: 11/02/21 1557    Specimen: Blood Updated: 11/02/21 1613     WBC 10.10 10*3/mm3      RBC 4.44 10*6/mm3      Hemoglobin 11.0 g/dL      Hematocrit 36.9 %      MCV 83.1 fL      MCH 24.8 pg      MCHC 29.8 g/dL      RDW 15.2 %      RDW-SD 46.5 fl      MPV 9.8 fL      Platelets 267 10*3/mm3      Neutrophil % 83.7 %      Lymphocyte % 9.8 %      Monocyte % 5.7 %      Eosinophil % 0.0 %      Basophil % 0.3 %      Immature Grans % 0.5 %      Neutrophils, Absolute 8.45 10*3/mm3      Lymphocytes, Absolute 0.99 10*3/mm3      Monocytes, Absolute 0.58 10*3/mm3      Eosinophils, Absolute 0.00 10*3/mm3      Basophils, Absolute 0.03 10*3/mm3      Immature Grans, Absolute 0.05 10*3/mm3      nRBC 0.0 /100 WBC     Blood Culture - Blood, Hand, Right [[id number]] Collected: 11/02/21 1550     Specimen: Blood from Hand, Right Updated: 11/02/21 1610    Blood Culture - Blood, Arm, Right [[id number]] Collected: 11/02/21 1550    Specimen: Blood from Arm, Right Updated: 11/02/21 1610        Imaging Results (Last 24 Hours)     Procedure Component Value Units Date/Time    XR Chest 1 View [[id number]] Collected: 11/02/21 1555     Updated: 11/02/21 1559    Narrative:      EXAMINATION: XR CHEST 1 VW-     11/2/2021 3:54 PM CDT     HISTORY: Weakness     1 view chest x-ray.     Remote comparison from 10/28/2015.     Hypoaeration.  CABG changes.     Central vascular prominence suspicious for early failure.     Chronic right diaphragm elevation.     No pneumothorax.     Summary:  1. Central vascular and interstitial prominence suspicious for early  heart failure.  This report was finalized on 11/02/2021 15:56 by Dr. Pete West MD.        I have personally reviewed and interpreted the radiology studies and ECG obtained at time of admission.     Assessment / Plan     Assessment:   Active Hospital Problems    Diagnosis    • Acute respiratory failure with hypoxia and hypercapnia (HCC)    • Acute congestive heart failure (HCC)      Plan:   Plan:    Admit patient to telemetry    Patient for some reason was started on nitroglycerin drip and placed on BiPAP in the emergency department.  Do not know as though nitroglycerin will be any of assistance at this time, this patient does not have any noted systolic heart failure, and her blood pressure is not elevated.  Patient was saturating in the mid 90% on 2 L nasal cannula prior to BiPAP, do not think that she needs the BiPAP at this juncture.  We will transition to regular nasal cannula.    Suspect that the patient has volume overload in addition to COPD exacerbation based on clinical presentation as well as the signs and symptoms.    For the patient's COPD exacerbation, she was given Solu-Medrol 125 mg in the emergency department.  We will continue prednisone 40 mg daily.   Azithromycin 500 mg x 3 days.  Bronchodilators with DuoNeb's.    For the patient's volume overload, she received Lasix 40 mg in the emergency department.  I will continue 40 mg IV twice daily.  Echocardiogram.  Strict input and output.  Daily weights.    For the patient's diabetes, we will check a hemoglobin A1c.  Hold her home medications.  Sliding scale insulin with before every meal/nightly Accu-Cheks.         Code Status/Advanced Care Plan: DNR/DNI    The patient's surrogate decision maker is Zay Carr (Daughter).     I discussed my findings and recommendations with the patient and bedside RN .    Estimated length of stay is >2 days.     The patient was seen and examined by me on 11/02/2021 at 1828.    Electronically signed by Vinnie Carballo MD, 11/02/21, 18:28 CDT.

## 2021-11-02 NOTE — ED PROVIDER NOTES
Subjective   Patient is a 61-year-old came here, shortness of breath cough and congestion no other complaints associate with this exam very poor historian      Shortness of Breath  Severity:  Moderate  Onset quality:  Gradual  Timing:  Constant  Progression:  Worsening  Chronicity:  Recurrent  Context: activity    Context: not animal exposure, not emotional upset, not occupational exposure, not pollens, not strong odors and not URI    Relieved by:  Nothing  Worsened by:  Deep breathing and exertion  Ineffective treatments:  None tried  Associated symptoms: cough and wheezing    Associated symptoms: no abdominal pain, no chest pain, no claudication, no diaphoresis, no ear pain, no fever, no headaches, no hemoptysis, no neck pain, no rash, no sore throat, no sputum production, no syncope and no vomiting    Risk factors: obesity    Risk factors: no recent alcohol use, no hx of cancer, no hx of PE/DVT and no tobacco use        Review of Systems   Constitutional: Negative.  Negative for activity change, appetite change, chills, diaphoresis, fatigue and fever.   HENT: Negative for congestion, drooling, ear pain, facial swelling, hearing loss, sinus pressure and sore throat.    Eyes: Negative.  Negative for discharge.   Respiratory: Positive for cough, shortness of breath and wheezing. Negative for hemoptysis and sputum production.    Cardiovascular: Negative for chest pain, claudication and syncope.   Gastrointestinal: Negative for abdominal distention, abdominal pain, blood in stool, diarrhea, nausea and vomiting.   Endocrine: Negative.  Negative for cold intolerance, heat intolerance, polydipsia, polyphagia and polyuria.   Genitourinary: Negative.  Negative for dysuria, flank pain and urgency.   Musculoskeletal: Negative.  Negative for arthralgias, back pain, myalgias, neck pain and neck stiffness.   Skin: Negative.  Negative for color change, pallor and rash.   Allergic/Immunologic: Negative.    Neurological: Negative.   Negative for dizziness, seizures, speech difficulty, weakness, numbness and headaches.   Hematological: Negative.  Negative for adenopathy.   All other systems reviewed and are negative.      Past Medical History:   Diagnosis Date   • Anxiety    • Arthritis    • Depression    • Diabetes mellitus (HCC)    • GERD (gastroesophageal reflux disease)    • Heart problem    • Hyperlipidemia    • Memory problem    • Sleep apnea    • Visual impairment        Allergies   Allergen Reactions   • Fluoxetine Hcl Shortness Of Breath   • Levofloxacin Anaphylaxis   • Norfloxacin Shortness Of Breath       Past Surgical History:   Procedure Laterality Date   • CHOLECYSTECTOMY     • CORONARY ARTERY BYPASS GRAFT         Family History   Problem Relation Age of Onset   • Cancer Mother    • Hyperlipidemia Sister    • Hypertension Sister    • Diabetes Maternal Grandmother        Social History     Socioeconomic History   • Marital status:    Tobacco Use   • Smoking status: Current Every Day Smoker     Packs/day: 1.00     Years: 36.00     Pack years: 36.00     Types: Cigarettes   • Smokeless tobacco: Never Used   Vaping Use   • Vaping Use: Never used   Substance and Sexual Activity   • Alcohol use: Not Currently   • Drug use: Never           Objective   Physical Exam  Vitals and nursing note reviewed. Exam conducted with a chaperone present.   Constitutional:       General: She is not in acute distress.     Appearance: Normal appearance. She is well-developed. She is ill-appearing. She is not toxic-appearing or diaphoretic.   HENT:      Head: Normocephalic and atraumatic.      Right Ear: External ear normal.      Nose: Nose normal.      Mouth/Throat:      Mouth: Mucous membranes are moist.   Eyes:      Conjunctiva/sclera: Conjunctivae normal.      Pupils: Pupils are equal, round, and reactive to light.   Cardiovascular:      Rate and Rhythm: Regular rhythm. Tachycardia present.      Chest Wall: PMI is not displaced.      Pulses:  Normal pulses. No decreased pulses.      Heart sounds: Normal heart sounds. No murmur heard.      Pulmonary:      Effort: Pulmonary effort is normal. Tachypnea present. No accessory muscle usage or respiratory distress.      Breath sounds: No stridor. Examination of the right-lower field reveals decreased breath sounds, rhonchi and rales. Examination of the left-lower field reveals decreased breath sounds, rhonchi and rales. Decreased breath sounds, rhonchi and rales present. No wheezing.   Chest:      Chest wall: No tenderness or crepitus.   Abdominal:      General: Bowel sounds are normal. There is no distension.      Palpations: Abdomen is soft.      Tenderness: There is no abdominal tenderness.   Musculoskeletal:         General: No swelling or tenderness. Normal range of motion.      Cervical back: Normal range of motion and neck supple. No rigidity.      Right lower leg: Edema present.      Left lower leg: Edema present.      Comments: Lower extremity exam bilaterally is unremarkable.  There is no right or left calf tenderness .  There is no palpable venous cord.  No obvious difference in the size of the legs.  No pitting edema.  The dorsalis pedis and posterior tibial femoral and popliteal pulses are palpable and +2 bilaterally.  Homans sign is negative   Skin:     General: Skin is warm.      Capillary Refill: Capillary refill takes less than 2 seconds.      Coloration: Skin is not jaundiced.      Findings: No erythema or rash.   Neurological:      General: No focal deficit present.      Mental Status: She is alert and oriented to person, place, and time. Mental status is at baseline.      Cranial Nerves: No cranial nerve deficit.      Motor: No weakness.      Coordination: Coordination normal.      Deep Tendon Reflexes: Reflexes are normal and symmetric. Reflexes normal.   Psychiatric:         Mood and Affect: Mood normal.         Procedures           ED Course  ED Course as of 11/02/21 1723   Tue Nov 02,  2021 1720 Patient came in with shortness of breath was given some neb treatments I gave her diuretics and nitroglycerin was initiated to reduce her preload chest with consent CHF placed on BiPAP with the above her condition has improved her respiration is improved tachycardia has resolved and she seems to be doing better is will be admitted to the hospital service.  Patient has been informed about this. [TS]   1721 Her abdominal wall on the right side is more to read it could be just because a dependency on that side I do not see any obvious evidence of cellulitis and her pro J Luis level is negative. [TS]   1723 I have discussed the findings with the patient at length. [TS]      ED Course User Index  [TS] Abdoulaye Ayala MD                                           MDM  Number of Diagnoses or Management Options  Diagnosis management comments: Differential Diagnosis:  I considered pulmonary etiology, asthma, chronic obstructive pulmonary disease, pneumonia, pulmonary embolism, adult respiratory distress syndrome, pneumothorax, pleural effusion, pulmonary fibrosis, cardiac etiology, congestive heart failure, myocardial infarction, metabolic etiology, diabetic ketoacidosis, uremia, acidosis, sepsis, anemia, drug related etiology, hyperventilation and CNS disease as a possible cause of dyspnea in this patient. This is a partial list of diagnoses considered.            Amount and/or Complexity of Data Reviewed  Clinical lab tests: reviewed and ordered  Tests in the radiology section of CPT®: ordered and reviewed  Tests in the medicine section of CPT®: ordered and reviewed    Risk of Complications, Morbidity, and/or Mortality  Presenting problems: moderate  Diagnostic procedures: moderate  Management options: moderate        Final diagnoses:   Acute respiratory failure with hypoxia and hypercapnia (HCC)   Congestive heart failure, unspecified HF chronicity, unspecified heart failure type (HCC)   COPD exacerbation (HCC)        ED Disposition  ED Disposition     ED Disposition Condition Comment    Decision to Admit  Level of Care: Critical Care [6]   Diagnosis: Acute respiratory failure with hypoxia and hypercapnia (HCC) [0053195]   Admitting Physician: ANNELISE NINA [[id number]]   Attending Physician: ANNELISE NINA [[id number]]   Certification: I Certify That Inpatient Hospital Services Are Medically Necessary For Greater Than 2 Midnights            No follow-up provider specified.       Medication List      No changes were made to your prescriptions during this visit.          Abdoulaye Ayala MD  11/02/21 1723       Abdoulaye Ayala MD  11/02/21 1723

## 2021-11-03 ENCOUNTER — APPOINTMENT (OUTPATIENT)
Dept: CARDIOLOGY | Facility: HOSPITAL | Age: 61
End: 2021-11-03

## 2021-11-03 PROBLEM — I50.33 ACUTE ON CHRONIC DIASTOLIC CHF (CONGESTIVE HEART FAILURE) (HCC): Status: ACTIVE | Noted: 2021-11-03

## 2021-11-03 LAB
ANION GAP SERPL CALCULATED.3IONS-SCNC: 6 MMOL/L (ref 5–15)
BASOPHILS # BLD AUTO: 0.02 10*3/MM3 (ref 0–0.2)
BASOPHILS NFR BLD AUTO: 0.2 % (ref 0–1.5)
BH CV ECHO MEAS - AO MAX PG (FULL): 1.5 MMHG
BH CV ECHO MEAS - AO MAX PG: 10 MMHG
BH CV ECHO MEAS - AO MEAN PG (FULL): 2 MMHG
BH CV ECHO MEAS - AO MEAN PG: 6 MMHG
BH CV ECHO MEAS - AO ROOT AREA (BSA CORRECTED): 1.4
BH CV ECHO MEAS - AO ROOT AREA: 6.6 CM^2
BH CV ECHO MEAS - AO ROOT DIAM: 2.9 CM
BH CV ECHO MEAS - AO V2 MAX: 158 CM/SEC
BH CV ECHO MEAS - AO V2 MEAN: 109 CM/SEC
BH CV ECHO MEAS - AO V2 VTI: 32.5 CM
BH CV ECHO MEAS - AVA(I,A): 2.8 CM^2
BH CV ECHO MEAS - AVA(I,D): 2.8 CM^2
BH CV ECHO MEAS - AVA(V,A): 2.9 CM^2
BH CV ECHO MEAS - AVA(V,D): 2.9 CM^2
BH CV ECHO MEAS - BSA(HAYCOCK): 2.2 M^2
BH CV ECHO MEAS - BSA: 2.1 M^2
BH CV ECHO MEAS - BZI_BMI: 37.4 KILOGRAMS/M^2
BH CV ECHO MEAS - BZI_METRIC_HEIGHT: 165.1 CM
BH CV ECHO MEAS - BZI_METRIC_WEIGHT: 102.1 KG
BH CV ECHO MEAS - EDV(CUBED): 195.1 ML
BH CV ECHO MEAS - EDV(MOD-SP2): 151 ML
BH CV ECHO MEAS - EDV(MOD-SP4): 162 ML
BH CV ECHO MEAS - EDV(TEICH): 166.6 ML
BH CV ECHO MEAS - EF(CUBED): 65.2 %
BH CV ECHO MEAS - EF(MOD-SP2): 47.3 %
BH CV ECHO MEAS - EF(MOD-SP4): 50.6 %
BH CV ECHO MEAS - EF(TEICH): 56 %
BH CV ECHO MEAS - ESV(CUBED): 67.9 ML
BH CV ECHO MEAS - ESV(MOD-SP2): 79.6 ML
BH CV ECHO MEAS - ESV(MOD-SP4): 80 ML
BH CV ECHO MEAS - ESV(TEICH): 73.4 ML
BH CV ECHO MEAS - FS: 29.7 %
BH CV ECHO MEAS - IVS/LVPW: 1.3
BH CV ECHO MEAS - IVSD: 0.97 CM
BH CV ECHO MEAS - LA DIMENSION: 4.9 CM
BH CV ECHO MEAS - LA/AO: 1.7
BH CV ECHO MEAS - LAT PEAK E' VEL: 9 CM/SEC
BH CV ECHO MEAS - LV DIASTOLIC VOL/BSA (35-75): 77.9 ML/M^2
BH CV ECHO MEAS - LV MASS(C)D: 192.9 GRAMS
BH CV ECHO MEAS - LV MASS(C)DI: 92.8 GRAMS/M^2
BH CV ECHO MEAS - LV MAX PG: 8.5 MMHG
BH CV ECHO MEAS - LV MEAN PG: 4 MMHG
BH CV ECHO MEAS - LV SYSTOLIC VOL/BSA (12-30): 38.5 ML/M^2
BH CV ECHO MEAS - LV V1 MAX: 146 CM/SEC
BH CV ECHO MEAS - LV V1 MEAN: 90.4 CM/SEC
BH CV ECHO MEAS - LV V1 VTI: 28.9 CM
BH CV ECHO MEAS - LVIDD: 5.8 CM
BH CV ECHO MEAS - LVIDS: 4.1 CM
BH CV ECHO MEAS - LVLD AP2: 8 CM
BH CV ECHO MEAS - LVLD AP4: 8.8 CM
BH CV ECHO MEAS - LVLS AP2: 7.7 CM
BH CV ECHO MEAS - LVLS AP4: 7.3 CM
BH CV ECHO MEAS - LVOT AREA (M): 3.1 CM^2
BH CV ECHO MEAS - LVOT AREA: 3.1 CM^2
BH CV ECHO MEAS - LVOT DIAM: 2 CM
BH CV ECHO MEAS - LVPWD: 0.75 CM
BH CV ECHO MEAS - MED PEAK E' VEL: 7.62 CM/SEC
BH CV ECHO MEAS - MR MAX PG: 55.1 MMHG
BH CV ECHO MEAS - MR MAX VEL: 371 CM/SEC
BH CV ECHO MEAS - MV A MAX VEL: 131 CM/SEC
BH CV ECHO MEAS - MV DEC TIME: 0.22 SEC
BH CV ECHO MEAS - MV E MAX VEL: 138 CM/SEC
BH CV ECHO MEAS - MV E/A: 1.1
BH CV ECHO MEAS - SI(AO): 103.2 ML/M^2
BH CV ECHO MEAS - SI(CUBED): 61.2 ML/M^2
BH CV ECHO MEAS - SI(LVOT): 43.7 ML/M^2
BH CV ECHO MEAS - SI(MOD-SP2): 34.3 ML/M^2
BH CV ECHO MEAS - SI(MOD-SP4): 39.4 ML/M^2
BH CV ECHO MEAS - SI(TEICH): 44.8 ML/M^2
BH CV ECHO MEAS - SV(AO): 214.7 ML
BH CV ECHO MEAS - SV(CUBED): 127.2 ML
BH CV ECHO MEAS - SV(LVOT): 90.8 ML
BH CV ECHO MEAS - SV(MOD-SP2): 71.4 ML
BH CV ECHO MEAS - SV(MOD-SP4): 82 ML
BH CV ECHO MEAS - SV(TEICH): 93.2 ML
BH CV ECHO MEAS - TR MAX VEL: 240 CM/SEC
BH CV ECHO MEASUREMENTS AVERAGE E/E' RATIO: 16.61
BUN SERPL-MCNC: 8 MG/DL (ref 6–20)
BUN/CREAT SERPL: 12.3 (ref 7–25)
CALCIUM SPEC-SCNC: 9 MG/DL (ref 8.6–10.5)
CHLORIDE SERPL-SCNC: 89 MMOL/L (ref 98–107)
CO2 SERPL-SCNC: 37 MMOL/L (ref 22–29)
CREAT SERPL-MCNC: 0.65 MG/DL (ref 0.57–1)
DEPRECATED RDW RBC AUTO: 44.4 FL (ref 37–54)
EOSINOPHIL # BLD AUTO: 0 10*3/MM3 (ref 0–0.4)
EOSINOPHIL NFR BLD AUTO: 0 % (ref 0.3–6.2)
ERYTHROCYTE [DISTWIDTH] IN BLOOD BY AUTOMATED COUNT: 15.3 % (ref 12.3–15.4)
GFR SERPL CREATININE-BSD FRML MDRD: 93 ML/MIN/1.73
GLUCOSE BLDC GLUCOMTR-MCNC: 209 MG/DL (ref 70–130)
GLUCOSE BLDC GLUCOMTR-MCNC: 228 MG/DL (ref 70–130)
GLUCOSE BLDC GLUCOMTR-MCNC: 275 MG/DL (ref 70–130)
GLUCOSE BLDC GLUCOMTR-MCNC: 283 MG/DL (ref 70–130)
GLUCOSE SERPL-MCNC: 321 MG/DL (ref 65–99)
HBA1C MFR BLD: 8.3 % (ref 4.8–5.6)
HCT VFR BLD AUTO: 36.9 % (ref 34–46.6)
HGB BLD-MCNC: 10.9 G/DL (ref 12–15.9)
IMM GRANULOCYTES # BLD AUTO: 0.04 10*3/MM3 (ref 0–0.05)
IMM GRANULOCYTES NFR BLD AUTO: 0.4 % (ref 0–0.5)
LEFT ATRIUM VOLUME INDEX: 35.4 ML/M2
LEFT ATRIUM VOLUME: 73.7 CM3
LYMPHOCYTES # BLD AUTO: 0.43 10*3/MM3 (ref 0.7–3.1)
LYMPHOCYTES NFR BLD AUTO: 4.2 % (ref 19.6–45.3)
MAGNESIUM SERPL-MCNC: 1.6 MG/DL (ref 1.6–2.6)
MAXIMAL PREDICTED HEART RATE: 161 BPM
MCH RBC QN AUTO: 23.6 PG (ref 26.6–33)
MCHC RBC AUTO-ENTMCNC: 29.5 G/DL (ref 31.5–35.7)
MCV RBC AUTO: 80 FL (ref 79–97)
MONOCYTES # BLD AUTO: 0.06 10*3/MM3 (ref 0.1–0.9)
MONOCYTES NFR BLD AUTO: 0.6 % (ref 5–12)
NEUTROPHILS NFR BLD AUTO: 9.6 10*3/MM3 (ref 1.7–7)
NEUTROPHILS NFR BLD AUTO: 94.6 % (ref 42.7–76)
NRBC BLD AUTO-RTO: 0 /100 WBC (ref 0–0.2)
PHOSPHATE SERPL-MCNC: 3.6 MG/DL (ref 2.5–4.5)
PLATELET # BLD AUTO: 263 10*3/MM3 (ref 140–450)
PMV BLD AUTO: 10.3 FL (ref 6–12)
POTASSIUM SERPL-SCNC: 4.2 MMOL/L (ref 3.5–5.2)
QT INTERVAL: 414 MS
QTC INTERVAL: 480 MS
RBC # BLD AUTO: 4.61 10*6/MM3 (ref 3.77–5.28)
SODIUM SERPL-SCNC: 132 MMOL/L (ref 136–145)
STRESS TARGET HR: 137 BPM
TSH SERPL DL<=0.05 MIU/L-ACNC: 1.82 UIU/ML (ref 0.27–4.2)
WBC # BLD AUTO: 10.15 10*3/MM3 (ref 3.4–10.8)

## 2021-11-03 PROCEDURE — 25010000002 PERFLUTREN 6.52 MG/ML SUSPENSION: Performed by: INTERNAL MEDICINE

## 2021-11-03 PROCEDURE — 25010000002 AZITHROMYCIN PER 500 MG: Performed by: INTERNAL MEDICINE

## 2021-11-03 PROCEDURE — 94799 UNLISTED PULMONARY SVC/PX: CPT

## 2021-11-03 PROCEDURE — 93306 TTE W/DOPPLER COMPLETE: CPT | Performed by: EMERGENCY MEDICINE

## 2021-11-03 PROCEDURE — 93306 TTE W/DOPPLER COMPLETE: CPT

## 2021-11-03 PROCEDURE — 63710000001 INSULIN LISPRO (HUMAN) PER 5 UNITS: Performed by: NURSE PRACTITIONER

## 2021-11-03 PROCEDURE — G0378 HOSPITAL OBSERVATION PER HR: HCPCS

## 2021-11-03 PROCEDURE — 83735 ASSAY OF MAGNESIUM: CPT | Performed by: INTERNAL MEDICINE

## 2021-11-03 PROCEDURE — 96372 THER/PROPH/DIAG INJ SC/IM: CPT

## 2021-11-03 PROCEDURE — 25010000002 FUROSEMIDE PER 20 MG: Performed by: INTERNAL MEDICINE

## 2021-11-03 PROCEDURE — 83036 HEMOGLOBIN GLYCOSYLATED A1C: CPT | Performed by: INTERNAL MEDICINE

## 2021-11-03 PROCEDURE — 84100 ASSAY OF PHOSPHORUS: CPT | Performed by: INTERNAL MEDICINE

## 2021-11-03 PROCEDURE — 63710000001 PREDNISONE PER 1 MG: Performed by: INTERNAL MEDICINE

## 2021-11-03 PROCEDURE — 80048 BASIC METABOLIC PNL TOTAL CA: CPT | Performed by: INTERNAL MEDICINE

## 2021-11-03 PROCEDURE — 25010000002 ENOXAPARIN PER 10 MG: Performed by: INTERNAL MEDICINE

## 2021-11-03 PROCEDURE — 82962 GLUCOSE BLOOD TEST: CPT

## 2021-11-03 PROCEDURE — 85025 COMPLETE CBC W/AUTO DIFF WBC: CPT | Performed by: INTERNAL MEDICINE

## 2021-11-03 PROCEDURE — 25010000002 FUROSEMIDE PER 20 MG: Performed by: NURSE PRACTITIONER

## 2021-11-03 PROCEDURE — 96376 TX/PRO/DX INJ SAME DRUG ADON: CPT

## 2021-11-03 PROCEDURE — 84443 ASSAY THYROID STIM HORMONE: CPT | Performed by: INTERNAL MEDICINE

## 2021-11-03 RX ORDER — BUDESONIDE AND FORMOTEROL FUMARATE DIHYDRATE 160; 4.5 UG/1; UG/1
2 AEROSOL RESPIRATORY (INHALATION)
Status: DISCONTINUED | OUTPATIENT
Start: 2021-11-03 | End: 2021-11-05 | Stop reason: HOSPADM

## 2021-11-03 RX ORDER — LISINOPRIL 10 MG/1
10 TABLET ORAL DAILY
Status: DISCONTINUED | OUTPATIENT
Start: 2021-11-03 | End: 2021-11-05 | Stop reason: HOSPADM

## 2021-11-03 RX ORDER — LISINOPRIL 10 MG/1
10 TABLET ORAL DAILY
COMMUNITY
End: 2022-02-02

## 2021-11-03 RX ORDER — ATORVASTATIN CALCIUM 10 MG/1
10 TABLET, FILM COATED ORAL NIGHTLY
Status: DISCONTINUED | OUTPATIENT
Start: 2021-11-03 | End: 2021-11-05 | Stop reason: HOSPADM

## 2021-11-03 RX ORDER — LAMOTRIGINE 25 MG/1
25 TABLET ORAL 2 TIMES DAILY
Status: DISCONTINUED | OUTPATIENT
Start: 2021-11-03 | End: 2021-11-05 | Stop reason: HOSPADM

## 2021-11-03 RX ORDER — PANTOPRAZOLE SODIUM 40 MG/1
40 TABLET, DELAYED RELEASE ORAL
Refills: 1 | Status: DISCONTINUED | OUTPATIENT
Start: 2021-11-03 | End: 2021-11-05 | Stop reason: HOSPADM

## 2021-11-03 RX ORDER — GLIPIZIDE 5 MG/1
5 TABLET ORAL
Status: DISCONTINUED | OUTPATIENT
Start: 2021-11-03 | End: 2021-11-05 | Stop reason: HOSPADM

## 2021-11-03 RX ORDER — CETIRIZINE HYDROCHLORIDE 10 MG/1
10 TABLET ORAL DAILY
Refills: 1 | Status: DISCONTINUED | OUTPATIENT
Start: 2021-11-03 | End: 2021-11-05 | Stop reason: HOSPADM

## 2021-11-03 RX ORDER — VENLAFAXINE HYDROCHLORIDE 75 MG/1
300 CAPSULE, EXTENDED RELEASE ORAL DAILY
Status: DISCONTINUED | OUTPATIENT
Start: 2021-11-04 | End: 2021-11-05 | Stop reason: HOSPADM

## 2021-11-03 RX ORDER — HYDROCODONE BITARTRATE AND ACETAMINOPHEN 5; 325 MG/1; MG/1
1 TABLET ORAL EVERY 6 HOURS PRN
Status: DISCONTINUED | OUTPATIENT
Start: 2021-11-03 | End: 2021-11-03

## 2021-11-03 RX ORDER — HYDROCODONE BITARTRATE AND ACETAMINOPHEN 5; 325 MG/1; MG/1
1 TABLET ORAL EVERY 6 HOURS PRN
Status: COMPLETED | OUTPATIENT
Start: 2021-11-03 | End: 2021-11-04

## 2021-11-03 RX ORDER — CLONIDINE HYDROCHLORIDE 0.1 MG/1
0.1 TABLET ORAL 2 TIMES DAILY
Status: DISCONTINUED | OUTPATIENT
Start: 2021-11-03 | End: 2021-11-04

## 2021-11-03 RX ADMIN — GLIPIZIDE 5 MG: 5 TABLET ORAL at 17:48

## 2021-11-03 RX ADMIN — CETIRIZINE HYDROCHLORIDE 10 MG: 10 TABLET ORAL at 17:48

## 2021-11-03 RX ADMIN — IPRATROPIUM BROMIDE AND ALBUTEROL SULFATE 3 ML: 2.5; .5 SOLUTION RESPIRATORY (INHALATION) at 14:37

## 2021-11-03 RX ADMIN — FUROSEMIDE 40 MG: 10 INJECTION INTRAMUSCULAR; INTRAVENOUS at 11:40

## 2021-11-03 RX ADMIN — Medication 10 ML: at 11:40

## 2021-11-03 RX ADMIN — HYDROCODONE BITARTRATE AND ACETAMINOPHEN 1 TABLET: 5; 325 TABLET ORAL at 22:34

## 2021-11-03 RX ADMIN — PREDNISONE 40 MG: 20 TABLET ORAL at 13:15

## 2021-11-03 RX ADMIN — BUDESONIDE AND FORMOTEROL FUMARATE DIHYDRATE 2 PUFF: 160; 4.5 AEROSOL RESPIRATORY (INHALATION) at 20:19

## 2021-11-03 RX ADMIN — ENOXAPARIN SODIUM 40 MG: 40 INJECTION SUBCUTANEOUS at 21:00

## 2021-11-03 RX ADMIN — PANTOPRAZOLE SODIUM 40 MG: 40 TABLET, DELAYED RELEASE ORAL at 17:48

## 2021-11-03 RX ADMIN — IPRATROPIUM BROMIDE AND ALBUTEROL SULFATE 3 ML: 2.5; .5 SOLUTION RESPIRATORY (INHALATION) at 07:03

## 2021-11-03 RX ADMIN — INSULIN LISPRO 8 UNITS: 100 INJECTION, SOLUTION INTRAVENOUS; SUBCUTANEOUS at 17:50

## 2021-11-03 RX ADMIN — LAMOTRIGINE 25 MG: 25 TABLET ORAL at 21:00

## 2021-11-03 RX ADMIN — Medication 10 ML: at 21:05

## 2021-11-03 RX ADMIN — ATORVASTATIN CALCIUM 10 MG: 10 TABLET, FILM COATED ORAL at 21:00

## 2021-11-03 RX ADMIN — FUROSEMIDE 40 MG: 10 INJECTION INTRAMUSCULAR; INTRAVENOUS at 17:49

## 2021-11-03 RX ADMIN — IPRATROPIUM BROMIDE AND ALBUTEROL SULFATE 3 ML: 2.5; .5 SOLUTION RESPIRATORY (INHALATION) at 20:19

## 2021-11-03 RX ADMIN — CLONIDINE HYDROCHLORIDE 0.1 MG: 0.1 TABLET ORAL at 21:00

## 2021-11-03 RX ADMIN — INSULIN LISPRO 8 UNITS: 100 INJECTION, SOLUTION INTRAVENOUS; SUBCUTANEOUS at 20:59

## 2021-11-03 RX ADMIN — AZITHROMYCIN MONOHYDRATE 500 MG: 500 INJECTION, POWDER, LYOPHILIZED, FOR SOLUTION INTRAVENOUS at 20:59

## 2021-11-03 RX ADMIN — PERFLUTREN 1.17 MG: 6.52 INJECTION, SUSPENSION INTRAVENOUS at 10:28

## 2021-11-03 RX ADMIN — LISINOPRIL 10 MG: 10 TABLET ORAL at 17:48

## 2021-11-03 RX ADMIN — IPRATROPIUM BROMIDE AND ALBUTEROL SULFATE 3 ML: 2.5; .5 SOLUTION RESPIRATORY (INHALATION) at 10:56

## 2021-11-03 NOTE — PLAN OF CARE
Problem: Adult Inpatient Plan of Care  Goal: Plan of Care Review  Outcome: Ongoing, Progressing  Flowsheets (Taken 11/2/2021 5041)  Progress: no change  Plan of Care Reviewed With: patient  Outcome Summary: Patient admitted to 412. Patient has no c/o pain. On 2L of O2. Receiving IV antibiotics. VSS. Safety maintained. Will notify MD of any changes.

## 2021-11-03 NOTE — PROGRESS NOTES
[facility] Medicine Services  INPATIENT PROGRESS NOTE    Length of Stay: 0  Date of Admission: 11/2/2021  Primary Care Physician: Alejandra Chaudhari DO    Subjective   Chief Complaint: Follow-up on shortness of breath  HPI   Is sleeping in bed.  She seems lethargic upon waking her.  After attempting to move her to her other side she seemed to wake up a bit more.  Stated that she was not having any current chest pain.  She feels her shortness of breath is better compared to admission.  She feels her lower extremity edema is better as well.    Review of Systems   All pertinent negatives and positives are as above. All other systems have been reviewed and are negative unless otherwise stated.     Objective    Temp:  [98.1 °F (36.7 °C)-98.5 °F (36.9 °C)] 98.2 °F (36.8 °C)  Heart Rate:  [] 91  Resp:  [12-24] 16  BP: (120-159)/(58-83) 144/69  Physical Exam  Vitals and nursing note reviewed.   Constitutional:       Appearance: She is ill-appearing.      Comments: Chronically ill-appearing   HENT:      Head: Normocephalic and atraumatic.   Cardiovascular:      Rate and Rhythm: Normal rate and regular rhythm.      Heart sounds: Normal heart sounds.      Comments: Sinus/sinus tach   Pulmonary:      Breath sounds: Rales present.      Comments: Diminished breath sounds laterally, fine rales at right base  Abdominal:      General: Bowel sounds are normal.   Musculoskeletal:         General: Swelling present.      Comments: Dependent edema to the right lower quadrant   Skin:     General: Skin is warm.      Findings: Erythema present.      Comments: Erythema to the right lower quadrant   Neurological:      General: No focal deficit present.      Mental Status: She is oriented to person, place, and time. Mental status is at baseline.      Comments: Initially quite lethargic   Psychiatric:         Mood and Affect: Mood normal.         Behavior: Behavior normal.         Thought  Content: Thought content normal.         Judgment: Judgment normal.       Results Review:  I have reviewed the labs, radiology results, and diagnostic studies.    Laboratory Data:   Results from last 7 days   Lab Units 11/03/21  0230 11/02/21  1557   WBC 10*3/mm3 10.15 10.10   HEMOGLOBIN g/dL 10.9* 11.0*   HEMATOCRIT % 36.9 36.9   PLATELETS 10*3/mm3 263 267     Results from last 7 days   Lab Units 11/03/21  0230 11/02/21  1557   SODIUM mmol/L 132* 137   POTASSIUM mmol/L 4.2 4.1   CHLORIDE mmol/L 89* 93*   CO2 mmol/L 37.0* 40.0*   BUN mg/dL 8 6   CREATININE mg/dL 0.65 0.63   CALCIUM mg/dL 9.0 9.1   BILIRUBIN mg/dL  --  0.5   ALK PHOS U/L  --  93   ALT (SGPT) U/L  --  8   AST (SGOT) U/L  --  11   GLUCOSE mg/dL 321* 212*     Radiology Data:   Imaging Results (Last 24 Hours)     Procedure Component Value Units Date/Time    CT Angiogram Chest [[id number]] Collected: 11/02/21 1922     Updated: 11/02/21 1935    Narrative:      CT chest angiogram dated 11/2/2021 6:57 PM CDT      HISTORY: Pulmonary most suspected. Positive d-dimer.     COMPARISON: None.      DLP: 520.1 mGy cm     TECHNIQUE: Helical tomographic images of the chest were obtained after  the administration of intravenous contrast following angiogram protocol.  Additionally, 3D MIP reconstructions in the coronal and sagittal planes  were provided.        FINDINGS:    The imaged portion of the base of the neck appears unremarkable.      There is adequate enhancement of the pulmonary arteries to evaluate for  central and segmental pulmonary emboli. There are no filling defects  within the main, lobar, segmental or visualized subsegmental pulmonary  arteries. The pulmonary vessels are within normal limits.      There is linear atelectasis within both upper lobes as well as within  the right middle lobe and both lower lobes. There is more confluent  within the right lower lobe and could even represent a right lower lobe  pneumonia.. The trachea and bronchial tree are  patent.      There is mild cardiomegaly. Moderate coronary calcifications are  present.. There is no pericardial effusion.      No enlarged axillary or mediastinal lymph nodes are present.      The osseous structures of the thorax and surrounding soft tissues  demonstrate no acute process.      There is elevation of the right diaphragm. Limited images of the upper  abdomen are otherwise unremarkable..        Impression:      1. No evidence of pulmonary thromboembolic disease. The thoracic aorta  and proximal great vessels are normal in caliber without evidence of  aneurysm or dissection.  2. Cardiomegaly with coronary calcifications. No evidence of pericardial  effusion.  3. There is platelike atelectasis within both lungs. There is elevation  of the right hemidiaphragm with more confluent right lower lobe  atelectasis or infiltrate. I would favor atelectasis..        This report was finalized on 11/02/2021 19:31 by Dr. Ronnie Newsome MD.    XR Chest 1 View [[id number]] Collected: 11/02/21 1555     Updated: 11/02/21 1559    Narrative:      EXAMINATION: XR CHEST 1 VW-     11/2/2021 3:54 PM CDT     HISTORY: Weakness     1 view chest x-ray.     Remote comparison from 10/28/2015.     Hypoaeration.  CABG changes.     Central vascular prominence suspicious for early failure.     Chronic right diaphragm elevation.     No pneumothorax.     Summary:  1. Central vascular and interstitial prominence suspicious for early  heart failure.  This report was finalized on 11/02/2021 15:56 by Dr. Pete West MD.        I have reviewed the patient current medications.     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   Patient arrived to ED on 11/2/2021 with a chief complaint of shortness of breath.  She also presents with complaints of worsening weight gain, fluid retention, wheezing, orthopnea, and paroxysmal nocturnal dyspnea.  She also notes that her lower extremities have began to swell and has even noticed edema in her abdominal wall.  Patient indicated in the ED that she had a past medical history containing congestive heart failure since approximately 2004 or 2005 but doesn't recall having an echocardiogram.  Patient was then given diuretics and nitroglycerin in the ED to reduce symptoms.  She was also placed on BiPAP and admitted to hospitalist.  2.  Patient was ordered an echocardiogram for evaluation of possible congestive heart failure.  The echo showed diastolic dysfunction with an ejection fraction of 51 to 55%.  She has been receiving 40 mg of Lasix twice daily, plan to give 1 more dose today and reassess her volume status in the morning.  Continue strict intake and output, daily weights, cardiac diet.  3.  Patient is currently on 2 L nasal cannula, she does not wear oxygen at home.  She is also receiving DuoNeb, prednisone, and azithromycin for treatment of COPD exacerbation.  Plan to treat with 3 doses of azithromycin.  Encourage use of flutter valve.  4.  Previous blood glucose levels-212, 321, 283.  Reviewed patient's home medications and added glipizide and high-dose sliding scale insulin to correct.  Hemoglobin A1c 8.3%.  5.  Reviewed patient's previous blood pressures.  Added lisinopril and clonidine per the patient's home medications for control.  6.  Lovenox for DVT prophylaxis.  7.  Labs in a.m.  8.  Encourage patient to increase activity as tolerated.    Discharge Planning: I expect the patient to be discharged to home in 1-3 days.    Electronically signed by RICK Coronado, 11/3/2021, 12:45 CDT.

## 2021-11-03 NOTE — CASE MANAGEMENT/SOCIAL WORK
Discharge Planning Assessment  [facility]     Patient Name: Nora Carney  MRN: [id number]  Today's Date: 11/3/2021    Admit Date: 11/2/2021     Discharge Needs Assessment     Row Name 11/03/21 1345       Living Environment    Lives With alone    Current Living Arrangements home/apartment/condo    Primary Care Provided by self    Able to Return to Prior Arrangements yes       Discharge Needs Assessment    Equipment Currently Used at Home oxygen; cane, quad; shower chair    Equipment Needed After Discharge scales    Current Discharge Risk chronically ill; lives alone    Row Name 11/03/21 1344       Living Environment    Lives With alone               Discharge Plan     Row Name 11/03/21 1348       Plan    Plan Lives alone and cares for self. Has a friend Merlyn that can assist when needed. Home oxygen supplied by [employer]. Scales given to patient, as she did not have any and unable to afford. Plans to discharge home and denies any other needs. Will continue to follow.              Continued Care and Services - Admitted Since 11/2/2021    Coordination has not been started for this encounter.          Demographic Summary    No documentation.                Functional Status    No documentation.                Psychosocial    No documentation.                Abuse/Neglect    No documentation.                Legal    No documentation.                Substance Abuse    No documentation.                Patient Forms    No documentation.                   Merlina A Fletcher, RN

## 2021-11-04 LAB
ANION GAP SERPL CALCULATED.3IONS-SCNC: 5 MMOL/L (ref 5–15)
BUN SERPL-MCNC: 11 MG/DL (ref 6–20)
BUN/CREAT SERPL: 15.7 (ref 7–25)
CALCIUM SPEC-SCNC: 9.1 MG/DL (ref 8.6–10.5)
CHLORIDE SERPL-SCNC: 92 MMOL/L (ref 98–107)
CO2 SERPL-SCNC: 44 MMOL/L (ref 22–29)
CREAT SERPL-MCNC: 0.7 MG/DL (ref 0.57–1)
DEPRECATED RDW RBC AUTO: 44.8 FL (ref 37–54)
ERYTHROCYTE [DISTWIDTH] IN BLOOD BY AUTOMATED COUNT: 15.5 % (ref 12.3–15.4)
GFR SERPL CREATININE-BSD FRML MDRD: 86 ML/MIN/1.73
GLUCOSE BLDC GLUCOMTR-MCNC: 126 MG/DL (ref 70–130)
GLUCOSE BLDC GLUCOMTR-MCNC: 189 MG/DL (ref 70–130)
GLUCOSE BLDC GLUCOMTR-MCNC: 239 MG/DL (ref 70–130)
GLUCOSE BLDC GLUCOMTR-MCNC: 331 MG/DL (ref 70–130)
GLUCOSE SERPL-MCNC: 115 MG/DL (ref 65–99)
HCT VFR BLD AUTO: 36.6 % (ref 34–46.6)
HGB BLD-MCNC: 10.7 G/DL (ref 12–15.9)
MCH RBC QN AUTO: 23.2 PG (ref 26.6–33)
MCHC RBC AUTO-ENTMCNC: 29.2 G/DL (ref 31.5–35.7)
MCV RBC AUTO: 79.4 FL (ref 79–97)
PLATELET # BLD AUTO: 298 10*3/MM3 (ref 140–450)
PMV BLD AUTO: 9.8 FL (ref 6–12)
POTASSIUM SERPL-SCNC: 3.5 MMOL/L (ref 3.5–5.2)
RBC # BLD AUTO: 4.61 10*6/MM3 (ref 3.77–5.28)
SODIUM SERPL-SCNC: 141 MMOL/L (ref 136–145)
WBC # BLD AUTO: 13.72 10*3/MM3 (ref 3.4–10.8)

## 2021-11-04 PROCEDURE — 82962 GLUCOSE BLOOD TEST: CPT

## 2021-11-04 PROCEDURE — 96372 THER/PROPH/DIAG INJ SC/IM: CPT

## 2021-11-04 PROCEDURE — 80048 BASIC METABOLIC PNL TOTAL CA: CPT | Performed by: NURSE PRACTITIONER

## 2021-11-04 PROCEDURE — 25010000002 ENOXAPARIN PER 10 MG: Performed by: INTERNAL MEDICINE

## 2021-11-04 PROCEDURE — 25010000002 AZITHROMYCIN PER 500 MG: Performed by: NURSE PRACTITIONER

## 2021-11-04 PROCEDURE — 63710000001 INSULIN LISPRO (HUMAN) PER 5 UNITS: Performed by: NURSE PRACTITIONER

## 2021-11-04 PROCEDURE — 94799 UNLISTED PULMONARY SVC/PX: CPT

## 2021-11-04 PROCEDURE — 85027 COMPLETE CBC AUTOMATED: CPT | Performed by: NURSE PRACTITIONER

## 2021-11-04 PROCEDURE — 96375 TX/PRO/DX INJ NEW DRUG ADDON: CPT

## 2021-11-04 PROCEDURE — 25010000002 ACETAZOLAMIDE PER 500 MG: Performed by: NURSE PRACTITIONER

## 2021-11-04 PROCEDURE — 63710000001 PREDNISONE PER 1 MG: Performed by: INTERNAL MEDICINE

## 2021-11-04 PROCEDURE — G0378 HOSPITAL OBSERVATION PER HR: HCPCS

## 2021-11-04 RX ORDER — FUROSEMIDE 40 MG/1
40 TABLET ORAL DAILY
Status: DISCONTINUED | OUTPATIENT
Start: 2021-11-04 | End: 2021-11-05 | Stop reason: HOSPADM

## 2021-11-04 RX ORDER — POTASSIUM CHLORIDE 750 MG/1
40 CAPSULE, EXTENDED RELEASE ORAL ONCE
Status: COMPLETED | OUTPATIENT
Start: 2021-11-04 | End: 2021-11-04

## 2021-11-04 RX ORDER — CARVEDILOL 6.25 MG/1
6.25 TABLET ORAL 2 TIMES DAILY WITH MEALS
Status: DISCONTINUED | OUTPATIENT
Start: 2021-11-04 | End: 2021-11-05 | Stop reason: HOSPADM

## 2021-11-04 RX ORDER — ACETAZOLAMIDE 500 MG/5ML
250 INJECTION, POWDER, LYOPHILIZED, FOR SOLUTION INTRAVENOUS ONCE
Status: COMPLETED | OUTPATIENT
Start: 2021-11-04 | End: 2021-11-04

## 2021-11-04 RX ADMIN — INSULIN LISPRO 8 UNITS: 100 INJECTION, SOLUTION INTRAVENOUS; SUBCUTANEOUS at 11:55

## 2021-11-04 RX ADMIN — BUDESONIDE AND FORMOTEROL FUMARATE DIHYDRATE 2 PUFF: 160; 4.5 AEROSOL RESPIRATORY (INHALATION) at 07:01

## 2021-11-04 RX ADMIN — AZITHROMYCIN MONOHYDRATE 500 MG: 500 INJECTION, POWDER, LYOPHILIZED, FOR SOLUTION INTRAVENOUS at 21:13

## 2021-11-04 RX ADMIN — CARVEDILOL 6.25 MG: 6.25 TABLET, FILM COATED ORAL at 17:25

## 2021-11-04 RX ADMIN — LISINOPRIL 10 MG: 10 TABLET ORAL at 09:26

## 2021-11-04 RX ADMIN — CARVEDILOL 6.25 MG: 6.25 TABLET, FILM COATED ORAL at 09:26

## 2021-11-04 RX ADMIN — GLIPIZIDE 5 MG: 5 TABLET ORAL at 09:25

## 2021-11-04 RX ADMIN — LAMOTRIGINE 25 MG: 25 TABLET ORAL at 09:26

## 2021-11-04 RX ADMIN — IPRATROPIUM BROMIDE AND ALBUTEROL SULFATE 3 ML: 2.5; .5 SOLUTION RESPIRATORY (INHALATION) at 10:08

## 2021-11-04 RX ADMIN — GLIPIZIDE 5 MG: 5 TABLET ORAL at 17:25

## 2021-11-04 RX ADMIN — CETIRIZINE HYDROCHLORIDE 10 MG: 10 TABLET ORAL at 09:29

## 2021-11-04 RX ADMIN — Medication 10 ML: at 21:14

## 2021-11-04 RX ADMIN — PANTOPRAZOLE SODIUM 40 MG: 40 TABLET, DELAYED RELEASE ORAL at 09:25

## 2021-11-04 RX ADMIN — IPRATROPIUM BROMIDE AND ALBUTEROL SULFATE 3 ML: 2.5; .5 SOLUTION RESPIRATORY (INHALATION) at 14:22

## 2021-11-04 RX ADMIN — ATORVASTATIN CALCIUM 10 MG: 10 TABLET, FILM COATED ORAL at 21:13

## 2021-11-04 RX ADMIN — LAMOTRIGINE 25 MG: 25 TABLET ORAL at 21:13

## 2021-11-04 RX ADMIN — IPRATROPIUM BROMIDE AND ALBUTEROL SULFATE 3 ML: 2.5; .5 SOLUTION RESPIRATORY (INHALATION) at 20:28

## 2021-11-04 RX ADMIN — FUROSEMIDE 40 MG: 40 TABLET ORAL at 09:26

## 2021-11-04 RX ADMIN — VENLAFAXINE HYDROCHLORIDE 300 MG: 75 CAPSULE, EXTENDED RELEASE ORAL at 09:26

## 2021-11-04 RX ADMIN — INSULIN LISPRO 16 UNITS: 100 INJECTION, SOLUTION INTRAVENOUS; SUBCUTANEOUS at 21:13

## 2021-11-04 RX ADMIN — Medication 10 ML: at 09:29

## 2021-11-04 RX ADMIN — IPRATROPIUM BROMIDE AND ALBUTEROL SULFATE 3 ML: 2.5; .5 SOLUTION RESPIRATORY (INHALATION) at 07:00

## 2021-11-04 RX ADMIN — PREDNISONE 40 MG: 20 TABLET ORAL at 09:25

## 2021-11-04 RX ADMIN — BUDESONIDE AND FORMOTEROL FUMARATE DIHYDRATE 2 PUFF: 160; 4.5 AEROSOL RESPIRATORY (INHALATION) at 20:28

## 2021-11-04 RX ADMIN — POTASSIUM CHLORIDE 40 MEQ: 10 CAPSULE, COATED, EXTENDED RELEASE ORAL at 09:25

## 2021-11-04 RX ADMIN — HYDROCODONE BITARTRATE AND ACETAMINOPHEN 1 TABLET: 5; 325 TABLET ORAL at 09:35

## 2021-11-04 RX ADMIN — ENOXAPARIN SODIUM 40 MG: 40 INJECTION SUBCUTANEOUS at 21:13

## 2021-11-04 RX ADMIN — INSULIN LISPRO 4 UNITS: 100 INJECTION, SOLUTION INTRAVENOUS; SUBCUTANEOUS at 17:24

## 2021-11-04 RX ADMIN — ACETAZOLAMIDE 250 MG: 500 INJECTION, POWDER, LYOPHILIZED, FOR SOLUTION INTRAVENOUS at 09:26

## 2021-11-04 NOTE — PLAN OF CARE
Goal Outcome Evaluation:  Plan of Care Reviewed With: patient        Progress: no change   VSS. C/o pain in back and hips. MD notified. Two doses norco 5mg ordered q6h. S/ST  on tele. Pt anxious to go home. Safety maintained.

## 2021-11-04 NOTE — PROGRESS NOTES
[facility] Medicine Services  INPATIENT PROGRESS NOTE    Length of Stay: 0  Date of Admission: 11/2/2021  Primary Care Physician: Alejandra Chaudhari DO    Subjective   Chief Complaint: Follow-up  HPI   Patient is sleeping in bed, lying flat. She is much more easily aroused than yesterday.  She states that she is breathing better than yesterday.  She says has been staying off of her right side more today, and it does appear to be less edematous.  Lower extremity edema is better today as well. She denies having any current chest pain.     Review of Systems   All pertinent negatives and positives are as above. All other systems have been reviewed and are negative unless otherwise stated.     Objective    Temp:  [97.6 °F (36.4 °C)-98.2 °F (36.8 °C)] 98 °F (36.7 °C)  Heart Rate:  [] 93  Resp:  [16-20] 16  BP: (123-153)/(50-69) 123/50  Physical Exam  Constitutional:       Appearance: Normal appearance.   HENT:      Head: Normocephalic and atraumatic.   Cardiovascular:      Rate and Rhythm: Normal rate and regular rhythm.      Heart sounds: Normal heart sounds.      Comments: Sinus rhythm   Pulmonary:      Breath sounds: Wheezing (bilateral expiratory wheeze) and rales (Right lower lobe) present.   Abdominal:      General: Bowel sounds are normal.   Musculoskeletal:         General: Normal range of motion.      Right lower leg: No edema.      Left lower leg: No edema.      Comments: Edema and right side abdomen has improved but is still noticeable.   Skin:     General: Skin is warm and dry.      Findings: Erythema (right abdominal wall-improved today) present.   Neurological:      General: No focal deficit present.      Mental Status: She is oriented to person, place, and time. Mental status is at baseline.   Psychiatric:         Mood and Affect: Mood normal.         Behavior: Behavior normal.         Thought Content: Thought content normal.         Judgment: Judgment  normal.     Results Review:  I have reviewed the labs, radiology results, and diagnostic studies.    Laboratory Data:   Results from last 7 days   Lab Units 11/04/21  0456 11/03/21 0230 11/02/21  1557   WBC 10*3/mm3 13.72* 10.15 10.10   HEMOGLOBIN g/dL 10.7* 10.9* 11.0*   HEMATOCRIT % 36.6 36.9 36.9   PLATELETS 10*3/mm3 298 263 267        Results from last 7 days   Lab Units 11/04/21  0456 11/03/21  0230 11/02/21  1557   SODIUM mmol/L 141 132* 137   POTASSIUM mmol/L 3.5 4.2 4.1   CHLORIDE mmol/L 92* 89* 93*   CO2 mmol/L 44.0* 37.0* 40.0*   BUN mg/dL 11 8 6   CREATININE mg/dL 0.70 0.65 0.63   CALCIUM mg/dL 9.1 9.0 9.1   BILIRUBIN mg/dL  --   --  0.5   ALK PHOS U/L  --   --  93   ALT (SGPT) U/L  --   --  8   AST (SGOT) U/L  --   --  11   GLUCOSE mg/dL 115* 321* 212*     I have reviewed the patient current medications.     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  Patient arrived to ED on 11/2/2021 with a chief complaint of shortness of breath.  She also presents with complaints of worsening weight gain, fluid retention, wheezing, orthopnea, and paroxysmal nocturnal dyspnea.  She also notes that her lower extremities have began to swell and has even noticed edema in her abdominal wall.  Patient indicated in the ED that she had a past medical history containing congestive heart failure since approximately 2004 or 2005 but doesn't recall having an echocardiogram.  Patient was then given diuretics and nitroglycerin in the ED to reduce symptoms.  She was also placed on BiPAP and admitted to hospitalist.  2.  Patient was ordered an echocardiogram for evaluation of possible congestive  heart failure.  The echo showed diastolic dysfunction with an ejection fraction of 51 to 55%.  She has been receiving 40 mg of IV Lasix twice daily and discontinued yesterday.  After evaluating her today we decided to place the patient on oral Lasix 40 mg daily and IV Diamox x1 today for treatment of diastolic heart failure. Continue strict intake and output, daily weights, cardiac diet.  Would likely plan to discharge on as needed dosing of Lasix.  Patient to be given a scale per case management.  3.  Patient is currently on 2 L nasal cannula, she does not wear oxygen at home during the day but does wear 2 L at night.  She is also receiving DuoNeb, prednisone, and azithromycin for treatment of COPD exacerbation. Plan to treat with 3 doses of azithromycin-today will complete third dose.  We also plan to wean the patient off of her oxygen during the day as that is not her home regimen.  May need home oxygen evaluation prior to discharge if unable to wean. Encourage use of flutter valve.  4.  Previous blood glucose levels-209, 275, 115. Continue glipizide and high-dose sliding scale insulin to correct.  Hemoglobin A1c 8.3%.  5.  Reviewed patient's previous blood pressures. Continue lisinopril and clonidine.  6.  Lovenox for DVT prophylaxis.  7.  Labs in a.m.  8.  Encourage patient to increase activity as tolerated.    Discharge Planning: I expect the patient to be discharged to home in 1-2 days.    Electronically signed by RICK Coronado, 11/4/2021, 09:17 CDT.

## 2021-11-04 NOTE — PLAN OF CARE
Problem: Adult Inpatient Plan of Care  Goal: Plan of Care Review  Outcome: Ongoing, Progressing  Flowsheets (Taken 11/4/2021 1703)  Progress: no change  Plan of Care Reviewed With: patient  Outcome Summary: Weaned O2 down to 0.5L NC. When attempting room air, pt sats dropped to 87-88%. Sats mid-90s on 0.5L. Other VSS, NSR/ST  on tele. Encouraged activity, pt up to BSC independently, tolerating well.

## 2021-11-05 ENCOUNTER — READMISSION MANAGEMENT (OUTPATIENT)
Dept: CALL CENTER | Facility: HOSPITAL | Age: 61
End: 2021-11-05

## 2021-11-05 VITALS
DIASTOLIC BLOOD PRESSURE: 54 MMHG | TEMPERATURE: 98.3 F | HEIGHT: 65 IN | BODY MASS INDEX: 37.65 KG/M2 | OXYGEN SATURATION: 96 % | SYSTOLIC BLOOD PRESSURE: 102 MMHG | HEART RATE: 74 BPM | WEIGHT: 226 LBS | RESPIRATION RATE: 16 BRPM

## 2021-11-05 LAB
ANION GAP SERPL CALCULATED.3IONS-SCNC: 6 MMOL/L (ref 5–15)
BUN SERPL-MCNC: 13 MG/DL (ref 6–20)
BUN/CREAT SERPL: 13.8 (ref 7–25)
CALCIUM SPEC-SCNC: 8.6 MG/DL (ref 8.6–10.5)
CHLORIDE SERPL-SCNC: 92 MMOL/L (ref 98–107)
CO2 SERPL-SCNC: 37 MMOL/L (ref 22–29)
CREAT SERPL-MCNC: 0.94 MG/DL (ref 0.57–1)
DEPRECATED RDW RBC AUTO: 45.9 FL (ref 37–54)
ERYTHROCYTE [DISTWIDTH] IN BLOOD BY AUTOMATED COUNT: 15.5 % (ref 12.3–15.4)
GFR SERPL CREATININE-BSD FRML MDRD: 61 ML/MIN/1.73
GLUCOSE BLDC GLUCOMTR-MCNC: 140 MG/DL (ref 70–130)
GLUCOSE SERPL-MCNC: 187 MG/DL (ref 65–99)
HCT VFR BLD AUTO: 36.1 % (ref 34–46.6)
HGB BLD-MCNC: 10.7 G/DL (ref 12–15.9)
MCH RBC QN AUTO: 24.2 PG (ref 26.6–33)
MCHC RBC AUTO-ENTMCNC: 29.6 G/DL (ref 31.5–35.7)
MCV RBC AUTO: 81.5 FL (ref 79–97)
PLATELET # BLD AUTO: 263 10*3/MM3 (ref 140–450)
PMV BLD AUTO: 9.8 FL (ref 6–12)
POTASSIUM SERPL-SCNC: 3.4 MMOL/L (ref 3.5–5.2)
RBC # BLD AUTO: 4.43 10*6/MM3 (ref 3.77–5.28)
SODIUM SERPL-SCNC: 135 MMOL/L (ref 136–145)
WBC # BLD AUTO: 12.54 10*3/MM3 (ref 3.4–10.8)

## 2021-11-05 PROCEDURE — 94799 UNLISTED PULMONARY SVC/PX: CPT

## 2021-11-05 PROCEDURE — 94618 PULMONARY STRESS TESTING: CPT

## 2021-11-05 PROCEDURE — 63710000001 PREDNISONE PER 1 MG: Performed by: INTERNAL MEDICINE

## 2021-11-05 PROCEDURE — 82962 GLUCOSE BLOOD TEST: CPT

## 2021-11-05 PROCEDURE — 85027 COMPLETE CBC AUTOMATED: CPT | Performed by: NURSE PRACTITIONER

## 2021-11-05 PROCEDURE — G0378 HOSPITAL OBSERVATION PER HR: HCPCS

## 2021-11-05 PROCEDURE — 80048 BASIC METABOLIC PNL TOTAL CA: CPT | Performed by: NURSE PRACTITIONER

## 2021-11-05 RX ORDER — CLONIDINE HYDROCHLORIDE 0.1 MG/1
0.1 TABLET ORAL 2 TIMES DAILY PRN
Qty: 60 TABLET | Refills: 1
Start: 2021-11-05 | End: 2021-12-13

## 2021-11-05 RX ORDER — FUROSEMIDE 40 MG/1
TABLET ORAL
Qty: 30 TABLET | Refills: 1 | Status: SHIPPED | OUTPATIENT
Start: 2021-11-05 | End: 2022-02-15 | Stop reason: SDUPTHER

## 2021-11-05 RX ORDER — POTASSIUM CHLORIDE 750 MG/1
40 CAPSULE, EXTENDED RELEASE ORAL ONCE
Status: COMPLETED | OUTPATIENT
Start: 2021-11-05 | End: 2021-11-05

## 2021-11-05 RX ORDER — CARVEDILOL 6.25 MG/1
6.25 TABLET ORAL 2 TIMES DAILY WITH MEALS
Qty: 60 TABLET | Refills: 3 | Status: SHIPPED | OUTPATIENT
Start: 2021-11-05

## 2021-11-05 RX ORDER — PREDNISONE 10 MG/1
TABLET ORAL
Qty: 9 TABLET | Refills: 0 | Status: SHIPPED | OUTPATIENT
Start: 2021-11-05 | End: 2021-11-11

## 2021-11-05 RX ADMIN — PREDNISONE 40 MG: 20 TABLET ORAL at 08:24

## 2021-11-05 RX ADMIN — BUDESONIDE AND FORMOTEROL FUMARATE DIHYDRATE 2 PUFF: 160; 4.5 AEROSOL RESPIRATORY (INHALATION) at 06:18

## 2021-11-05 RX ADMIN — CARVEDILOL 6.25 MG: 6.25 TABLET, FILM COATED ORAL at 08:24

## 2021-11-05 RX ADMIN — LAMOTRIGINE 25 MG: 25 TABLET ORAL at 08:24

## 2021-11-05 RX ADMIN — FUROSEMIDE 40 MG: 40 TABLET ORAL at 08:24

## 2021-11-05 RX ADMIN — IPRATROPIUM BROMIDE AND ALBUTEROL SULFATE 3 ML: 2.5; .5 SOLUTION RESPIRATORY (INHALATION) at 10:04

## 2021-11-05 RX ADMIN — POTASSIUM CHLORIDE 40 MEQ: 10 CAPSULE, COATED, EXTENDED RELEASE ORAL at 08:26

## 2021-11-05 RX ADMIN — LISINOPRIL 10 MG: 10 TABLET ORAL at 08:24

## 2021-11-05 RX ADMIN — Medication 10 ML: at 08:25

## 2021-11-05 RX ADMIN — GLIPIZIDE 5 MG: 5 TABLET ORAL at 08:24

## 2021-11-05 RX ADMIN — IPRATROPIUM BROMIDE AND ALBUTEROL SULFATE 3 ML: 2.5; .5 SOLUTION RESPIRATORY (INHALATION) at 06:18

## 2021-11-05 RX ADMIN — VENLAFAXINE HYDROCHLORIDE 300 MG: 75 CAPSULE, EXTENDED RELEASE ORAL at 08:24

## 2021-11-05 RX ADMIN — CETIRIZINE HYDROCHLORIDE 10 MG: 10 TABLET ORAL at 08:24

## 2021-11-05 RX ADMIN — PANTOPRAZOLE SODIUM 40 MG: 40 TABLET, DELAYED RELEASE ORAL at 08:23

## 2021-11-05 NOTE — PLAN OF CARE
Goal Outcome Evaluation:  Plan of Care Reviewed With: patient        Progress: no change   VSS. No c/o pain. S/SA 79-84; PACs. Pt on 1L of oxygen. Pt preparing for discharge home. Requested a bed and bedside commode prior to discharge. Safety maintained.

## 2021-11-05 NOTE — CASE MANAGEMENT/SOCIAL WORK
Continued Stay Note  [facility]     Patient Name: Nora Carney  MRN: [id number]  Today's Date: 11/5/2021    Admit Date: 11/2/2021     Discharge Plan     Row Name 11/05/21 1054       Plan    Plan Home    Provided Post Acute Provider List? Yes    Post Acute Provider List DME Supplier    Delivered To Patient    Method of Delivery Telephone    Patient/Family in Agreement with Plan yes    Final Discharge Disposition Code 01 - home or self-care    Final Note Referral for BSC, hospital bed and new 02 settings.  Pt chose [facility] because she already gets home 02 through them.  SW informed Jennifer and faxed referrals.  Pt states she already has a portable 02 tank so [facility] will deliver the rest of the DME to pt's home today post dc.    Row Name 11/05/21 0946       Plan    Patient/Family in Agreement with Plan yes    Final Discharge Disposition Code 01 - home or self-care               Discharge Codes    No documentation.               Expected Discharge Date and Time     Expected Discharge Date Expected Discharge Time    Nov 5, 2021             LORE Alvarez

## 2021-11-05 NOTE — DISCHARGE SUMMARY
[facility] Medicine Services  DISCHARGE SUMMARY       Date of Admission: 11/2/2021  Date of Discharge:  11/5/2021  Primary Care Physician: Alejandra Chaudhari DO    Presenting Problem/History of Present Illness:  Shortness of breath    Final Discharge Diagnoses:  Active Hospital Problems    Diagnosis    • **Acute on chronic diastolic CHF (congestive heart failure) (HCC)    • Acute respiratory failure with hypoxia (HCC)    • History of AVNRT (AV karl re-entry tachycardia) (HCC) s/p ablation     • Tobacco abuse    • Obesity (BMI 30-39.9)    • COPD with acute exacerbation (HCC)    • Acute pulmonary edema (HCC)      Consults: None    Procedures Performed: None    Pertinent Test Results:   Lab Results (all)     Procedure Component Value Units Date/Time    POC Glucose Once [[id number]]  (Abnormal) Collected: 11/05/21 0751    Specimen: Blood Updated: 11/05/21 0802     Glucose 140 mg/dL      Comment: : [id number] Elvia SamiMeter ID: [id number]       Basic Metabolic Panel [[id number]]  (Abnormal) Collected: 11/05/21 0536    Specimen: Blood Updated: 11/05/21 0615     Glucose 187 mg/dL      BUN 13 mg/dL      Creatinine 0.94 mg/dL      Sodium 135 mmol/L      Potassium 3.4 mmol/L      Comment: Slight hemolysis detected by analyzer. Results may be affected.        Chloride 92 mmol/L      CO2 37.0 mmol/L      Calcium 8.6 mg/dL      eGFR Non African Amer 61 mL/min/1.73      BUN/Creatinine Ratio 13.8     Anion Gap 6.0 mmol/L     CBC (No Diff) [[id number]]  (Abnormal) Collected: 11/05/21 0536    Specimen: Blood Updated: 11/05/21 0600     WBC 12.54 10*3/mm3      RBC 4.43 10*6/mm3      Hemoglobin 10.7 g/dL      Hematocrit 36.1 %      MCV 81.5 fL      MCH 24.2 pg      MCHC 29.6 g/dL      RDW 15.5 %      RDW-SD 45.9 fl      MPV 9.8 fL      Platelets 263 10*3/mm3     POC Glucose Once [[id number]]  (Abnormal) Collected: 11/04/21 2105    Specimen: Blood Updated: 11/04/21 2116     Glucose 331 mg/dL       Comment: : [id number] Black PatlinMeter ID: [id number]       POC Glucose Once [[id number]]  (Abnormal) Collected: 11/04/21 1600    Specimen: Blood Updated: 11/04/21 1636     Glucose 189 mg/dL      Comment: : [id number] Preston LisaMeter ID: [id number]       Blood Culture - Blood, Arm, Right [[id number]]  (Normal) Collected: 11/02/21 1550    Specimen: Blood from Arm, Right Updated: 11/04/21 1615     Blood Culture No growth at 2 days    Blood Culture - Blood, Hand, Right [[id number]]  (Normal) Collected: 11/02/21 1558    Specimen: Blood from Hand, Right Updated: 11/04/21 1615     Blood Culture No growth at 2 days    POC Glucose Once [[id number]]  (Abnormal) Collected: 11/04/21 1109    Specimen: Blood Updated: 11/04/21 1145     Glucose 239 mg/dL      Comment: : [id number] Preston LisaMeter ID: [id number]       POC Glucose Once [[id number]]  (Normal) Collected: 11/04/21 0807    Specimen: Blood Updated: 11/04/21 0829     Glucose 126 mg/dL      Comment: : [id number] Preston LisaMeter ID: [id number]       Basic Metabolic Panel [[id number]]  (Abnormal) Collected: 11/04/21 0456    Specimen: Blood Updated: 11/04/21 0552     Glucose 115 mg/dL      BUN 11 mg/dL      Creatinine 0.70 mg/dL      Sodium 141 mmol/L      Potassium 3.5 mmol/L      Chloride 92 mmol/L      CO2 44.0 mmol/L      Calcium 9.1 mg/dL      eGFR Non African Amer 86 mL/min/1.73      BUN/Creatinine Ratio 15.7     Anion Gap 5.0 mmol/L     CBC (No Diff) [[id number]]  (Abnormal) Collected: 11/04/21 0456    Specimen: Blood Updated: 11/04/21 0538     WBC 13.72 10*3/mm3      RBC 4.61 10*6/mm3      Hemoglobin 10.7 g/dL      Hematocrit 36.6 %      MCV 79.4 fL      MCH 23.2 pg      MCHC 29.2 g/dL      RDW 15.5 %      RDW-SD 44.8 fl      MPV 9.8 fL      Platelets 298 10*3/mm3     POC Glucose Once [[id number]]  (Abnormal) Collected: 11/03/21 2031    Specimen: Blood Updated: 11/03/21 2101     Glucose 275 mg/dL      Comment: : [id number] Amador TerryMeter ID:  [id number]       POC Glucose Once [[id number]]  (Abnormal) Collected: 11/03/21 1602    Specimen: Blood Updated: 11/03/21 1651     Glucose 209 mg/dL      Comment: : [id number] Preston LisaMeter ID: [id number]       POC Glucose Once [[id number]]  (Abnormal) Collected: 11/03/21 1113    Specimen: Blood Updated: 11/03/21 1125     Glucose 228 mg/dL      Comment: : [id number] Preston LisaMeter ID: [id number]       SCANNED - LABS [[id number]] Resulted: 11/02/21     Updated: 11/03/21 1021    POC Glucose Once [[id number]]  (Abnormal) Collected: 11/03/21 0748    Specimen: Blood Updated: 11/03/21 0818     Glucose 283 mg/dL      Comment: : [id number] Preston LisaMeter ID: [id number]       Basic Metabolic Panel [[id number]]  (Abnormal) Collected: 11/03/21 0230    Specimen: Blood Updated: 11/03/21 0350     Glucose 321 mg/dL      BUN 8 mg/dL      Creatinine 0.65 mg/dL      Sodium 132 mmol/L      Potassium 4.2 mmol/L      Comment: Slight hemolysis detected by analyzer. Results may be affected.        Chloride 89 mmol/L      CO2 37.0 mmol/L      Calcium 9.0 mg/dL      eGFR Non African Amer 93 mL/min/1.73      BUN/Creatinine Ratio 12.3     Anion Gap 6.0 mmol/L     Phosphorus [[id number]]  (Normal) Collected: 11/03/21 0230    Specimen: Blood Updated: 11/03/21 0350     Phosphorus 3.6 mg/dL     Magnesium [[id number]]  (Normal) Collected: 11/03/21 0230    Specimen: Blood Updated: 11/03/21 0350     Magnesium 1.6 mg/dL     TSH [[id number]]  (Normal) Collected: 11/03/21 0230    Specimen: Blood Updated: 11/03/21 0350     TSH 1.820 uIU/mL     CBC Auto Differential [[id number]]  (Abnormal) Collected: 11/03/21 0230    Specimen: Blood Updated: 11/03/21 0339     WBC 10.15 10*3/mm3      RBC 4.61 10*6/mm3      Hemoglobin 10.9 g/dL      Hematocrit 36.9 %      MCV 80.0 fL      MCH 23.6 pg      MCHC 29.5 g/dL      RDW 15.3 %      RDW-SD 44.4 fl      MPV 10.3 fL      Platelets 263 10*3/mm3      Neutrophil % 94.6 %      Lymphocyte % 4.2 %      Monocyte %  0.6 %      Eosinophil % 0.0 %      Basophil % 0.2 %      Immature Grans % 0.4 %      Neutrophils, Absolute 9.60 10*3/mm3      Lymphocytes, Absolute 0.43 10*3/mm3      Monocytes, Absolute 0.06 10*3/mm3      Eosinophils, Absolute 0.00 10*3/mm3      Basophils, Absolute 0.02 10*3/mm3      Immature Grans, Absolute 0.04 10*3/mm3      nRBC 0.0 /100 WBC     Hemoglobin A1c [[id number]]  (Abnormal) Collected: 11/03/21 0230    Specimen: Blood Updated: 11/03/21 0337     Hemoglobin A1C 8.30 %     Urinalysis With Culture If Indicated - Urine, Catheter [[id number]]  (Abnormal) Collected: 11/02/21 1815    Specimen: Urine, Catheter Updated: 11/02/21 1829     Color, UA Yellow     Appearance, UA Clear     pH, UA 8.5     Specific Gravity, UA 1.007     Glucose, UA Negative     Ketones, UA Negative     Bilirubin, UA Negative     Blood, UA Negative     Protein, UA Negative     Leuk Esterase, UA Negative     Nitrite, UA Negative     Urobilinogen, UA 0.2 E.U./dL    Narrative:      Urine microscopic not indicated.    COVID-19,[facility] IN-HOUSE,Nasal Swab No Transport Media 3-4 HR TAT - Swab, Nasal Cavity [[id number]]  (Normal) Collected: 11/02/21 1557    Specimen: Swab from Nasal Cavity Updated: 11/02/21 1656     COVID19 Not Detected    Blood Gas, Arterial - [[id number]]  (Abnormal) Collected: 11/02/21 1635    Specimen: Arterial Blood Updated: 11/02/21 1642     Site Right Radial     David's Test Positive     pH, Arterial 7.396 pH units      pCO2, Arterial 71.5 mm Hg      Comment: 86 Value above critical limit        pO2, Arterial 91.7 mm Hg      HCO3, Arterial 43.8 mmol/L      Comment: 83 Value above reference range        Base Excess, Arterial 16.1 mmol/L      Comment: 83 Value above reference range        O2 Saturation, Arterial 97.6 %      Temperature 37.0 C      Barometric Pressure for Blood Gas 759 mmHg      Modality BiPap     FIO2 35 %      Ventilator Mode BiPAP     Set Mec Resp Rate 12.0     IPAP 12     Comment: Meter: [id number]      :  [id number]        EPA 6     Notified Who Robert Gramlisch RN     Notified By [id number]     Notified Time 11/02/2021 16:51     Collected by [id number]     pCO2, Temperature Corrected 71.5 mm Hg      pH, Temp Corrected 7.396 pH Units      pO2, Temperature Corrected 91.7 mm Hg     Procalcitonin [[id number]]  (Normal) Collected: 11/02/21 1557    Specimen: Blood Updated: 11/02/21 1637     Procalcitonin 0.04 ng/mL     Comprehensive Metabolic Panel [[id number]]  (Abnormal) Collected: 11/02/21 1557    Specimen: Blood Updated: 11/02/21 1633     Glucose 212 mg/dL      BUN 6 mg/dL      Creatinine 0.63 mg/dL      Sodium 137 mmol/L      Potassium 4.1 mmol/L      Chloride 93 mmol/L      CO2 40.0 mmol/L      Calcium 9.1 mg/dL      Total Protein 6.8 g/dL      Albumin 3.60 g/dL      ALT (SGPT) 8 U/L      AST (SGOT) 11 U/L      Alkaline Phosphatase 93 U/L      Total Bilirubin 0.5 mg/dL      eGFR Non African Amer 97 mL/min/1.73      Globulin 3.2 gm/dL      A/G Ratio 1.1 g/dL      BUN/Creatinine Ratio 9.5     Anion Gap 4.0 mmol/L             Troponin [[id number]]  (Normal) Collected: 11/02/21 1557    Specimen: Blood Updated: 11/02/21 1631     Troponin T <0.010 ng/mL             BNP [[id number]]  (Abnormal) Collected: 11/02/21 1557    Specimen: Blood Updated: 11/02/21 1630     proBNP 3,242.0 pg/mL     Lactic Acid, Plasma [[id number]]  (Normal) Collected: 11/02/21 1557    Specimen: Blood Updated: 11/02/21 1630     Lactate 0.8 mmol/L     Lipase [[id number]]  (Normal) Collected: 11/02/21 1557    Specimen: Blood Updated: 11/02/21 1629     Lipase 14 U/L     Magnesium [[id number]]  (Normal) Collected: 11/02/21 1557    Specimen: Blood Updated: 11/02/21 1628     Magnesium 1.8 mg/dL     D-dimer, Quantitative [[id number]]  (Abnormal) Collected: 11/02/21 1557    Specimen: Blood Updated: 11/02/21 1623     D-Dimer, Quantitative 0.85 mg/L (FEU)             Protime-INR [[id number]]  (Normal) Collected: 11/02/21 1557    Specimen: Blood Updated: 11/02/21  1623     Protime 13.4 Seconds      INR 1.06    aPTT [[id number]]  (Normal) Collected: 11/02/21 1557    Specimen: Blood Updated: 11/02/21 1623     PTT 34.0 seconds     CBC & Differential [[id number]]  (Abnormal) Collected: 11/02/21 1557    Specimen: Blood Updated: 11/02/21 1613            CBC Auto Differential [[id number]]  (Abnormal) Collected: 11/02/21 1557    Specimen: Blood Updated: 11/02/21 1613     WBC 10.10 10*3/mm3      RBC 4.44 10*6/mm3      Hemoglobin 11.0 g/dL      Hematocrit 36.9 %      MCV 83.1 fL      MCH 24.8 pg      MCHC 29.8 g/dL      RDW 15.2 %      RDW-SD 46.5 fl      MPV 9.8 fL      Platelets 267 10*3/mm3      Neutrophil % 83.7 %      Lymphocyte % 9.8 %      Monocyte % 5.7 %      Eosinophil % 0.0 %      Basophil % 0.3 %      Immature Grans % 0.5 %      Neutrophils, Absolute 8.45 10*3/mm3      Lymphocytes, Absolute 0.99 10*3/mm3      Monocytes, Absolute 0.58 10*3/mm3      Eosinophils, Absolute 0.00 10*3/mm3      Basophils, Absolute 0.03 10*3/mm3      Immature Grans, Absolute 0.05 10*3/mm3      nRBC 0.0 /100 WBC         Imaging Results (All)     Procedure Component Value Units Date/Time    CT Angiogram Chest [[id number]] Collected: 11/02/21 1922     Updated: 11/02/21 1935    Narrative:      CT chest angiogram dated 11/2/2021 6:57 PM CDT      HISTORY: Pulmonary most suspected. Positive d-dimer.     COMPARISON: None.      DLP: 520.1 mGy cm     TECHNIQUE: Helical tomographic images of the chest were obtained after  the administration of intravenous contrast following angiogram protocol.  Additionally, 3D MIP reconstructions in the coronal and sagittal planes  were provided.        FINDINGS:    The imaged portion of the base of the neck appears unremarkable.      There is adequate enhancement of the pulmonary arteries to evaluate for  central and segmental pulmonary emboli. There are no filling defects  within the main, lobar, segmental or visualized subsegmental pulmonary  arteries. The pulmonary vessels  are within normal limits.      There is linear atelectasis within both upper lobes as well as within  the right middle lobe and both lower lobes. There is more confluent  within the right lower lobe and could even represent a right lower lobe  pneumonia.. The trachea and bronchial tree are patent.      There is mild cardiomegaly. Moderate coronary calcifications are  present.. There is no pericardial effusion.      No enlarged axillary or mediastinal lymph nodes are present.      The osseous structures of the thorax and surrounding soft tissues  demonstrate no acute process.      There is elevation of the right diaphragm. Limited images of the upper  abdomen are otherwise unremarkable..        Impression:      1. No evidence of pulmonary thromboembolic disease. The thoracic aorta  and proximal great vessels are normal in caliber without evidence of  aneurysm or dissection.  2. Cardiomegaly with coronary calcifications. No evidence of pericardial  effusion.  3. There is platelike atelectasis within both lungs. There is elevation  of the right hemidiaphragm with more confluent right lower lobe  atelectasis or infiltrate. I would favor atelectasis..        This report was finalized on 11/02/2021 19:31 by Dr. Ronnie Newsome MD.    XR Chest 1 View [[id number]] Collected: 11/02/21 1555     Updated: 11/02/21 1559    Narrative:      EXAMINATION: XR CHEST 1 VW-     11/2/2021 3:54 PM CDT     HISTORY: Weakness     1 view chest x-ray.     Remote comparison from 10/28/2015.     Hypoaeration.  CABG changes.     Central vascular prominence suspicious for early failure.     Chronic right diaphragm elevation.     No pneumothorax.     Summary:  1. Central vascular and interstitial prominence suspicious for early  heart failure.  This report was finalized on 11/02/2021 15:56 by Dr. Pete West MD.        History of Present Illness on Day of Discharge:   Patient is alert in bed laying on right side. Head of bed is not elevated.  Patient states that she feels that she is breathing better, and her edema is slightly better. She expresses that she would like to have a bedside commode and a hospital bed at her own home. We discussed her needs for oxygen at home. She was previously only wearing 2 L at night however it appears that she will now need 1 L during the day at rest and 2 L with activity. She does have a concentrator at home as well as portable oxygen. She is agreeable to discharge today.    Hospital Course:  The patient is a 61 y.o. female who presented to [facility] with a chief complaint of shortness of breath. She has had worsening weight gain, fluid retention, wheezing, orthopnea, and paroxysmal nocturnal dyspnea. She also noted that her lower extremities had begun to swell and he was noticed swelling in her right side of her abdomen. In the ED the patient stated that she had a history of congestive heart failure since approximately 2004 2005 but does not recall ever having an echocardiogram.  CTA of the chest showed no evidence of pulmonary embolus.  Cardiomegaly noted with coronary calcifications.  Platelike atelectasis in both lungs.  BNP was elevated at 3242.  ABG showed pH of 7.39 and PCO2 of 71.  Patient was given diuretics and nitroglycerin in the ED to reduce her symptoms and then was she was placed on BiPAP and admitted to the hospitalist.    Patient was ordered an echocardiogram for evaluation of her congestive heart failure. The echo showed diastolic dysfunction with an ejection fraction of about 51 to 55%. She then began receiving Lasix and Diamox for diuresis. We plan to continue her daily Lasix on discharge for 3 days and then supply her with as needed dosing.  She has been given a scale to measure daily weights at home and has been instructed on when to use Lasix on an as-needed basis.    Patient is currently wearing 2 L of nasal cannula in bed. Previously she only wore oxygen at home at night with 2 L.  "Respiratory therapy evaluated her and discovered that she will also need to wear 1 L at rest during the day and 2 L with activity. We also encouraged patient to continue using her nebulizer with DuoNebs as needed to help with her shortness of breath.  We will begin to taper down her prednisone upon discharge.    We discontinued patient's clonidine in favor of Coreg for regulation of patient's blood pressures.  She will also continue lisinopril at home.    Patient requested bedside commode and a hospital bed upon discharge.  Believe this is justified due to her symptomatic heart failure.  Recommend head of bed is elevated at least 30 degrees to improve patient's orthopnea and paroxysmal nocturnal dyspnea.    Patient is agreeable to discharge home today.  She declines home health.  Patient is medically stable and ready for discharge.    Condition on Discharge: Medically stable    Physical Exam on Discharge:  /54 (BP Location: Right arm, Patient Position: Lying)   Pulse 62   Temp 98.3 °F (36.8 °C) (Oral)   Resp 16   Ht 165.1 cm (65\")   Wt 103 kg (226 lb)   SpO2 98%   BMI 37.61 kg/m²   Physical Exam   Physical Exam  Constitutional:       Appearance: Normal appearance.   HENT:      Head: Normocephalic and atraumatic.   Cardiovascular:      Rate and Rhythm: Normal rate and regular rhythm.      Heart sounds: Normal heart sounds.      Comments: Sinus arrhythmia 79-84; heart rate up to 145 for a few beats overnight  Pulmonary:      Breath sounds: Diminished breath sounds throughout  Abdominal:      General: Bowel sounds are normal.   Musculoskeletal:         General: Normal range of motion.      Right lower leg: No edema.      Left lower leg: No edema.      Comments: Edema on right side abdomen has improved but is still noticeable.   Skin:     General: Skin is warm and dry.      Findings: Erythema present.   Neurological:      General: No focal deficit present.      Mental Status: She is oriented to person, " place, and time. Mental status is at baseline.   Psychiatric:         Mood and Affect: Mood normal.         Behavior: Behavior normal.         Thought Content: Thought content normal.         Judgment: Judgment normal.     Discharge Disposition:  Home or Self Care    Discharge Medications:     Discharge Medications      New Medications      Instructions Start Date   carvedilol 6.25 MG tablet  Commonly known as: COREG   6.25 mg, Oral, 2 Times Daily With Meals      furosemide 40 MG tablet  Commonly known as: LASIX   Take 1 tablet by mouth daily for 3 days, then may use daily as needed for weight gain, shortness of breath, or swelling.      predniSONE 10 MG tablet  Commonly known as: DELTASONE   Take 2 tablets by mouth Daily for 3 days, THEN 1 tablet Daily for 3 days.   Start Date: November 5, 2021        Changes to Medications      Instructions Start Date   cloNIDine 0.1 MG tablet  Commonly known as: Catapres  What changed:   · when to take this  · reasons to take this   0.1 mg, Oral, 2 Times Daily PRN         Continue These Medications      Instructions Start Date   atorvastatin 10 MG tablet  Commonly known as: Lipitor   10 mg, Oral, Daily      fish oil 1000 MG capsule capsule   1,000 mg, Oral, 2 Times Daily With Meals      glipizide 5 MG tablet  Commonly known as: GLUCOTROL   5 mg, Oral, 2 times daily      ipratropium-albuterol 0.5-2.5 mg/3 ml nebulizer  Commonly known as: DUO-NEB   3 mL, Nebulization, Every 4 Hours PRN      lamoTRIgine 25 MG tablet  Commonly known as: LaMICtal   25 mg, Oral, 2 Times Daily, 1 pill daily for a week then 2 pills daily      lisinopril 10 MG tablet  Commonly known as: PRINIVIL,ZESTRIL   10 mg, Oral, Daily      loratadine 10 MG tablet  Commonly known as: CLARITIN   10 mg, Oral, Daily PRN      nitroglycerin 0.4 MG SL tablet  Commonly known as: NITROSTAT   0.4 mg, Sublingual, Daily PRN      omeprazole 40 MG capsule  Commonly known as: priLOSEC   40 mg, Oral, Daily      potassium chloride  10 MEQ CR tablet   10 mEq, Oral, Daily      Trelegy Ellipta 200-62.5-25 MCG/INH inhaler  Generic drug: Fluticasone-Umeclidin-Vilant   1 puff, Inhalation, Daily      venlafaxine  MG 24 hr capsule  Commonly known as: Effexor XR   300 mg, Oral, Daily      Ventolin  (90 Base) MCG/ACT inhaler  Generic drug: albuterol sulfate HFA   1 puff, Inhalation, Every 4 Hours PRN      Xultophy 100-3.6 UNIT-MG/ML solution pen-injector subcutaneous pen  Generic drug: Insulin Degludec-Liraglutide   30 Units, Injection, Nightly           Discharge Diet:   Diet Instructions     Diet: Consistent Carbohydrate, Cardiac; Thin      Discharge Diet:  Consistent Carbohydrate  Cardiac       Fluid Consistency: Thin        Activity at Discharge:   Activity Instructions     Activity as Tolerated          Discharge Care Plan/Instructions:   1.  Return if any new or worsening symptoms.  2.  Discontinue scheduled clonidine, take only as needed.  New medication Coreg.  3.  Take Lasix for 3 days, then take as needed for symptoms.  4.  Finish taking prednisone therapy in 6 days.  5.  Begin wearing daily oxygen at 1 L at rest and 2 L with activity.  Continue nocturnal oxygen at 2 L.  8.  Keep head of bed at 30 degrees at night.  Hospital bed ordered for home use.  Bedside commode ordered as well.    Follow-up Appointments:   1.  Dr. Streeter in 1 week.    Test Results Pending at Discharge: We will follow blood cultures to completion.  No growth to date.    Electronically signed by RICK Coronado, 11/5/2021, 10:00 CDT.    Time: 35 minutes

## 2021-11-05 NOTE — DISCHARGE PLACEMENT REQUEST
"From:  [place], [facility]  [phone]      Nora Carney (61 y.o. Female)             Date of Birth Social Security Number Address Home Phone MRN    1960  [facility]   [place] [address] [phone] [id number]    Scientologist Marital Status             Gnosticist        Admission Date Admission Type Admitting Provider Attending Provider Department, Room/Bed    11/2/21 Emergency Vinnie Carballo MD Fleming, John Eric, MD [facility] 4B, 412/1    Discharge Date Discharge Disposition Discharge Destination           Home or Self Care              Attending Provider: Vinnie Carballo MD    Allergies: Fluoxetine Hcl, Levofloxacin, Norfloxacin    Isolation: None   Infection: ESBL E coli (11/06/16)   Code Status: No CPR   Advance Care Planning Activity    Ht: 165.1 cm (65\")   Wt: 103 kg (226 lb)    Admission Cmt: None   Principal Problem: Acute on chronic diastolic CHF (congestive heart failure) (HCC) [I50.33]                 Active Insurance as of 11/2/2021     Primary Coverage     Payor Plan Insurance Group Employer/Plan Group    ANTHEM MEDICARE REPLACEMENT ANTHEM MEDICARE ADVANTAGE [id number]     Payor Plan Address Payor Plan Phone Number Payor Plan Fax Number Effective Dates    [address] [phone]  1/1/2020 - None Entered    [facility] [address]       Subscriber Name Subscriber Birth Date Member ID       NORA CARNEY 1960 [id number]           Secondary Coverage     Payor Plan Insurance Group Employer/Plan Group    [place]      Payor Plan Address Payor Plan Phone Number Payor Plan Fax Number Effective Dates    [address] [phone]  10/13/2020 - None Entered    [facility] [id number]       Subscriber Name Subscriber Birth Date Member ID       NORA CARNEY 1960 [id number]                 Emergency Contacts      (Rel.) Home Phone Work Phone Mobile Phone    Zay Carr (Daughter) [phone] -- --    " LOS MCFADDEN (Friend) -- -- [phone]    Merlyn Wen (Friend) -- -- [phone]        Hospital Bed [[id number]] (Order [id number])  Order  Date: 11/5/2021 Department: [address] Ordering/Authorizing: Nancy Rivera APRN       Order History  Outpatient  Date/Time Action Taken User Additional Information   11/05/21 1000 Sign Nancy Rivera APRN      Order Details    Frequency Duration Priority Order Class   None None Routine External     Start Date/Time    Start Date   11/05/21     Order Information    Order Date Service Start Date Start Time   11/05/21 Medicine 11/05/21      Reference Links    Associated Reports   View Encounter     Order Questions    Question Answer   Equipment:  Hospital Bed, Semi-Electirc w/ Mattress & w/ Rails   Length of Need (99 Months = Lifetime) 12 Months   Note: Custom values to enter length of need for DME            Source Order Set / Preference List    Order Set    IP GEN EXPRESS DISCHARGE [[id number]]     Clinical Indications     ICD-10-CM ICD-9-CM   Congestive heart failure, unspecified HF chronicity, unspecified heart failure type (HCC)     I50.9 428.0   Decreased activities of daily living (ADL)     Z78.9 V49.89   Decreased activity tolerance     R68.89 780.99     Reprint Order Requisition    Hospital Bed (Order #[id number]) on 11/5/21     Encounter    View Encounter            Order Provider Info        Office phone Pager E-mail   Ordering User Nancy Rivera APRN [phone] -- --   Authorizing Provider Nancy Rivera APRN [phone] -- --   Attending Provider Vinnie Carballo [phone] -- --     Tracking Reports    Cosign Tracking Order Transmittal Tracking   Authorized by:  RICK Coronado  (NPI: [id number])           Lab Component SmartPhrase Guide    Hospital Bed (Order #[id number]) on 11/5/21     Commode Chair [[id number]] (Order [id number])  Order  Date: 11/5/2021 Department: [address]  Ordering/Authorizing: Nancy Rivera APRN       Order History  Outpatient  Date/Time Action Taken User Additional Information   11/05/21 1000 Sign Nancy Rivera APRN      Order Details    Frequency Duration Priority Order Class   None None Routine External     Start Date/Time    Start Date   11/05/21     Order Information    Order Date Service Start Date Start Time   11/05/21 Medicine 11/05/21      Reference Links    Associated Reports   View Encounter     Order Questions    Question Answer   Equipment  Bedside Commode Chair w/Fixed Arms   Length of Need (99 Months = Lifetime) 12 Months   Note: Custom values to enter length of need for DME            Source Order Set / Preference List    Order Set    IP GEN EXPRESS DISCHARGE [[id number]]     Clinical Indications     ICD-10-CM ICD-9-CM   Decreased activities of daily living (ADL)     Z78.9 V49.89   Decreased activity tolerance     R68.89 780.99     Reprint Order Requisition    Commode Chair (Order #[id number]) on 11/5/21     Encounter    View Encounter            Order Provider Info        Office phone Pager E-mail   Ordering User Nancy Rivera APRN [phone] -- --   Authorizing Provider Nancy Rivera APRN [phone] -- --   Attending Provider Vinnie Carballo [phone] -- --     Tracking Reports    Cosign Tracking Order Transmittal Tracking   Authorized by:  RICK Coronado  (NPI: [id number])           Lab Component SmartPhrase Guide    Commode Chair (Order #[id number]) on 11/5/21     Home Oxygen Therapy [EQ60] (Order [id number])  Order  Date: 11/5/2021 Department: [address] Ordering/Authorizing: Nancy Rivera APRN       Order History  Outpatient  Date/Time Action Taken User Additional Information   11/05/21 1000 Sign Nancy Rivera APRN      Order Details    Frequency Duration Priority Order Class   None None Routine External     Start Date/Time    Start Date   11/05/21     Order  Information    Order Date Service Start Date Start Time   11/05/21 Medicine 11/05/21      Reference Links    Associated Reports   View Encounter     Order Questions    Question Answer Comment   Delivery Modality Nasal Cannula Patient needs 1 L at rest and 2 L with exertion   Liters Per Minute: 2    Duration: With Exertion    Equipment  Oxygen Concentrator &  &  Portable Gaseous Oxygen System & Portable Oxygen Contents Gaseous &  Conserving Regulator    Length of Need (99 Months = Lifetime) 99 Months = Lifetime    Note: Custom values to enter length of need for DME            Source Order Set / Preference List    Order Set   [facility] GEN EXPRESS DISCHARGE [[id number]]     Clinical Indications     ICD-10-CM ICD-9-CM   Acute respiratory failure with hypoxia and hypercapnia (HCC)  - Primary     J96.01  J96.02 518.81   COPD exacerbation (HCC)     J44.1 491.21     Reprint Order Requisition    Home Oxygen Therapy (Order #[id number]) on 11/5/21     Encounter    View Encounter            Order Provider Info        Office phone Pager E-mail   Ordering User Nancy Rivera APRN [phone] -- --   Authorizing Provider Nancy Rivera APRN [phone] -- --   Attending Provider Vinnie Carballo [phone] -- --     Tracking Reports    Cosign Tracking Order Transmittal Tracking   Authorized by:  RICK Coronado  (NPI: [id number])           Lab Component SmartPhrase Guide    Home Oxygen Therapy (Order #[id number]) on 11/5/21          History & Physical      Vinnie Carballo MD at 11/02/21 [address] [place] [address] Medicine Services  HISTORY AND PHYSICAL    Date of Admission: 11/2/2021  Primary Care Physician: Alejandra Chaudhari DO    Subjective     Chief Complaint: shortness of breath    History of Present Illness  Ms. Carney is a 61-year-old female with a past medical history of diabetes, COPD, and tobacco abuse.  Patient presents with a 2-week  history of worsening weight gain, fluid retention.  Patient also describes associated symptoms including wheezing, orthopnea, and paroxysmal nocturnal dyspnea.  Patient reports that her lower extremities have been swelling, she has noticed edema in her abdominal wall, and she has had significant dyspnea on exertion.  Patient denies any fever or chills.  Patient denies any recent sick contacts.  Patient indicates that she does have a past medical history of congestive heart failure, but does not recall having an echocardiogram.  She thinks that she had congestive heart failure in approximately 2004 or 2005.    For the patient's type 2 diabetes, the patient takes glipizide and xultophy. Last A1c was 7.6 in 7/19/21. She does report a history of some neuropathy but no other complications.     For the patients tobacco abuse, she expresses no interest in quitting at this time. She is aware of the potential adverse outcomes on her health especially with her comorbities. She indicates she has tried “everything” in the past to quit smoking but did not want to ellavorate.    In regards to the patient's COPD.  She reports that she wears oxygen at night, 2 L nasal cannula, she indicates that she does not wear this routinely, and frequently forgets to put it on.  The patient has not been hospitalized recently with any acute exacerbations.  Patient takes maintenance inhalers.        Review of Systems   Constitutional: Positive for fatigue. Negative for chills and fever.   Respiratory: Positive for cough and shortness of breath.    Cardiovascular: Positive for leg swelling. Negative for chest pain and palpitations.   Gastrointestinal: Positive for abdominal distention. Negative for abdominal pain, diarrhea, nausea and vomiting.   Genitourinary: Negative for dysuria.   Neurological: Positive for weakness.   All other systems reviewed and are negative.       Otherwise complete ROS reviewed and negative except as mentioned in the  HPI.    Past Medical History:   Past Medical History:   Diagnosis Date   • Anxiety    • Arthritis    • Depression    • Diabetes mellitus (HCC)    • GERD (gastroesophageal reflux disease)    • Heart problem    • Hyperlipidemia    • Memory problem    • Sleep apnea    • Visual impairment      Past Surgical History:  Past Surgical History:   Procedure Laterality Date   • CHOLECYSTECTOMY     • CORONARY ARTERY BYPASS GRAFT       Social History:  reports that she has been smoking cigarettes. She has a 36.00 pack-year smoking history. She has never used smokeless tobacco. She reports previous alcohol use. She reports that she does not use drugs.    Family History: family history includes Cancer in her mother; Diabetes in her maternal grandmother; Hyperlipidemia in her sister; Hypertension in her sister.       Allergies:  Allergies   Allergen Reactions   • Fluoxetine Hcl Shortness Of Breath   • Levofloxacin Anaphylaxis   • Norfloxacin Shortness Of Breath       Medications:  Prior to Admission medications    Medication Sig Start Date End Date Taking? Authorizing Provider   cloNIDine (Catapres) 0.1 MG tablet Take 1 tablet by mouth 2 (Two) Times a Day. 8/23/21  Yes Alejandra Chaudhari DO   omeprazole (priLOSEC) 40 MG capsule Take 1 capsule by mouth Daily. 8/19/21  Yes Alejandra Chaudhari DO   potassium chloride 10 MEQ CR tablet Take 1 tablet by mouth Daily. 10/21/21  Yes Alejandra Chaudhari DO   atorvastatin (Lipitor) 10 MG tablet Take 1 tablet by mouth Daily. 7/26/21   Alejandra Chaudhari DO   azithromycin (Zithromax Z-Nasim) 250 MG tablet Take 2 tablets by mouth on day 1, then 1 tablet daily on days 2-5 10/21/21   Alejandra Chaudhari DO   Dimethicone 2 % cream Apply 2 mg topically Daily. 7/20/21   Alejandra Chaudhari DO   Evolocumab 140 MG/ML solution prefilled syringe Inject 1 mL under the skin into the appropriate area as directed Every 14 (Fourteen) Days. 10/13/20   Alejandra Chaudhari DO   Fluticasone-Umeclidin-Vilant  (Trelegy Ellipta) 200-62.5-25 MCG/INH aerosol powder  Inhale 1 puff Daily. 3/1/21   Alejandra Chaudhari DO   furosemide (LASIX) 20 MG tablet Take 20 mg by mouth Daily. 8/28/20   Justin Hernandez MD   glipizide (GLUCOTROL) 5 MG tablet Take 1 tablet by mouth 2 (two) times a day. 3/1/21   Alejandra Chaudhari DO   ipratropium-albuterol (DUO-NEB) 0.5-2.5 mg/3 ml nebulizer Take 3 mL by nebulization Every 4 (Four) Hours As Needed for Wheezing. 10/13/20   Alejandra Chaudhari DO   lamoTRIgine (LaMICtal) 25 MG tablet Take 1 tablet by mouth 2 (Two) Times a Day. 1 pill daily for a week then 2 pills daily 7/20/21   Alejandra Chaudhari DO   lisinopril (PRINIVIL,ZESTRIL) 10 MG tablet TAKE 1 TABLET BY MOUTH DAILY. 10/19/21   Alejandra Chaudhari DO   loratadine (CLARITIN) 10 MG tablet Take 1 tablet by mouth Daily As Needed for Allergies. 10/21/21   Alejandra Chaudhari DO   methylPREDNISolone (MEDROL) 4 MG dose pack Take as directed on package instructions. 10/21/21   Alejandra Chaudhari DO   Misc. Devices (Rollator Ultra-Light) misc 1 application Daily. 10/27/20   Alejandra Chaudhari DO   nitroglycerin (NITROSTAT) 0.4 MG SL tablet Place 1 tablet under the tongue Daily As Needed for Chest Pain. 7/26/21   Alejandra Chaudhari DO   Omega-3 Fatty Acids (fish oil) 1000 MG capsule capsule Take 1,000 mg by mouth 2 (two) times a day. 6/18/20   Justin Hernandez MD   oxyCODONE-acetaminophen (Percocet)  MG per tablet Take 1 tablet by mouth Every 8 (Eight) Hours As Needed for Moderate Pain . 4/6/21   Alejandra Chaudhari DO   venlafaxine XR (Effexor XR) 150 MG 24 hr capsule Take 2 capsules by mouth Daily. 10/21/21   Alejandra Chaudhari DO   Ventolin  (90 Base) MCG/ACT inhaler Inhale 1 puff Every 4 (Four) Hours As Needed for Wheezing. 10/21/21   Alejandra Chaudhari DO   vitamin D (ERGOCALCIFEROL) 1.25 MG (08159 UT) capsule capsule Take 1 capsule by mouth 1 (One) Time Per Week. 7/26/21   Alejandra Chaudhari DO   [facility]  "100-3.6 UNIT-MG/ML solution pen-injector subcutaneous pen Inject 30 Units as directed Every Night. 8/28/20   Provider, MD LEXA Anderson have utilized all available immediate resources to obtain, update, and review the patient's current medications.    Objective     Vital Signs: /72 (BP Location: Left arm, Patient Position: Lying)   Pulse 86   Temp 98.1 °F (36.7 °C) (Oral)   Resp 20   Ht 165.1 cm (65\")   Wt 102 kg (225 lb 3.2 oz)   SpO2 98%   BMI 37.48 kg/m²   Physical Exam  Vitals and nursing note reviewed.   Constitutional:       Appearance: She is obese. She is ill-appearing.   HENT:      Head: Normocephalic and atraumatic.      Nose: No congestion or rhinorrhea.      Mouth/Throat:      Mouth: Mucous membranes are dry.      Pharynx: Oropharynx is clear.   Eyes:      General: No scleral icterus.     Conjunctiva/sclera: Conjunctivae normal.   Cardiovascular:      Rate and Rhythm: Normal rate and regular rhythm.      Heart sounds: No murmur heard.      Pulmonary:      Effort: Pulmonary effort is normal.      Breath sounds: No stridor. Rales present. No rhonchi.   Abdominal:      General: Abdomen is flat. Bowel sounds are normal. There is no distension.      Palpations: Abdomen is soft. There is no mass.      Tenderness: There is no abdominal tenderness.      Hernia: No hernia is present.   Musculoskeletal:      Right lower leg: Edema (trace) present.      Left lower leg: Edema (trace) present.   Skin:     General: Skin is warm and dry.      Coloration: Skin is pale. Skin is not jaundiced.      Comments: Anasarca; edema right lower abdomen with some erythema (low suspicion for cellulitis)   Neurological:      General: No focal deficit present.      Mental Status: She is alert.   Psychiatric:         Mood and Affect: Mood normal.         Behavior: Behavior normal.              Results Reviewed:  Lab Results (last 24 hours)     Procedure Component Value Units Date/Time    Urinalysis With Culture If " Indicated - Urine, Catheter [[id number]] Collected: 11/02/21 1815    Specimen: Urine, Catheter Updated: 11/02/21 1823    COVID-19,[facility] IN-HOUSE,Nasal Swab No Transport Media 3-4 HR TAT - Swab, Nasal Cavity [[id number]]  (Normal) Collected: 11/02/21 1557    Specimen: Swab from Nasal Cavity Updated: 11/02/21 1656     COVID19 Not Detected    Narrative:      Fact sheet for providers: https://www.fda.gov/media/992439/download     Fact sheet for patients: https://www.[place].gov/media/656756/download    Test performed by PCR.    Consider negative results in combination with clinical observations, patient history, and epidemiological information.  Fact sheet for providers: https://www.fda.gov/media/159524/download     Fact sheet for patients: https://www.[place].gov/media/728316/download    Test performed by PCR.    Consider negative results in combination with clinical observations, patient history, and epidemiological information.    Blood Gas, Arterial - [[id number]]  (Abnormal) Collected: 11/02/21 1635    Specimen: Arterial Blood Updated: 11/02/21 1642     Site Right Radial     David's Test Positive     pH, Arterial 7.396 pH units      pCO2, Arterial 71.5 mm Hg      Comment: 86 Value above critical limit        pO2, Arterial 91.7 mm Hg      HCO3, Arterial 43.8 mmol/L      Comment: 83 Value above reference range        Base Excess, Arterial 16.1 mmol/L      Comment: 83 Value above reference range        O2 Saturation, Arterial 97.6 %      Temperature 37.0 C      Barometric Pressure for Blood Gas 759 mmHg      Modality BiPap     FIO2 35 %      Ventilator Mode BiPAP     Set [facility] Resp Rate 12.0     IPAP 12     Comment: Meter: [id number]     :  [id number]        EPAP 6     Notified Who Robert Gramlisch RN     Notified By [id number]     Notified Time 11/02/2021 16:51     Collected by [id number]     pCO2, Temperature Corrected 71.5 mm Hg      pH, Temp Corrected 7.396 pH Units      pO2, Temperature Corrected 91.7 mm Hg      "Procalcitonin [[id number]]  (Normal) Collected: 11/02/21 1557    Specimen: Blood Updated: 11/02/21 1637     Procalcitonin 0.04 ng/mL     Narrative:      As a Marker for Sepsis (Non-Neonates):     1. <0.5 ng/mL represents a low risk of severe sepsis and/or septic shock.  2. >2 ng/mL represents a high risk of severe sepsis and/or septic shock.    As a Marker for Lower Respiratory Tract Infections that require antibiotic therapy:  PCT on Admission     Antibiotic Therapy             6-12 Hrs later  >0.5                          Strongly Recommended            >0.25 - <0.5             Recommended  0.1 - 0.25                  Discouraged                       Remeasure/reassess PCT  <0.1                         Strongly Discouraged         Remeasure/reassess PCT      As 28 day mortality risk marker: \"Change in Procalcitonin Result\" (>80% or <=80%) if Day 0 (or Day 1) and Day 4 values are available. Refer to http://www.Icebergpct-calculator.com/    Change in PCT <=80 %   A decrease of PCT levels below or equal to 80% defines a positive change in PCT test result representing a higher risk for 28-day all-cause mortality of patients diagnosed with severe sepsis or septic shock.    Change in PCT >80 %   A decrease of PCT levels of more than 80% defines a negative change in PCT result representing a lower risk for 28-day all-cause mortality of patients diagnosed with severe sepsis or septic shock.                Comprehensive Metabolic Panel [[id number]]  (Abnormal) Collected: 11/02/21 1557    Specimen: Blood Updated: 11/02/21 1633     Glucose 212 mg/dL      BUN 6 mg/dL      Creatinine 0.63 mg/dL      Sodium 137 mmol/L      Potassium 4.1 mmol/L      Chloride 93 mmol/L      CO2 40.0 mmol/L      Calcium 9.1 mg/dL      Total Protein 6.8 g/dL      Albumin 3.60 g/dL      ALT (SGPT) 8 U/L      AST (SGOT) 11 U/L      Alkaline Phosphatase 93 U/L      Total Bilirubin 0.5 mg/dL      eGFR Non African Amer 97 mL/min/1.73      Globulin 3.2 " gm/dL      A/G Ratio 1.1 g/dL      BUN/Creatinine Ratio 9.5     Anion Gap 4.0 mmol/L     Narrative:      GFR Normal >60  Chronic Kidney Disease <60  Kidney Failure <15      Troponin [[id number]]  (Normal) Collected: 11/02/21 1557    Specimen: Blood Updated: 11/02/21 1631     Troponin T <0.010 ng/mL     Narrative:      Troponin T Reference Range:  <= 0.03 ng/mL-   Negative for AMI  >0.03 ng/mL-     Abnormal for myocardial necrosis.  Clinicians would have to utilize clinical acumen, EKG, Troponin and serial changes to determine if it is an Acute Myocardial Infarction or myocardial injury due to an underlying chronic condition.       Results may be falsely decreased if patient taking Biotin.      BNP [[id number]]  (Abnormal) Collected: 11/02/21 1557    Specimen: Blood Updated: 11/02/21 1630     proBNP 3,242.0 pg/mL     Narrative:      Among patients with dyspnea, NT-proBNP is highly sensitive for the detection of acute congestive heart failure. In addition NT-proBNP of <300 pg/ml effectively rules out acute congestive heart failure with 99% negative predictive value.    Results may be falsely decreased if patient taking Biotin.      Lactic Acid, Plasma [[id number]]  (Normal) Collected: 11/02/21 1557    Specimen: Blood Updated: 11/02/21 1630     Lactate 0.8 mmol/L     Lipase [[id number]]  (Normal) Collected: 11/02/21 1557    Specimen: Blood Updated: 11/02/21 1629     Lipase 14 U/L     Magnesium [[id number]]  (Normal) Collected: 11/02/21 1557    Specimen: Blood Updated: 11/02/21 1628     Magnesium 1.8 mg/dL     D-dimer, Quantitative [[id number]]  (Abnormal) Collected: 11/02/21 1557    Specimen: Blood Updated: 11/02/21 1623     D-Dimer, Quantitative 0.85 mg/L (FEU)     Narrative:      Reference Range is 0-0.50 mg/L FEU. However, results <0.50 mg/L FEU tends to rule out DVT or PE. Results >0.50 mg/L FEU are not useful in predicting absence or presence of DVT or PE.      Protime-INR [[id number]]  (Normal) Collected: 11/02/21 1557     Specimen: Blood Updated: 11/02/21 1623     Protime 13.4 Seconds      INR 1.06    aPTT [[id number]]  (Normal) Collected: 11/02/21 1557    Specimen: Blood Updated: 11/02/21 1623     PTT 34.0 seconds     CBC & Differential [[id number]]  (Abnormal) Collected: 11/02/21 1557    Specimen: Blood Updated: 11/02/21 1613    Narrative:      The following orders were created for panel order CBC & Differential.  Procedure                               Abnormality         Status                     ---------                               -----------         ------                     CBC Auto Differential[[id number]]        Abnormal            Final result                 Please view results for these tests on the individual orders.    CBC Auto Differential [[id number]]  (Abnormal) Collected: 11/02/21 1557    Specimen: Blood Updated: 11/02/21 1613     WBC 10.10 10*3/mm3      RBC 4.44 10*6/mm3      Hemoglobin 11.0 g/dL      Hematocrit 36.9 %      MCV 83.1 fL      MCH 24.8 pg      MCHC 29.8 g/dL      RDW 15.2 %      RDW-SD 46.5 fl      MPV 9.8 fL      Platelets 267 10*3/mm3      Neutrophil % 83.7 %      Lymphocyte % 9.8 %      Monocyte % 5.7 %      Eosinophil % 0.0 %      Basophil % 0.3 %      Immature Grans % 0.5 %      Neutrophils, Absolute 8.45 10*3/mm3      Lymphocytes, Absolute 0.99 10*3/mm3      Monocytes, Absolute 0.58 10*3/mm3      Eosinophils, Absolute 0.00 10*3/mm3      Basophils, Absolute 0.03 10*3/mm3      Immature Grans, Absolute 0.05 10*3/mm3      nRBC 0.0 /100 WBC     Blood Culture - Blood, Hand, Right [[id number]] Collected: 11/02/21 1558    Specimen: Blood from Hand, Right Updated: 11/02/21 1610    Blood Culture - Blood, Arm, Right [[id number]] Collected: 11/02/21 1550    Specimen: Blood from Arm, Right Updated: 11/02/21 1610        Imaging Results (Last 24 Hours)     Procedure Component Value Units Date/Time    XR Chest 1 View [[id number]] Collected: 11/02/21 1555     Updated: 11/02/21 1559    Narrative:       EXAMINATION: XR CHEST 1 VW-     11/2/2021 3:54 PM CDT     HISTORY: Weakness     1 view chest x-ray.     Remote comparison from 10/28/2015.     Hypoaeration.  CABG changes.     Central vascular prominence suspicious for early failure.     Chronic right diaphragm elevation.     No pneumothorax.     Summary:  1. Central vascular and interstitial prominence suspicious for early  heart failure.  This report was finalized on 11/02/2021 15:56 by Dr. Pete West MD.        I have personally reviewed and interpreted the radiology studies and ECG obtained at time of admission.     Assessment / Plan     Assessment:   Active Hospital Problems    Diagnosis    • Acute respiratory failure with hypoxia and hypercapnia (HCC)    • Acute congestive heart failure (HCC)      Plan:   Plan:    Admit patient to telemetry    Patient for some reason was started on nitroglycerin drip and placed on BiPAP in the emergency department.  Do not know as though nitroglycerin will be any of assistance at this time, this patient does not have any noted systolic heart failure, and her blood pressure is not elevated.  Patient was saturating in the mid 90% on 2 L nasal cannula prior to BiPAP, do not think that she needs the BiPAP at this juncture.  We will transition to regular nasal cannula.    Suspect that the patient has volume overload in addition to COPD exacerbation based on clinical presentation as well as the signs and symptoms.    For the patient's COPD exacerbation, she was given Solu-Medrol 125 mg in the emergency department.  We will continue prednisone 40 mg daily.  Azithromycin 500 mg x 3 days.  Bronchodilators with DuoNeb's.    For the patient's volume overload, she received Lasix 40 mg in the emergency department.  I will continue 40 mg IV twice daily.  Echocardiogram.  Strict input and output.  Daily weights.    For the patient's diabetes, we will check a hemoglobin A1c.  Hold her home medications.  Sliding scale insulin with before  every meal/nightly Accu-Cheks.         Code Status/Advanced Care Plan: DNR/DNI    The patient's surrogate decision maker is Zay Carr (Daughter).     I discussed my findings and recommendations with the patient and bedside RN .    Estimated length of stay is >2 days.     The patient was seen and examined by me on 11/02/2021 at 1828.    Electronically signed by Vinnie Carballo MD, 11/02/21, 18:28 CDT.              Electronically signed by Vinnie Carballo MD at 11/02/21 2132       Prior to Admission Medications     Prescriptions Last Dose Informant Patient Reported? Taking?    atorvastatin (Lipitor) 10 MG tablet 11/2/2021 Pharmacy No Yes    Take 1 tablet by mouth Daily.    Fluticasone-Umeclidin-Vilant (Trelegy Ellipta) 200-62.5-25 MCG/INH aerosol powder  11/2/2021 Self No Yes    Inhale 1 puff Daily.    glipizide (GLUCOTROL) 5 MG tablet 11/2/2021 Pharmacy No Yes    Take 1 tablet by mouth 2 (two) times a day.    ipratropium-albuterol (DUO-NEB) 0.5-2.5 mg/3 ml nebulizer 11/2/2021 Pharmacy No Yes    Take 3 mL by nebulization Every 4 (Four) Hours As Needed for Wheezing.    lamoTRIgine (LaMICtal) 25 MG tablet 11/2/2021 Pharmacy No Yes    Take 1 tablet by mouth 2 (Two) Times a Day. 1 pill daily for a week then 2 pills daily    lisinopril (PRINIVIL,ZESTRIL) 10 MG tablet 11/2/2021 Pharmacy Yes Yes    Take 10 mg by mouth Daily.    loratadine (CLARITIN) 10 MG tablet 11/2/2021 Pharmacy No Yes    Take 1 tablet by mouth Daily As Needed for Allergies.    Omega-3 Fatty Acids (fish oil) 1000 MG capsule capsule 11/2/2021 Self Yes Yes    Take 1,000 mg by mouth 2 (Two) Times a Day With Meals.    omeprazole (priLOSEC) 40 MG capsule 11/2/2021 Pharmacy No Yes    Take 1 capsule by mouth Daily.    potassium chloride 10 MEQ CR tablet 11/2/2021 Pharmacy No Yes    Take 1 tablet by mouth Daily.    venlafaxine XR (Effexor XR) 150 MG 24 hr capsule 11/2/2021 Pharmacy No Yes    Take 2 capsules by mouth Daily.    Ventolin  (90  Base) MCG/ACT inhaler 11/2/2021 Pharmacy No Yes    Inhale 1 puff Every 4 (Four) Hours As Needed for Wheezing.    Xultophy 100-3.6 UNIT-MG/ML solution pen-injector subcutaneous pen 11/1/2021 Pharmacy Yes Yes    Inject 30 Units as directed Every Night.    cloNIDine (Catapres) 0.1 MG tablet 11/2/2021 Pharmacy No Yes    Take 1 tablet by mouth 2 (Two) Times a Day.    nitroglycerin (NITROSTAT) 0.4 MG SL tablet  Pharmacy No No    Place 1 tablet under the tongue Daily As Needed for Chest Pain.          Current Facility-Administered Medications   Medication Dose Route Frequency Provider Last Rate Last Admin   • acetaminophen (TYLENOL) tablet 650 mg  650 mg Oral Q4H PRN Vinnie Carballo MD       • atorvastatin (LIPITOR) tablet 10 mg  10 mg Oral Nightly Nancy Rivera APRN   10 mg at 11/04/21 2113   • budesonide-formoterol (SYMBICORT) 160-4.5 MCG/ACT inhaler 2 puff  2 puff Inhalation BID - RT Nancy Rivera APRN   2 puff at 11/05/21 0618   • carvedilol (COREG) tablet 6.25 mg  6.25 mg Oral BID With Meals Vinnie Carballo MD   6.25 mg at 11/05/21 0824   • cetirizine (zyrTEC) tablet 10 mg  10 mg Oral Daily Nancy Rivera, APRN   10 mg at 11/05/21 0824   • dextrose (D50W) (25 g/50 mL) IV injection 25 g  25 g Intravenous Q15 Min PRN Vinnie Carballo MD       • dextrose (GLUTOSE) oral gel 15 g  15 g Oral Q15 Min PRN Vinnie Carballo MD       • enoxaparin (LOVENOX) syringe 40 mg  40 mg Subcutaneous Q24H Vinnie Carballo MD   40 mg at 11/04/21 2113   • furosemide (LASIX) tablet 40 mg  40 mg Oral Daily Nancy Rivera APRN   40 mg at 11/05/21 0824   • glipizide (GLUCOTROL) tablet 5 mg  5 mg Oral BID AC Nancy Rivera APRN   5 mg at 11/05/21 0824   • glucagon (human recombinant) (GLUCAGEN DIAGNOSTIC) injection 1 mg  1 mg Subcutaneous Q15 Min PRN Vinnie Carballo MD       • insulin lispro (humaLOG) injection 0-24 Units  0-24 Units Subcutaneous 4x Daily With Meals & Nightly Nancy Rivera APRN    16 Units at 11/04/21 2113   • ipratropium-albuterol (DUO-NEB) nebulizer solution 3 mL  3 mL Nebulization 4x Daily - RT Vinnie Carballo MD   3 mL at 11/05/21 1004   • lamoTRIgine (LaMICtal) tablet 25 mg  25 mg Oral BID Nancy Rivera APRN   25 mg at 11/05/21 0824   • lisinopril (PRINIVIL,ZESTRIL) tablet 10 mg  10 mg Oral Daily Nancy Rivera APRN   10 mg at 11/05/21 0824   • ondansetron (ZOFRAN) tablet 4 mg  4 mg Oral Q6H PRN Vinnie Carballo MD        Or   • ondansetron (ZOFRAN) injection 4 mg  4 mg Intravenous Q6H PRN Vinnie Carballo MD       • pantoprazole (PROTONIX) EC tablet 40 mg  40 mg Oral QAM AC Nancy Rivera APRN   40 mg at 11/05/21 0823   • predniSONE (DELTASONE) tablet 40 mg  40 mg Oral Daily With Breakfast Vinnie Carballo MD   40 mg at 11/05/21 0824   • sodium chloride 0.9 % flush 10 mL  10 mL Intravenous Q12H Vinnie Carballo MD   10 mL at 11/05/21 0825   • sodium chloride 0.9 % flush 10 mL  10 mL Intravenous PRN Vinnie Carballo MD       • venlafaxine XR (EFFEXOR-XR) 24 hr capsule 300 mg  300 mg Oral Daily Nancy Rivera APRN   300 mg at 11/05/21 0824     Operative/Procedure Notes (most recent note)    No notes of this type exist for this encounter.            Physician Progress Notes (most recent note)      Nancy Rivera APRN at 11/04/21 0917     Attestation signed by Vinnie Carballo MD at 11/04/21 1834    I personally evaluated and examined the patient in conjunction with RICK Rueda and agree with the assessment, treatment plan, and disposition of the patient as recorded by her. My history, exam, and further recommendations are:     Patient seen and examined at bedside.     Patient says she feels quite a bit better.  Patient does not participate in her care a lot.  Cannot get her to lay on her left side much.  Not even utilizing her breahting treatment in the room.  Treid to motivate patient without susccess.      Electronically signed by  Vinnie Carballo MD, 11/4/2021, 18:33 CDT.                      [facility]  INPATIENT PROGRESS NOTE    Length of Stay: 0  Date of Admission: 11/2/2021  Primary Care Physician: Alejandra Chaudhari DO    Subjective   Chief Complaint: Follow-up  HPI   Patient is sleeping in bed, lying flat. She is much more easily aroused than yesterday.  She states that she is breathing better than yesterday.  She says has been staying off of her right side more today, and it does appear to be less edematous.  Lower extremity edema is better today as well. She denies having any current chest pain.     Review of Systems   All pertinent negatives and positives are as above. All other systems have been reviewed and are negative unless otherwise stated.     Objective    Temp:  [97.6 °F (36.4 °C)-98.2 °F (36.8 °C)] 98 °F (36.7 °C)  Heart Rate:  [] 93  Resp:  [16-20] 16  BP: (123-153)/(50-69) 123/50  Physical Exam  Constitutional:       Appearance: Normal appearance.   HENT:      Head: Normocephalic and atraumatic.   Cardiovascular:      Rate and Rhythm: Normal rate and regular rhythm.      Heart sounds: Normal heart sounds.      Comments: Sinus rhythm   Pulmonary:      Breath sounds: Wheezing (bilateral expiratory wheeze) and rales (Right lower lobe) present.   Abdominal:      General: Bowel sounds are normal.   Musculoskeletal:         General: Normal range of motion.      Right lower leg: No edema.      Left lower leg: No edema.      Comments: Edema and right side abdomen has improved but is still noticeable.   Skin:     General: Skin is warm and dry.      Findings: Erythema (right abdominal wall-improved today) present.   Neurological:      General: No focal deficit present.      Mental Status: She is oriented to person, place, and time. Mental status is at baseline.   Psychiatric:         Mood and Affect: Mood normal.         Behavior: Behavior normal.         Thought  Content: Thought content normal.         Judgment: Judgment normal.     Results Review:  I have reviewed the labs, radiology results, and diagnostic studies.    Laboratory Data:   Results from last 7 days   Lab Units 11/04/21  0456 11/03/21  0230 11/02/21  1557   WBC 10*3/mm3 13.72* 10.15 10.10   HEMOGLOBIN g/dL 10.7* 10.9* 11.0*   HEMATOCRIT % 36.6 36.9 36.9   PLATELETS 10*3/mm3 298 263 267        Results from last 7 days   Lab Units 11/04/21  0456 11/03/21  0230 11/02/21  1557   SODIUM mmol/L 141 132* 137   POTASSIUM mmol/L 3.5 4.2 4.1   CHLORIDE mmol/L 92* 89* 93*   CO2 mmol/L 44.0* 37.0* 40.0*   BUN mg/dL 11 8 6   CREATININE mg/dL 0.70 0.65 0.63   CALCIUM mg/dL 9.1 9.0 9.1   BILIRUBIN mg/dL  --   --  0.5   ALK PHOS U/L  --   --  93   ALT (SGPT) U/L  --   --  8   AST (SGOT) U/L  --   --  11   GLUCOSE mg/dL 115* 321* 212*     I have reviewed the patient current medications.     Assessment/Plan     Active Hospital Problems    Diagnosis    • **Acute on chronic diastolic CHF (congestive heart failure) (HCC)    • Acute respiratory failure with hypoxia (HCC)    • History of AVNRT (AV karl re-entry tachycardia) (HCC) s/p ablation     • Tobacco abuse    • Obesity (BMI 30-39.9)    • COPD with acute exacerbation (HCC)    • Acute pulmonary edema (HCC)      Plan:  1.  Patient arrived to ED on 11/2/2021 with a chief complaint of shortness of breath.  She also presents with complaints of worsening weight gain, fluid retention, wheezing, orthopnea, and paroxysmal nocturnal dyspnea.  She also notes that her lower extremities have began to swell and has even noticed edema in her abdominal wall.  Patient indicated in the ED that she had a past medical history containing congestive heart failure since approximately 2004 or 2005 but doesn't recall having an echocardiogram.  Patient was then given diuretics and nitroglycerin in the ED to reduce symptoms.  She was also placed on BiPAP and admitted to hospitalist.  2.  Patient was  ordered an echocardiogram for evaluation of possible congestive heart failure.  The echo showed diastolic dysfunction with an ejection fraction of 51 to 55%.  She has been receiving 40 mg of IV Lasix twice daily and discontinued yesterday.  After evaluating her today we decided to place the patient on oral Lasix 40 mg daily and IV Diamox x1 today for treatment of diastolic heart failure. Continue strict intake and output, daily weights, cardiac diet.  Would likely plan to discharge on as needed dosing of Lasix.  Patient to be given a scale per case management.  3.  Patient is currently on 2 L nasal cannula, she does not wear oxygen at home during the day but does wear 2 L at night.  She is also receiving DuoNeb, prednisone, and azithromycin for treatment of COPD exacerbation. Plan to treat with 3 doses of azithromycin-today will complete third dose.  We also plan to wean the patient off of her oxygen during the day as that is not her home regimen.  May need home oxygen evaluation prior to discharge if unable to wean. Encourage use of flutter valve.  4.  Previous blood glucose levels-209, 275, 115. Continue glipizide and high-dose sliding scale insulin to correct.  Hemoglobin A1c 8.3%.  5.  Reviewed patient's previous blood pressures. Continue lisinopril and clonidine.  6.  Lovenox for DVT prophylaxis.  7.  Labs in a.m.  8.  Encourage patient to increase activity as tolerated.    Discharge Planning: I expect the patient to be discharged to home in 1-2 days.    Electronically signed by RICK Coronado, 11/4/2021, 09:17 CDT.      Electronically signed by Vinnie Carballo MD at 11/04/21 4375       Consult Notes (most recent note)    No notes of this type exist for this encounter.

## 2021-11-05 NOTE — PROGRESS NOTES
Exercise Oximetry    Patient Name:Nora Carney   MRN: [id number]   Date: 11/05/21             ROOM AIR BASELINE   SpO2%  88   Heart Rate 78   Blood Pressure      EXERCISE ON ROOM AIR SpO2% EXERCISE ON O2 @ 2 LPM SpO2%   1 MINUTE  1 MINUTE   95   2 MINUTES  2 MINUTES   95   3 MINUTES  3 MINUTES   94   4 MINUTES  4 MINUTES   95   5 MINUTES  5 MINUTES    6 MINUTES  6 MINUTES               Distance Walked   Distance Walked 100 feet   Dyspnea (Salazar Scale)   Dyspnea (Salazar Scale)  0   Fatigue (Salazar Scale)   Fatigue (Salazar Scale)  0   SpO2% Post Exercise   SpO2% Post Exercise   1 lpm 95   HR Post Exercise   HR Post Exercise         66   Time to Recovery   Time to Recovery  3 minutes     Comments:   Recommend 1 lpm/nc at rest and 2 lpm with exercise.

## 2021-11-06 NOTE — OUTREACH NOTE
Prep Survey      Responses   [facility] facility patient discharged from? [place]   Is LACE score < 7 ? No   Emergency Room discharge w/ pulse ox? No   Eligibility [facility]   Date of Admission 11/02/21   Date of Discharge 11/05/21   Discharge Disposition Home or Self Care   Discharge diagnosis A/C CHF with pulmonary edema,  acute hypoxic resp failure, Acute COPD exacerbation   Does the patient have one of the following disease processes/diagnoses(primary or secondary)? CHF   Does the patient have Home health ordered? No   Is there a DME ordered? Yes   What DME was ordered? hospital bed, BSC, [facility]   Prep survey completed? Yes          Radha Youssef RN

## 2021-11-07 LAB
BACTERIA SPEC AEROBE CULT: NORMAL
BACTERIA SPEC AEROBE CULT: NORMAL

## 2021-11-08 ENCOUNTER — TRANSITIONAL CARE MANAGEMENT TELEPHONE ENCOUNTER (OUTPATIENT)
Dept: CALL CENTER | Facility: HOSPITAL | Age: 61
End: 2021-11-08

## 2021-11-08 NOTE — OUTREACH NOTE
Call Center TCM Note      Responses   [facility] patient discharged from? [place]   Does the patient have one of the following disease processes/diagnoses(primary or secondary)? CHF   TCM attempt successful? No  [release is out of date. ]   Unsuccessful attempts Attempt 1           Durga Kay RN    11/8/2021, 11:00 EST

## 2021-11-08 NOTE — OUTREACH NOTE
Call Center TCM Note      Responses   [facility] patient discharged from? [place]   Does the patient have one of the following disease processes/diagnoses(primary or secondary)? CHF   TCM attempt successful? No   Unsuccessful attempts Attempt 2           Durga Kay RN    11/8/2021, 11:23 EST

## 2021-11-09 ENCOUNTER — TRANSITIONAL CARE MANAGEMENT TELEPHONE ENCOUNTER (OUTPATIENT)
Dept: CALL CENTER | Facility: HOSPITAL | Age: 61
End: 2021-11-09

## 2021-11-09 NOTE — OUTREACH NOTE
Call Center TCM Note      Responses   [facility] patient discharged from? [place]   Does the patient have one of the following disease processes/diagnoses(primary or secondary)? CHF   TCM attempt successful? Yes   Call start time 1021   Call end time 1030   Discharge diagnosis A/C CHF with pulmonary edema,  acute hypoxic resp failure, Acute COPD exacerbation   Meds reviewed with patient/caregiver? Yes   Is the patient having any side effects they believe may be caused by any medication additions or changes? No   Does the patient have all medications ordered at discharge? Yes   Is the patient taking all medications as directed (includes completed medication regime)? Yes   Does the patient have a primary care provider?  Yes   Does the patient have an appointment with their PCP within 7 days of discharge? Yes   Comments regarding PCP [facility] on 11/12/21 with PCP    Has the patient kept scheduled appointments due by today? N/A   What DME was ordered? hospital bed, BSC, new O2 settingf from [facility]   Has all DME been delivered? Yes   Pulse Ox monitoring Intermittent   Pulse Ox device source Patient   O2 Sat comments 92-96% on 2L    O2 Sat: education provided Sat levels,  When to seek care,  Monitoring frequency   Psychosocial issues? No   Did the patient receive a copy of their discharge instructions? Yes   Nursing interventions Reviewed instructions with patient   What is the patient's perception of their health status since discharge? Improving   Nursing interventions Nurse provided patient education   Is the patient weighing daily? Yes   Does the patient have scales? Yes   Daily weight interventions Education provided on importance of daily weight   Is the patient able to teach back Heart Failure diet management? Yes   Is the patient able to teach back Heart Failure Zones? Yes   Is the patient able to teach back signs and symptoms of worsening condition? (i.e. weight gain, shortness of air, etc.) Yes   If  the patient is a current smoker, are they able to teach back resources for cessation? [id number]   Is the patient/caregiver able to teach back the hierarchy of who to call/visit for symptoms/problems? PCP, Specialist, Home health nurse, Urgent Care, ED, 911 Yes   TCM call completed? Yes           Alyssa Blackwell RN    11/9/2021, 10:38 EST

## 2021-11-10 ENCOUNTER — TELEPHONE (OUTPATIENT)
Dept: INTERNAL MEDICINE | Facility: CLINIC | Age: 61
End: 2021-11-10

## 2021-11-10 NOTE — TELEPHONE ENCOUNTER
Caller: Nora Carney    Relationship to patient: Self    Best call back number: [phone]    Patient is needing to speak with Dr. Terry nurse. She is confused about some things regarding her recent hospital visit.

## 2021-11-10 NOTE — TELEPHONE ENCOUNTER
Spoke with pt,  she was just checking on her apt, and who it was with,  she thought she had 2 different apts at same time.   And she is going to do a UDS at her apt as well.

## 2021-11-15 ENCOUNTER — READMISSION MANAGEMENT (OUTPATIENT)
Dept: CALL CENTER | Facility: HOSPITAL | Age: 61
End: 2021-11-15

## 2021-11-15 NOTE — OUTREACH NOTE
CHF Week 2 Survey      Responses   [facility] patient discharged from? [place]   Does the patient have one of the following disease processes/diagnoses(primary or secondary)? CHF   Week 2 attempt successful? No   Unsuccessful attempts Attempt 1          Kathy Pedraza RN

## 2021-11-22 ENCOUNTER — OFFICE VISIT (OUTPATIENT)
Dept: INTERNAL MEDICINE | Facility: CLINIC | Age: 61
End: 2021-11-22

## 2021-11-22 VITALS
DIASTOLIC BLOOD PRESSURE: 69 MMHG | SYSTOLIC BLOOD PRESSURE: 106 MMHG | RESPIRATION RATE: 16 BRPM | HEIGHT: 65 IN | HEART RATE: 77 BPM | WEIGHT: 212 LBS | OXYGEN SATURATION: 96 % | TEMPERATURE: 97.5 F | BODY MASS INDEX: 35.32 KG/M2

## 2021-11-22 DIAGNOSIS — R53.83 FATIGUE, UNSPECIFIED TYPE: ICD-10-CM

## 2021-11-22 DIAGNOSIS — J96.01 ACUTE RESPIRATORY FAILURE WITH HYPOXIA (HCC): ICD-10-CM

## 2021-11-22 DIAGNOSIS — Z79.899 LONG TERM USE OF DRUG: Primary | ICD-10-CM

## 2021-11-22 PROCEDURE — 99214 OFFICE O/P EST MOD 30 MIN: CPT | Performed by: NURSE PRACTITIONER

## 2021-11-22 RX ORDER — BUDESONIDE AND FORMOTEROL FUMARATE DIHYDRATE 160; 4.5 UG/1; UG/1
2 AEROSOL RESPIRATORY (INHALATION)
Qty: 10.2 G | Refills: 12 | Status: SHIPPED | OUTPATIENT
Start: 2021-11-22

## 2021-11-22 RX ORDER — FLUTICASONE PROPIONATE 50 MCG
2 SPRAY, SUSPENSION (ML) NASAL DAILY
Qty: 9.9 ML | Refills: 10 | Status: SHIPPED | OUTPATIENT
Start: 2021-11-22

## 2021-11-22 NOTE — PROGRESS NOTES
Subjective     Chief Complaint   Patient presents with   • Fatigue   • Congestive Heart Failure       History of Present Illness  Patient comes in today to follow-up on recent hospitalization back the first part of this month for hypoxic respiratory failure as well as acute congestive heart failure.  She was placed on IV diuretics and was diuresed well.  She was sent home on oral Lasix and has only use this as needed weight gain of 3 pounds.  She comes in today to follow-up.  She does state that she remains quite fatigued.  Her energy level is still pretty low.  She does admit that her breathing is much better she can lie flat, however, she is sleeping elevated in her hospital bed.  He does have her oxygen with her in the office but does not have it on her oxygen level today upon entering the clinic was 96%.  He is denying any chest pain at present.    Patient's PMR from outside medical facility reviewed and noted.    Review of Systems   Otherwise complete ROS reviewed and negative except as mentioned in the HPI.    Past Medical History:   Past Medical History:   Diagnosis Date   • Anxiety    • Arthritis    • Depression    • Diabetes mellitus (HCC)    • GERD (gastroesophageal reflux disease)    • Heart problem    • Hyperlipidemia    • Memory problem    • Sleep apnea    • Visual impairment      Past Surgical History:  Past Surgical History:   Procedure Laterality Date   • CHOLECYSTECTOMY     • CORONARY ARTERY BYPASS GRAFT       Social History:  reports that she has been smoking cigarettes. She has a 36.00 pack-year smoking history. She has never used smokeless tobacco. She reports previous alcohol use. She reports that she does not use drugs.    Family History: family history includes Cancer in her mother; Diabetes in her maternal grandmother; Hyperlipidemia in her sister; Hypertension in her sister.       Allergies:  Allergies   Allergen Reactions   • Fluoxetine Hcl Shortness Of Breath   • Levofloxacin  Anaphylaxis   • Norfloxacin Shortness Of Breath     Medications:  Prior to Admission medications    Medication Sig Start Date End Date Taking? Authorizing Provider   atorvastatin (Lipitor) 10 MG tablet Take 1 tablet by mouth Daily. 7/26/21   Alejandra Chaudhari DO   carvedilol (COREG) 6.25 MG tablet Take 1 tablet by mouth 2 (Two) Times a Day With Meals. 11/5/21   Nancy Rivera APRN   cloNIDine (Catapres) 0.1 MG tablet Take 1 tablet by mouth 2 (Two) Times a Day As Needed for High Blood Pressure (SBP>170). 11/5/21   Nancy Rivera APRN   Fluticasone-Umeclidin-Vilant (Trelegy Ellipta) 200-62.5-25 MCG/INH aerosol powder  Inhale 1 puff Daily. 3/1/21   Alejandra Chaudhari DO   furosemide (LASIX) 40 MG tablet Take 1 tablet by mouth daily for 3 days, then may use daily as needed for weight gain, shortness of breath, or swelling. 11/5/21   Nancy Rivera APRN   glipizide (GLUCOTROL) 5 MG tablet Take 1 tablet by mouth 2 (two) times a day. 3/1/21   Alejandra Chaudhari DO   ipratropium-albuterol (DUO-NEB) 0.5-2.5 mg/3 ml nebulizer Take 3 mL by nebulization Every 4 (Four) Hours As Needed for Wheezing. 10/13/20   Alejandra Chaudhari DO   lamoTRIgine (LaMICtal) 25 MG tablet Take 1 tablet by mouth 2 (Two) Times a Day. 1 pill daily for a week then 2 pills daily 7/20/21   Alejandra Chaudhari DO   lisinopril (PRINIVIL,ZESTRIL) 10 MG tablet Take 10 mg by mouth Daily.    Provider, MD Justin   loratadine (CLARITIN) 10 MG tablet Take 1 tablet by mouth Daily As Needed for Allergies. 10/21/21   Alejandra Chaudhari DO   nitroglycerin (NITROSTAT) 0.4 MG SL tablet Place 1 tablet under the tongue Daily As Needed for Chest Pain. 7/26/21   Alejandra Chaudhari DO   Omega-3 Fatty Acids (fish oil) 1000 MG capsule capsule Take 1,000 mg by mouth 2 (Two) Times a Day With Meals. 6/18/20   Provider, MD Justin   omeprazole (priLOSEC) 40 MG capsule Take 1 capsule by mouth Daily. 8/19/21   Alejandra Chaudhari DO   potassium  "chloride 10 MEQ CR tablet Take 1 tablet by mouth Daily. 10/21/21   Alejandra Chaudhari DO   venlafaxine XR (Effexor XR) 150 MG 24 hr capsule Take 2 capsules by mouth Daily. 10/21/21   Alejandra Chaudhari DO   Ventolin  (90 Base) MCG/ACT inhaler Inhale 1 puff Every 4 (Four) Hours As Needed for Wheezing. 10/21/21   Alejandra Chaudhari DO   Xultophy 100-3.6 UNIT-MG/ML solution pen-injector subcutaneous pen Inject 30 Units as directed Every Night. 8/28/20   Provider, MD Justin       Objective     Vital Signs: /69   Pulse 77   Temp 97.5 °F (36.4 °C) (Skin)   Resp 16   Ht 165.1 cm (65\")   Wt 96.2 kg (212 lb)   SpO2 96%   BMI 35.28 kg/m²   Physical Exam  Vitals reviewed.   Constitutional:       Appearance: She is well-developed. She is ill-appearing ( chronically ill appearing. ).   HENT:      Head: Normocephalic and atraumatic.   Eyes:      Pupils: Pupils are equal, round, and reactive to light.   Neck:      Vascular: No JVD.   Cardiovascular:      Rate and Rhythm: Normal rate and regular rhythm.   Pulmonary:      Effort: Pulmonary effort is normal.      Breath sounds: Wheezing ( bilaterally. ) present.   Abdominal:      General: Bowel sounds are normal.      Palpations: Abdomen is soft.   Musculoskeletal:         General: No deformity.      Cervical back: Normal range of motion and neck supple.   Lymphadenopathy:      Cervical: No cervical adenopathy.   Skin:     General: Skin is warm and dry.   Neurological:      Mental Status: She is alert and oriented to person, place, and time.   Psychiatric:         Behavior: Behavior normal.         Thought Content: Thought content normal.         Judgment: Judgment normal.       Results Reviewed:  Glucose   Date Value Ref Range Status   11/05/2021 187 (H) 65 - 99 mg/dL Final   06/16/2020 352 (H) 74 - 109 mg/dL Final     BUN   Date Value Ref Range Status   11/05/2021 13 6 - 20 mg/dL Final   06/16/2020 9 6 - 20 mg/dL Final     Creatinine   Date Value Ref " Range Status   11/05/2021 0.94 0.57 - 1.00 mg/dL Final   06/16/2020 0.7 0.5 - 0.9 mg/dL Final     Sodium   Date Value Ref Range Status   11/05/2021 135 (L) 136 - 145 mmol/L Final   06/16/2020 134 (L) 136 - 145 mmol/L Final     Potassium   Date Value Ref Range Status   11/05/2021 3.4 (L) 3.5 - 5.2 mmol/L Final     Comment:     Slight hemolysis detected by analyzer. Results may be affected.   06/16/2020 3.4 (L) 3.5 - 5.0 mmol/L Final     Chloride   Date Value Ref Range Status   11/05/2021 92 (L) 98 - 107 mmol/L Final   06/16/2020 92 (L) 98 - 111 mmol/L Final     CO2   Date Value Ref Range Status   11/05/2021 37.0 (H) 22.0 - 29.0 mmol/L Final   06/16/2020 29 22 - 29 mmol/L Final     Calcium   Date Value Ref Range Status   11/05/2021 8.6 8.6 - 10.5 mg/dL Final   06/16/2020 8.5 (L) 8.6 - 10.0 mg/dL Final     ALT (SGPT)   Date Value Ref Range Status   11/02/2021 8 1 - 33 U/L Final   06/16/2020 8 5 - 33 U/L Final     AST (SGOT)   Date Value Ref Range Status   11/02/2021 11 1 - 32 U/L Final   06/16/2020 10 5 - 32 U/L Final     WBC   Date Value Ref Range Status   11/05/2021 12.54 (H) 3.40 - 10.80 10*3/mm3 Final   07/19/2021 10.2 3.4 - 10.8 x10E3/uL Final   06/16/2020 10.5 4.8 - 10.8 K/uL Final     Hematocrit   Date Value Ref Range Status   11/05/2021 36.1 34.0 - 46.6 % Final   06/16/2020 39.7 37.0 - 47.0 % Final     Platelets   Date Value Ref Range Status   11/05/2021 263 140 - 450 10*3/mm3 Final   06/16/2020 281 130 - 400 K/uL Final     Triglycerides   Date Value Ref Range Status   07/19/2021 167 (H) 0 - 149 mg/dL Final   06/16/2020 169 (H) 0 - 149 mg/dL Final     HDL Cholesterol   Date Value Ref Range Status   07/19/2021 53 >39 mg/dL Final   06/16/2020 51 (L) 65 - 121 mg/dL Final     Comment:     VALUES>60 MG/DL ARE ASSOCIATED WITH A DECREASED RISK OF  ATHEROSCLEROTIC CARDIOVASCULAR DISEASE     LDL Cholesterol    Date Value Ref Range Status   06/16/2020 136 <100 mg/dL Final     Comment:     <100 MG/DL=OPITIMAL    100-129  MG/DL=DESIRABLE    130-159 MG/DL BORDERLINE=INCREASED RISK OF ATHEROSCLEROTIC  CARDIOVASCULAR DISEASE    > OR = 160 MG/DL=ASSOCIATED WITH AN INCREASE RISK OF  ATHEROSCLEROTIC CARDIOVASCULAR DISEASE     LDL Chol Calc (NIH)   Date Value Ref Range Status   07/19/2021 189 (H) 0 - 99 mg/dL Final     Hemoglobin A1C   Date Value Ref Range Status   11/03/2021 8.30 (H) 4.80 - 5.60 % Final   06/16/2020 8.7 (H) 4.0 - 6.0 % Final     Comment:     HbA1c levels >6% are an indication of hyperglycemia during the preceding 2  to 3 months or longer.    HbA1c levels may reach 20% or higher in poorly controlled diabetes.  Therapeutic action is suggested at levels above 8%.    Diabetes patients with HbA1c levels below 7% meet the goal of the American  Diabetes Association.    HbA1c levels below the established reference range may indicate recent  episodes of hypoglycemia, the presence of Hb variants, or shortened lifetime  of erythrocytes.          Assessment / Plan     Assessment/Plan:  Diagnoses and all orders for this visit:    1. Long term use of drug (Primary)  -     CBC w AUTO Differential  -     Basic Metabolic Panel  -     ToxASSURE Select 13 (MW) - Urine, Clean Catch    2. Fatigue, unspecified type  -     Vitamin B12    3. Acute respiratory failure with hypoxia (HCC)  -     budesonide-formoterol (Symbicort) 160-4.5 MCG/ACT inhaler; Inhale 2 puffs 2 (Two) Times a Day.  Dispense: 10.2 g; Refill: 12  -     fluticasone (Flonase) 50 MCG/ACT nasal spray; 2 sprays into the nostril(s) as directed by provider Daily.  Dispense: 9.9 mL; Refill: 10      Return in about 1 month (around 12/22/2021) for adeola Chaudhari. . unless patient needs to be seen sooner or acute issues arise.    I have discussed the patient results/orders and and plan/recommendation with them at today's visit.      Bekah Lara, APRN   11/22/2021

## 2021-11-24 ENCOUNTER — TELEPHONE (OUTPATIENT)
Dept: INTERNAL MEDICINE | Facility: CLINIC | Age: 61
End: 2021-11-24

## 2021-11-24 LAB
BASOPHILS # BLD AUTO: 0.1 X10E3/UL (ref 0–0.2)
BASOPHILS NFR BLD AUTO: 1 %
BUN SERPL-MCNC: 8 MG/DL (ref 6–24)
BUN/CREAT SERPL: 9 (ref 9–23)
CALCIUM SERPL-MCNC: 9.6 MG/DL (ref 8.7–10.2)
CHLORIDE SERPL-SCNC: 90 MMOL/L (ref 96–106)
CO2 SERPL-SCNC: 31 MMOL/L (ref 20–29)
CREAT SERPL-MCNC: 0.93 MG/DL (ref 0.57–1)
EOSINOPHIL # BLD AUTO: 0.7 X10E3/UL (ref 0–0.4)
EOSINOPHIL NFR BLD AUTO: 8 %
ERYTHROCYTE [DISTWIDTH] IN BLOOD BY AUTOMATED COUNT: 15.4 % (ref 11.7–15.4)
GLUCOSE SERPL-MCNC: 263 MG/DL (ref 65–99)
HCT VFR BLD AUTO: 42.4 % (ref 34–46.6)
HGB BLD-MCNC: 12.8 G/DL (ref 11.1–15.9)
IMM GRANULOCYTES # BLD AUTO: 0 X10E3/UL (ref 0–0.1)
IMM GRANULOCYTES NFR BLD AUTO: 0 %
LYMPHOCYTES # BLD AUTO: 1.6 X10E3/UL (ref 0.7–3.1)
LYMPHOCYTES NFR BLD AUTO: 18 %
MCH RBC QN AUTO: 23.9 PG (ref 26.6–33)
MCHC RBC AUTO-ENTMCNC: 30.2 G/DL (ref 31.5–35.7)
MCV RBC AUTO: 79 FL (ref 79–97)
MONOCYTES # BLD AUTO: 0.4 X10E3/UL (ref 0.1–0.9)
MONOCYTES NFR BLD AUTO: 4 %
NEUTROPHILS # BLD AUTO: 6.1 X10E3/UL (ref 1.4–7)
NEUTROPHILS NFR BLD AUTO: 69 %
PLATELET # BLD AUTO: 313 X10E3/UL (ref 150–450)
POTASSIUM SERPL-SCNC: 4.5 MMOL/L (ref 3.5–5.2)
RBC # BLD AUTO: 5.35 X10E6/UL (ref 3.77–5.28)
SODIUM SERPL-SCNC: 135 MMOL/L (ref 134–144)
VIT B12 SERPL-MCNC: 473 PG/ML (ref 232–1245)
WBC # BLD AUTO: 9 X10E3/UL (ref 3.4–10.8)

## 2021-11-24 NOTE — TELEPHONE ENCOUNTER
I called pt with the results of her labs and she stated that she was told that if she came in and did a UDS and it was clear that she would get controlled medications again. I let pt know that results were not back yet. I see where UDS was positive in past and that you could no longer write controlled. Told her you were out until Monday and could discuss further then.

## 2021-11-29 LAB — DRUGS UR: NORMAL

## 2021-11-29 NOTE — TELEPHONE ENCOUNTER
I spoke with pt,  She states Dr Chaudhari told her on 11/2/21 that she would have pt do a UDS and if it came back normal she would give her a 2nd chance.  Pt called to check to see if Dr Chaudhari was going to give her pain meds back?  She is hurting so bad.   I told her that Dr Chaudhari is gone for the day but would let her know tomorrow.   (her UDS was post. In past).

## 2021-11-30 DIAGNOSIS — M54.42 CHRONIC LOW BACK PAIN WITH LEFT-SIDED SCIATICA, UNSPECIFIED BACK PAIN LATERALITY: Primary | ICD-10-CM

## 2021-11-30 DIAGNOSIS — G89.29 CHRONIC LOW BACK PAIN WITH LEFT-SIDED SCIATICA, UNSPECIFIED BACK PAIN LATERALITY: Primary | ICD-10-CM

## 2021-11-30 RX ORDER — GABAPENTIN 300 MG/1
300 CAPSULE ORAL 3 TIMES DAILY
Qty: 42 CAPSULE | Refills: 0 | Status: SHIPPED | OUTPATIENT
Start: 2021-11-30 | End: 2021-12-22

## 2021-11-30 RX ORDER — HYDROCODONE BITARTRATE AND ACETAMINOPHEN 10; 325 MG/1; MG/1
1 TABLET ORAL EVERY 6 HOURS PRN
Qty: 28 TABLET | Refills: 0 | Status: SHIPPED | OUTPATIENT
Start: 2021-11-30 | End: 2021-12-20

## 2021-12-07 ENCOUNTER — CLINICAL SUPPORT (OUTPATIENT)
Dept: INTERNAL MEDICINE | Facility: CLINIC | Age: 61
End: 2021-12-07

## 2021-12-07 DIAGNOSIS — Z79.899 LONG TERM USE OF DRUG: Primary | ICD-10-CM

## 2021-12-07 PROCEDURE — 99211 OFF/OP EST MAY X REQ PHY/QHP: CPT | Performed by: INTERNAL MEDICINE

## 2021-12-07 NOTE — PROGRESS NOTES
Pt came into the office today for a UDS,  She will return in 2 weeks per Dr Chaudhari.   Answers for HPI/ROS submitted by the patient on 12/6/2021  Please describe your symptoms.: urine test  Have you had these symptoms before?: Yes  How long have you been having these symptoms?: Greater than 2 weeks  Please list any medications you are currently taking for this condition.: none  What is the primary reason for your visit?: Other

## 2021-12-09 DIAGNOSIS — F33.1 MODERATE EPISODE OF RECURRENT MAJOR DEPRESSIVE DISORDER (HCC): ICD-10-CM

## 2021-12-09 DIAGNOSIS — Z79.4 TYPE 2 DIABETES MELLITUS WITH DIABETIC NEUROPATHY, WITH LONG-TERM CURRENT USE OF INSULIN (HCC): ICD-10-CM

## 2021-12-09 DIAGNOSIS — E11.40 TYPE 2 DIABETES MELLITUS WITH DIABETIC NEUROPATHY, WITH LONG-TERM CURRENT USE OF INSULIN (HCC): ICD-10-CM

## 2021-12-09 DIAGNOSIS — E87.6 HYPOKALEMIA: ICD-10-CM

## 2021-12-09 NOTE — TELEPHONE ENCOUNTER
Rx Refill Note  Requested Prescriptions     Pending Prescriptions Disp Refills   • venlafaxine XR (EFFEXOR-XR) 150 MG 24 hr capsule [Pharmacy Med Name: VENLAFAXINE HCL  MG C 150 Capsule] 60 capsule 1     Sig: TAKE TWO CAPSULES BY MOUTH DAILY.   • potassium chloride 10 MEQ CR tablet [Pharmacy Med Name: POTASSIUM CHLORIDE ER 10 ME 10 Tablet] 30 tablet 1     Sig: TAKE 1 TABLET BY MOUTH DAILY.   • glipizide (GLUCOTROL) 5 MG tablet [Pharmacy Med Name: GLIPIZIDE 5 MG TAB 5 Tablet] 180 tablet 3     Sig: TAKE ONE TABLET BY MOUTH TWICE A DAY   • cloNIDine (CATAPRES) 0.1 MG tablet [Pharmacy Med Name: CLONIDINE HCL 0.1 MG TAB 0.1 Tablet] 60 tablet 1     Sig: TAKE 1 TABLET BY MOUTH 2 TIMES A DAY.      Last office visit with prescribing clinician: 7/20/2021      Next office visit with prescribing clinician: 12/20/2021     Refills on Venlafaxine, Potassium, & Clonidine not appropriate.        Anne-Marie Jj MA  12/09/21, 15:27 CST

## 2021-12-13 LAB — DRUGS UR: NORMAL

## 2021-12-13 RX ORDER — POTASSIUM CHLORIDE 750 MG/1
10 TABLET, FILM COATED, EXTENDED RELEASE ORAL DAILY
Qty: 30 TABLET | Refills: 1 | Status: SHIPPED | OUTPATIENT
Start: 2021-12-13 | End: 2022-02-21

## 2021-12-13 RX ORDER — VENLAFAXINE HYDROCHLORIDE 150 MG/1
CAPSULE, EXTENDED RELEASE ORAL
Qty: 60 CAPSULE | Refills: 1 | Status: SHIPPED | OUTPATIENT
Start: 2021-12-13 | End: 2022-03-03

## 2021-12-13 RX ORDER — GLIPIZIDE 5 MG/1
TABLET ORAL
Qty: 180 TABLET | Refills: 3 | Status: SHIPPED | OUTPATIENT
Start: 2021-12-13

## 2021-12-13 RX ORDER — CLONIDINE HYDROCHLORIDE 0.1 MG/1
TABLET ORAL
Qty: 60 TABLET | Refills: 1 | Status: SHIPPED | OUTPATIENT
Start: 2021-12-13 | End: 2022-02-21

## 2021-12-20 ENCOUNTER — OFFICE VISIT (OUTPATIENT)
Dept: INTERNAL MEDICINE | Facility: CLINIC | Age: 61
End: 2021-12-20

## 2021-12-20 VITALS
DIASTOLIC BLOOD PRESSURE: 89 MMHG | TEMPERATURE: 97.5 F | WEIGHT: 208 LBS | HEART RATE: 94 BPM | BODY MASS INDEX: 34.66 KG/M2 | SYSTOLIC BLOOD PRESSURE: 159 MMHG | OXYGEN SATURATION: 98 % | HEIGHT: 65 IN

## 2021-12-20 VITALS
HEIGHT: 65 IN | DIASTOLIC BLOOD PRESSURE: 89 MMHG | BODY MASS INDEX: 34.66 KG/M2 | HEART RATE: 94 BPM | SYSTOLIC BLOOD PRESSURE: 159 MMHG | WEIGHT: 208 LBS

## 2021-12-20 DIAGNOSIS — G89.29 CHRONIC LOW BACK PAIN WITH LEFT-SIDED SCIATICA, UNSPECIFIED BACK PAIN LATERALITY: ICD-10-CM

## 2021-12-20 DIAGNOSIS — M54.42 CHRONIC LOW BACK PAIN WITH LEFT-SIDED SCIATICA, UNSPECIFIED BACK PAIN LATERALITY: ICD-10-CM

## 2021-12-20 DIAGNOSIS — M81.6 LOCALIZED OSTEOPOROSIS (LEQUESNE): ICD-10-CM

## 2021-12-20 DIAGNOSIS — M54.42 CHRONIC LEFT-SIDED LOW BACK PAIN WITH LEFT-SIDED SCIATICA: ICD-10-CM

## 2021-12-20 DIAGNOSIS — E11.40 TYPE 2 DIABETES MELLITUS WITH DIABETIC NEUROPATHY, WITH LONG-TERM CURRENT USE OF INSULIN (HCC): Primary | ICD-10-CM

## 2021-12-20 DIAGNOSIS — R13.10 DYSPHAGIA, UNSPECIFIED TYPE: ICD-10-CM

## 2021-12-20 DIAGNOSIS — Z79.899 LONG TERM USE OF DRUG: ICD-10-CM

## 2021-12-20 DIAGNOSIS — Z13.820 OSTEOPOROSIS SCREENING: Primary | ICD-10-CM

## 2021-12-20 DIAGNOSIS — Z12.31 ENCOUNTER FOR SCREENING MAMMOGRAM FOR MALIGNANT NEOPLASM OF BREAST: ICD-10-CM

## 2021-12-20 DIAGNOSIS — Z79.4 TYPE 2 DIABETES MELLITUS WITH DIABETIC NEUROPATHY, WITH LONG-TERM CURRENT USE OF INSULIN (HCC): Primary | ICD-10-CM

## 2021-12-20 DIAGNOSIS — G89.29 CHRONIC LEFT-SIDED LOW BACK PAIN WITH LEFT-SIDED SCIATICA: ICD-10-CM

## 2021-12-20 PROCEDURE — 1159F MED LIST DOCD IN RCRD: CPT | Performed by: INTERNAL MEDICINE

## 2021-12-20 PROCEDURE — 1170F FXNL STATUS ASSESSED: CPT | Performed by: INTERNAL MEDICINE

## 2021-12-20 PROCEDURE — G0439 PPPS, SUBSEQ VISIT: HCPCS | Performed by: INTERNAL MEDICINE

## 2021-12-20 PROCEDURE — 99214 OFFICE O/P EST MOD 30 MIN: CPT | Performed by: INTERNAL MEDICINE

## 2021-12-20 RX ORDER — CALCIUM CARBONATE 160(400)MG
1 TABLET,CHEWABLE ORAL CONTINUOUS
Qty: 1 EACH | Refills: 0 | Status: SHIPPED | OUTPATIENT
Start: 2021-12-20

## 2021-12-20 RX ORDER — CALCIUM CARBONATE 160(400)MG
1 TABLET,CHEWABLE ORAL CONTINUOUS
Qty: 1 EACH | Refills: 0 | Status: SHIPPED | OUTPATIENT
Start: 2021-12-20 | End: 2021-12-20

## 2021-12-20 RX ORDER — PROCHLORPERAZINE 25 MG/1
SUPPOSITORY RECTAL
Qty: 3 EACH | Refills: 5 | Status: SHIPPED | OUTPATIENT
Start: 2021-12-20 | End: 2022-03-14 | Stop reason: SDUPTHER

## 2021-12-20 RX ORDER — OXYCODONE AND ACETAMINOPHEN 10; 325 MG/1; MG/1
1 TABLET ORAL EVERY 6 HOURS PRN
Qty: 60 TABLET | Refills: 0 | Status: SHIPPED | OUTPATIENT
Start: 2021-12-20 | End: 2022-01-18 | Stop reason: SDUPTHER

## 2021-12-20 RX ORDER — ORAL SEMAGLUTIDE 7 MG/1
7 TABLET ORAL DAILY
Qty: 30 TABLET | Refills: 1 | Status: SHIPPED | OUTPATIENT
Start: 2021-12-20

## 2021-12-20 RX ORDER — BLOOD-GLUCOSE,RECEIVER,CONT
1 EACH MISCELLANEOUS DAILY
Qty: 1 EACH | Refills: 0 | Status: SHIPPED | OUTPATIENT
Start: 2021-12-20 | End: 2022-03-14 | Stop reason: SDUPTHER

## 2021-12-20 NOTE — PROGRESS NOTES
The ABCs of the Annual Wellness Visit  Subsequent Medicare Wellness Visit    Chief Complaint   Patient presents with   • Medicare Wellness-subsequent      Subjective    History of Present Illness:  Nora Carney is a 61 y.o. female who presents for a Subsequent Medicare Wellness Visit.    The following portions of the patient's history were reviewed and   updated as appropriate:   Past Medical History:   Diagnosis Date   • Anxiety     • Arthritis     • Depression     • Diabetes mellitus (HCC)     • GERD (gastroesophageal reflux disease)     • Heart problem     • Hyperlipidemia     • Memory problem     • Sleep apnea     • Visual impairment           Past Surgical History:  Surgical History         Past Surgical History:   Procedure Laterality Date   • CHOLECYSTECTOMY       • CORONARY ARTERY BYPASS GRAFT             Social History:  reports that she has been smoking cigarettes. She has a 36.00 pack-year smoking history. She has never used smokeless tobacco. She reports previous alcohol use. She reports that she does not use drugs.     Family History: family history includes Cancer in her mother; Diabetes in her maternal grandmother; Hyperlipidemia in her sister; Hypertension in her sister.        Allergies:       Allergies   Allergen Reactions   • Fluoxetine Hcl Shortness Of Breath   • Levofloxacin Anaphylaxis   • Norfloxacin Shortness Of Breath     Compared to one year ago, the patient feels her physical   health is the same.    Compared to one year ago, the patient feels her mental   health is better.    Recent Hospitalizations:  This patient has had a [facility] admission record on file within the last 365 days.    Current Medical Providers:  Patient Care Team:  Alejandra Chaudhari DO as PCP - General (Hospitalist)  Cristofer Pardo MD as PCP - Family Medicine    Outpatient Medications Prior to Visit   Medication Sig Dispense Refill   • budesonide-formoterol (Symbicort) 160-4.5 MCG/ACT inhaler Inhale 2  puffs 2 (Two) Times a Day. 10.2 g 12   • cloNIDine (CATAPRES) 0.1 MG tablet TAKE 1 TABLET BY MOUTH 2 TIMES A DAY. 60 tablet 1   • fluticasone (Flonase) 50 MCG/ACT nasal spray 2 sprays into the nostril(s) as directed by provider Daily. 9.9 mL 10   • Fluticasone-Umeclidin-Vilant (Trelegy Ellipta) 200-62.5-25 MCG/INH aerosol powder  Inhale 1 puff Daily. 60 each 11   • furosemide (LASIX) 40 MG tablet Take 1 tablet by mouth daily for 3 days, then may use daily as needed for weight gain, shortness of breath, or swelling. 30 tablet 1   • gabapentin (Neurontin) 300 MG capsule Take 1 capsule by mouth 3 (Three) Times a Day. 42 capsule 0   • glipizide (GLUCOTROL) 5 MG tablet TAKE ONE TABLET BY MOUTH TWICE A  tablet 3   • HYDROcodone-acetaminophen (Norco)  MG per tablet Take 1 tablet by mouth Every 6 (Six) Hours As Needed for Moderate Pain . 28 tablet 0   • ipratropium-albuterol (DUO-NEB) 0.5-2.5 mg/3 ml nebulizer Take 3 mL by nebulization Every 4 (Four) Hours As Needed for Wheezing. 360 mL 12   • lamoTRIgine (LaMICtal) 25 MG tablet Take 1 tablet by mouth 2 (Two) Times a Day. 1 pill daily for a week then 2 pills daily 60 tablet 1   • lisinopril (PRINIVIL,ZESTRIL) 10 MG tablet Take 10 mg by mouth Daily.     • nitroglycerin (NITROSTAT) 0.4 MG SL tablet Place 1 tablet under the tongue Daily As Needed for Chest Pain. 30 tablet 5   • Omega-3 Fatty Acids (fish oil) 1000 MG capsule capsule Take 1,000 mg by mouth 2 (Two) Times a Day With Meals.     • omeprazole (priLOSEC) 40 MG capsule Take 1 capsule by mouth Daily. 90 capsule 1   • potassium chloride 10 MEQ CR tablet TAKE 1 TABLET BY MOUTH DAILY. 30 tablet 1   • venlafaxine XR (EFFEXOR-XR) 150 MG 24 hr capsule TAKE TWO CAPSULES BY MOUTH DAILY. 60 capsule 1   • Ventolin  (90 Base) MCG/ACT inhaler Inhale 1 puff Every 4 (Four) Hours As Needed for Wheezing. 18 g 5   • Xultophy 100-3.6 UNIT-MG/ML solution pen-injector subcutaneous pen Inject 30 Units as directed Every  Night.     • carvedilol (COREG) 6.25 MG tablet Take 1 tablet by mouth 2 (Two) Times a Day With Meals. 60 tablet 3   • atorvastatin (Lipitor) 10 MG tablet Take 1 tablet by mouth Daily. 30 tablet 5   • loratadine (CLARITIN) 10 MG tablet Take 1 tablet by mouth Daily As Needed for Allergies. 90 tablet 1     No facility-administered medications prior to visit.       Opioid medication/s are on active medication list.  and I have evaluated her active treatment plan and pain score trends (see table).  There were no vitals filed for this visit.  I have reviewed the chart for potential of high risk medication and harmful drug interactions in the elderly.            Aspirin is not on active medication list.  Aspirin use is not indicated based on review of current medical condition/s. Risk of harm outweighs potential benefits.  .    Patient Active Problem List   Diagnosis   • Acute respiratory failure with hypoxia (HCC)   • History of AVNRT (AV karl re-entry tachycardia) ([facility]) s/p ablation    • Tobacco abuse   • Obesity (BMI 30-39.9)   • COPD with acute exacerbation ([facility])   • Acute pulmonary edema ([facility])   • Acute on chronic diastolic CHF (congestive heart failure) ([facility])     Advance Care Planning  Advance Directive is not on file.  ACP discussion was declined by the patient. Patient does not have an advance directive, declines further assistance.    Review of Systems   Constitutional: Negative for chills and fever.   HENT: Negative for congestion and rhinorrhea.    Respiratory: Negative for cough.    Cardiovascular: Negative for chest pain and leg swelling.   Gastrointestinal: Negative for constipation and diarrhea.   Genitourinary: Negative for dysuria and hematuria.   Musculoskeletal: Positive for arthralgias, back pain and gait problem.   Psychiatric/Behavioral: Negative for dysphoric mood. The patient is not nervous/anxious.         Objective    Vitals:    12/20/21 1037 12/20/21 1044   BP: (!) 168/9  Comment: has not taken  "morning pill 159/89   BP Location: Left arm Left arm   Patient Position: Sitting Sitting   Cuff Size: Adult Adult   Pulse: 101 94   Temp: 97.5 °F (36.4 °C)    TempSrc: Infrared    SpO2: 98%    Weight: 94.3 kg (208 lb)    Height: 165.1 cm (65\")      BMI Readings from Last 1 Encounters:   12/20/21 34.61 kg/m²   BMI is above normal parameters. Recommendations include: nutrition counseling    Does the patient have evidence of cognitive impairment? No    Physical Exam  Vitals reviewed.   Constitutional:       Appearance: Normal appearance.   HENT:      Head: Normocephalic and atraumatic.      Nose: Nose normal.   Eyes:      General: No scleral icterus.     Conjunctiva/sclera: Conjunctivae normal.   Cardiovascular:      Rate and Rhythm: Normal rate and regular rhythm.      Heart sounds: Normal heart sounds.   Pulmonary:      Effort: Pulmonary effort is normal.      Breath sounds: Normal breath sounds.   Musculoskeletal:         General: No swelling or tenderness.      Cervical back: Normal range of motion and neck supple.      Right lower leg: No edema.      Left lower leg: No edema.   Skin:     General: Skin is warm and dry.   Neurological:      Mental Status: She is alert and oriented to person, place, and time.      Cranial Nerves: No cranial nerve deficit.   Psychiatric:         Mood and Affect: Mood normal.         Behavior: Behavior normal.       Lab Results   Component Value Date    HGBA1C 8.30 (H) 11/03/2021            HEALTH RISK ASSESSMENT    Smoking Status:  Social History     Tobacco Use   Smoking Status Light Tobacco Smoker   • Packs/day: 0.50   • Years: 45.00   • Pack years: 22.50   • Types: Cigarettes   Smokeless Tobacco Never Used     Alcohol Consumption:  Social History     Substance and Sexual Activity   Alcohol Use Not Currently     Fall Risk Screen:    [facility] not [facility].    Depression Screening:  PHQ-2/PHQ-9 Depression Screening 12/20/2021   Little interest or pleasure " in doing things 0   Feeling down, depressed, or hopeless 0   Trouble falling or staying asleep, or sleeping too much -   Feeling tired or having little energy -   Poor appetite or overeating -   Feeling bad about yourself - or that you are a failure or have let yourself or your family down -   Trouble concentrating on things, such as reading the newspaper or watching television -   Moving or speaking so slowly that other people could have noticed. Or the opposite - being so fidgety or restless that you have been moving around a lot more than usual -   Thoughts that you would be better off dead, or of hurting yourself in some way -   Total Score 0   If you checked off any problems, how difficult have these problems made it for you to do your work, take care of things at home, or get along with other people? -       Health Habits and Functional and Cognitive Screening:  Functional & Cognitive Status 12/20/2021   Do you have difficulty preparing food and eating? No   Do you have difficulty bathing yourself, getting dressed or grooming yourself? No   Do you have difficulty using the toilet? No   Do you have difficulty moving around from place to place? Yes   Do you have trouble with steps or getting out of a bed or a chair? No   Current Diet Other   Dental Exam Other        Dental Exam Comment has dentures   Eye Exam Not up to date   Exercise (times per week) 3 times per week   Current Exercises Include Weightlifting   Do you need help using the phone?  No   Are you deaf or do you have serious difficulty hearing?  No   Do you need help with transportation? Yes   Do you need help shopping? Yes   Do you need help preparing meals?  Yes   Do you need help with housework?  Yes   Do you need help with laundry? Yes   Do you need help taking your medications? Yes   Do you need help managing money? No   Do you ever drive or ride in a car without wearing a seat belt? No       Age-appropriate Screening Schedule:  Refer to the list  below for future screening recommendations based on patient's age, sex and/or medical conditions. Orders for these recommended tests are listed in the plan section. The patient has been provided with a written plan.    Health Maintenance   Topic Date Due   • DXA SCAN  Never done   • URINE MICROALBUMIN  06/16/2021   • MAMMOGRAM  12/20/2021 (Originally 1960)   • DIABETIC FOOT EXAM  01/04/2022 (Originally 10/13/2020)   • DIABETIC EYE EXAM  02/07/2022 (Originally 10/13/2020)   • PAP SMEAR  02/11/2022 (Originally 10/13/2020)   • INFLUENZA VACCINE  03/31/2022 (Originally 8/1/2021)   • ZOSTER VACCINE (1 of 2) 12/20/2022 (Originally 7/21/2012)   • HEMOGLOBIN A1C  05/03/2022   • LIPID PANEL  07/19/2022   • TDAP/TD VACCINES (2 - Td or Tdap) 06/08/2028              Assessment/Plan   CMS Preventative Services Quick Reference  Risk Factors Identified During Encounter  Chronic Pain /Discussed osteoporosis screening.     The above risks/problems have been discussed with the patient.  Follow up actions/plans if indicated are seen below in the Assessment/Plan Section.  Pertinent information has been shared with the patient in the After Visit Summary.    Impression:  1. Breast cancer screening  - Mammogram    2. Osteoporosis  - Dexa scan      Follow Up:   Return in about 4 weeks (around 1/17/2022) for Recheck, Next scheduled follow up.     An After Visit Summary and PPPS were made available to the patient.

## 2021-12-20 NOTE — PROGRESS NOTES
Subjective     Chief Complaint   Patient presents with   • long term drug use       History of Present Illness  Patient states that her only issue is trying to get her pain under control. Not sleeping well.   Has had clean UDS.     Difficulty swallowing. Worsening. Mostly food but has occasionally gotten choked with liquids.     Has had two lows with her glucose. Doesn't want to take insulin any more. Has been taking her glucose.     Patient asks for Rollator and diabetic shoes.     Patient's PMR from outside medical facility reviewed and noted.    Review of Systems   Constitutional: Negative for chills and fever.   HENT: Negative for congestion and rhinorrhea.    Respiratory: Negative for cough and shortness of breath.    Cardiovascular: Negative for chest pain and leg swelling.   Gastrointestinal: Negative for constipation and diarrhea.        Choking on foods and liquids.    Genitourinary: Negative for dysuria and hematuria.   Musculoskeletal: Positive for arthralgias and back pain.      Otherwise complete ROS reviewed and negative except as mentioned in the HPI.    Past Medical History:   Past Medical History:   Diagnosis Date   • Anxiety    • Arthritis    • Asthma 1995   • Cancer (HCC) 2001   • Cholelithiasis 07/1997   • Coronary artery disease 2009   • Depression    • Diabetes mellitus (HCC)    • GERD (gastroesophageal reflux disease)    • Heart problem    • Hyperlipidemia    • Hypertension 2009   • Memory problem    • Sleep apnea    • Visual impairment      Past Surgical History:  Past Surgical History:   Procedure Laterality Date   • CARDIAC SURGERY  08/08/2009   • CHOLECYSTECTOMY     • CORONARY ARTERY BYPASS GRAFT     • CORONARY STENT PLACEMENT  2009 and 2010   • TUBAL ABDOMINAL LIGATION  09/1983     Social History:  reports that she has been smoking cigarettes. She has a 22.50 pack-year smoking history. She has never used smokeless tobacco. She reports previous alcohol use. She reports that she  does not use drugs.    Family History: family history includes Cancer in her mother; Diabetes in her maternal grandmother; Hyperlipidemia in her sister; Hypertension in her sister.       Allergies:  Allergies   Allergen Reactions   • Fluoxetine Hcl Shortness Of Breath   • Levofloxacin Anaphylaxis   • Norfloxacin Shortness Of Breath     Medications:  Prior to Admission medications    Medication Sig Start Date End Date Taking? Authorizing Provider   budesonide-formoterol (Symbicort) 160-4.5 MCG/ACT inhaler Inhale 2 puffs 2 (Two) Times a Day. 11/22/21  Yes Bekah Lara APRN   carvedilol (COREG) 6.25 MG tablet Take 1 tablet by mouth 2 (Two) Times a Day With Meals. 11/5/21  Yes Nancy Rivera APRN   cloNIDine (CATAPRES) 0.1 MG tablet TAKE 1 TABLET BY MOUTH 2 TIMES A DAY. 12/13/21  Yes Alejandra Chaudhari DO   fluticasone (Flonase) 50 MCG/ACT nasal spray 2 sprays into the nostril(s) as directed by provider Daily. 11/22/21  Yes Bekah Lara APRN   Fluticasone-Umeclidin-Vilant (Trelegy Ellipta) 200-62.5-25 MCG/INH aerosol powder  Inhale 1 puff Daily. 3/1/21  Yes Alejandra Chaudhari DO   furosemide (LASIX) 40 MG tablet Take 1 tablet by mouth daily for 3 days, then may use daily as needed for weight gain, shortness of breath, or swelling. 11/5/21  Yes Nancy Rivera APRN   gabapentin (Neurontin) 300 MG capsule Take 1 capsule by mouth 3 (Three) Times a Day. 11/30/21  Yes Alejandra Chaudhari DO   glipizide (GLUCOTROL) 5 MG tablet TAKE ONE TABLET BY MOUTH TWICE A DAY 12/13/21  Yes Alejandra Chaudhari DO   HYDROcodone-acetaminophen (Norco)  MG per tablet Take 1 tablet by mouth Every 6 (Six) Hours As Needed for Moderate Pain . 11/30/21  Yes Alejandra Chaudhari DO   ipratropium-albuterol (DUO-NEB) 0.5-2.5 mg/3 ml nebulizer Take 3 mL by nebulization Every 4 (Four) Hours As Needed for Wheezing. 10/13/20  Yes Alejandra Chaudhari,    lamoTRIgine (LaMICtal) 25 MG tablet Take 1 tablet by mouth 2  "(Two) Times a Day. 1 pill daily for a week then 2 pills daily 7/20/21  Yes Alejandra Chaudhari DO   lisinopril (PRINIVIL,ZESTRIL) 10 MG tablet Take 10 mg by mouth Daily.   Yes ProviderJustin MD   nitroglycerin (NITROSTAT) 0.4 MG SL tablet Place 1 tablet under the tongue Daily As Needed for Chest Pain. 7/26/21  Yes Alejandra Chaudhari DO   Omega-3 Fatty Acids (fish oil) 1000 MG capsule capsule Take 1,000 mg by mouth 2 (Two) Times a Day With Meals. 6/18/20  Yes Justin Hernandez MD   omeprazole (priLOSEC) 40 MG capsule Take 1 capsule by mouth Daily. 8/19/21  Yes Alejandra Chaudhari DO   potassium chloride 10 MEQ CR tablet TAKE 1 TABLET BY MOUTH DAILY. 12/13/21  Yes Alejandra Chaudhari DO   venlafaxine XR (EFFEXOR-XR) 150 MG 24 hr capsule TAKE TWO CAPSULES BY MOUTH DAILY. 12/13/21  Yes Alejandra Chaudhari DO   Ventolin  (90 Base) MCG/ACT inhaler Inhale 1 puff Every 4 (Four) Hours As Needed for Wheezing. 10/21/21  Yes Alejandra Chaudhari DO   Xultophy 100-3.6 UNIT-MG/ML solution pen-injector subcutaneous pen Inject 30 Units as directed Every Night. 8/28/20  Yes Justin Hernandez MD   atorvastatin (Lipitor) 10 MG tablet Take 1 tablet by mouth Daily. 7/26/21 12/20/21  Alejandra Chaudhari DO   loratadine (CLARITIN) 10 MG tablet Take 1 tablet by mouth Daily As Needed for Allergies. 10/21/21 12/20/21  Alejandra Chaudhari DO       Objective     Vital Signs: /89 (BP Location: Left arm, Patient Position: Sitting, Cuff Size: Adult)   Pulse 94   Ht 165.1 cm (65\")   Wt 94.3 kg (208 lb)   Breastfeeding No   BMI 34.61 kg/m²   Physical Exam  Vitals reviewed.   Constitutional:       Appearance: She is obese.   HENT:      Head: Normocephalic and atraumatic.      Nose: Nose normal.   Eyes:      General: No scleral icterus.     Conjunctiva/sclera: Conjunctivae normal.   Cardiovascular:      Rate and Rhythm: Normal rate and regular rhythm.      Heart sounds: Normal heart sounds.   Pulmonary:      Effort: " Pulmonary effort is normal.      Breath sounds: Normal breath sounds.   Musculoskeletal:         General: No swelling or tenderness.      Cervical back: Normal range of motion and neck supple.   Skin:     General: Skin is warm and dry.   Neurological:      General: No focal deficit present.      Mental Status: She is alert.      Cranial Nerves: No cranial nerve deficit.   Psychiatric:         Mood and Affect: Mood normal.         Behavior: Behavior normal.       Patient's Body mass index is 34.61 kg/m². indicating that she is obese (BMI >30). Obesity-related health conditions include the following: none. Obesity is unchanged. BMI is is above average; BMI management plan is completed. We discussed portion control and increasing exercise..      Results Reviewed:  Glucose   Date Value Ref Range Status   11/22/2021 263 (H) 65 - 99 mg/dL Final   11/05/2021 187 (H) 65 - 99 mg/dL Final   06/16/2020 352 (H) 74 - 109 mg/dL Final     BUN   Date Value Ref Range Status   11/22/2021 8 6 - 24 mg/dL Final   11/05/2021 13 6 - 20 mg/dL Final   06/16/2020 9 6 - 20 mg/dL Final     Creatinine   Date Value Ref Range Status   11/22/2021 0.93 0.57 - 1.00 mg/dL Final   11/05/2021 0.94 0.57 - 1.00 mg/dL Final   06/16/2020 0.7 0.5 - 0.9 mg/dL Final     Sodium   Date Value Ref Range Status   11/22/2021 135 134 - 144 mmol/L Final   11/05/2021 135 (L) 136 - 145 mmol/L Final   06/16/2020 134 (L) 136 - 145 mmol/L Final     Potassium   Date Value Ref Range Status   11/22/2021 4.5 3.5 - 5.2 mmol/L Final   11/05/2021 3.4 (L) 3.5 - 5.2 mmol/L Final     Comment:     Slight hemolysis detected by analyzer. Results may be affected.   06/16/2020 3.4 (L) 3.5 - 5.0 mmol/L Final     Chloride   Date Value Ref Range Status   11/22/2021 90 (L) 96 - 106 mmol/L Final   11/05/2021 92 (L) 98 - 107 mmol/L Final   06/16/2020 92 (L) 98 - 111 mmol/L Final     CO2   Date Value Ref Range Status   11/05/2021 37.0 (H) 22.0 - 29.0 mmol/L Final   06/16/2020 29 22 - 29 mmol/L  Final     Total CO2   Date Value Ref Range Status   11/22/2021 31 (H) 20 - 29 mmol/L Final     Calcium   Date Value Ref Range Status   11/22/2021 9.6 8.7 - 10.2 mg/dL Final   11/05/2021 8.6 8.6 - 10.5 mg/dL Final   06/16/2020 8.5 (L) 8.6 - 10.0 mg/dL Final     ALT (SGPT)   Date Value Ref Range Status   11/02/2021 8 1 - 33 U/L Final   06/16/2020 8 5 - 33 U/L Final     AST (SGOT)   Date Value Ref Range Status   11/02/2021 11 1 - 32 U/L Final   06/16/2020 10 5 - 32 U/L Final     WBC   Date Value Ref Range Status   11/22/2021 9.0 3.4 - 10.8 x10E3/uL Final   06/16/2020 10.5 4.8 - 10.8 K/uL Final     Hematocrit   Date Value Ref Range Status   11/22/2021 42.4 34.0 - 46.6 % Final   11/05/2021 36.1 34.0 - 46.6 % Final   06/16/2020 39.7 37.0 - 47.0 % Final     Platelets   Date Value Ref Range Status   11/22/2021 313 150 - 450 x10E3/uL Final   11/05/2021 263 140 - 450 10*3/mm3 Final   06/16/2020 281 130 - 400 K/uL Final     Triglycerides   Date Value Ref Range Status   07/19/2021 167 (H) 0 - 149 mg/dL Final   06/16/2020 169 (H) 0 - 149 mg/dL Final     HDL Cholesterol   Date Value Ref Range Status   07/19/2021 53 >39 mg/dL Final   06/16/2020 51 (L) 65 - 121 mg/dL Final     Comment:     VALUES>60 MG/DL ARE ASSOCIATED WITH A DECREASED RISK OF  ATHEROSCLEROTIC CARDIOVASCULAR DISEASE     LDL Cholesterol    Date Value Ref Range Status   06/16/2020 136 <100 mg/dL Final     Comment:     <100 MG/DL=OPITIMAL    100-129 MG/DL=DESIRABLE    130-159 MG/DL BORDERLINE=INCREASED RISK OF ATHEROSCLEROTIC  CARDIOVASCULAR DISEASE    > OR = 160 MG/DL=ASSOCIATED WITH AN INCREASE RISK OF  ATHEROSCLEROTIC CARDIOVASCULAR DISEASE     LDL Chol Calc (NIH)   Date Value Ref Range Status   07/19/2021 189 (H) 0 - 99 mg/dL Final     Hemoglobin A1C   Date Value Ref Range Status   11/03/2021 8.30 (H) 4.80 - 5.60 % Final   06/16/2020 8.7 (H) 4.0 - 6.0 % Final     Comment:     HbA1c levels >6% are an indication of hyperglycemia during the preceding 2  to 3 months  or longer.    HbA1c levels may reach 20% or higher in poorly controlled diabetes.  Therapeutic action is suggested at levels above 8%.    Diabetes patients with HbA1c levels below 7% meet the goal of the American  Diabetes Association.    HbA1c levels below the established reference range may indicate recent  episodes of hypoglycemia, the presence of Hb variants, or shortened lifetime  of erythrocytes.      Assessment / Plan     Assessment/Plan:  1. Type 2 diabetes mellitus with diabetic neuropathy, with long-term current use of insulin (HCC)  - Microalbumin / Creatinine Urine Ratio - Urine, Clean Catch  - Semaglutide (Rybelsus) 7 MG tablet; Take 7 mg by mouth Daily.  Dispense: 30 tablet; Refill: 1  - Continuous Blood Gluc  (Dexcom G4 Plat Ped ) device; 1 Units Daily.  Dispense: 1 each; Refill: 0  - Continuous Blood Gluc Sensor (Dexcom G6 Sensor); Every 10 (Ten) Days.  Dispense: 3 each; Refill: 5  - Misc. Devices (Rollator Ultra-Light) misc; 1 application Continuous.  Dispense: 1 each; Refill: 0    2. Long term use of drug  - Compliance Drug Analysis, Ur - Urine, Clean Catch    3. Chronic left-sided low back pain with left-sided sciatica  - oxyCODONE-acetaminophen (Percocet)  MG per tablet; Take 1 tablet by mouth Every 6 (Six) Hours As Needed for Moderate Pain .  Dispense: 60 tablet; Refill: 0    4. Dysphagia, unspecified type  - Ambulatory Referral to Gastroenterology    Filled out form for patient to have handicap parking plate.   Gave sample of the Rybelsus 3 mg.     Return in about 4 weeks (around 1/17/2022) for Recheck, Next scheduled follow up. unless patient needs to be seen sooner or acute issues arise.    Code Status: Full    I have discussed the patient results/orders and and plan/recommendation with them at today's visit.      Alejandra Chaudhari, DO   12/20/2021        Answers for HPI/ROS submitted by the patient on 12/18/2021  Please describe your symptoms.: urine test  Have you had  these symptoms before?: Yes  How long have you been having these symptoms?: Greater than 2 weeks  What is the primary reason for your visit?: Other

## 2021-12-22 DIAGNOSIS — G89.29 CHRONIC LOW BACK PAIN WITH LEFT-SIDED SCIATICA, UNSPECIFIED BACK PAIN LATERALITY: ICD-10-CM

## 2021-12-22 DIAGNOSIS — M54.42 CHRONIC LOW BACK PAIN WITH LEFT-SIDED SCIATICA, UNSPECIFIED BACK PAIN LATERALITY: ICD-10-CM

## 2021-12-22 RX ORDER — GABAPENTIN 300 MG/1
CAPSULE ORAL
Qty: 90 CAPSULE | Refills: 0 | Status: SHIPPED | OUTPATIENT
Start: 2021-12-22 | End: 2022-01-18 | Stop reason: SDUPTHER

## 2021-12-22 NOTE — TELEPHONE ENCOUNTER
Rx Refill Note  Requested Prescriptions     Pending Prescriptions Disp Refills   • gabapentin (NEURONTIN) 300 MG capsule [Pharmacy Med Name: GABAPENTIN 300 MG CAPS 300 Capsule] 90 capsule 0     Sig: TAKE 1 CAPSULE BY MOUTH 3 TIMES A DAY.      Last office visit with prescribing clinician: 12/20/2021      Next office visit with prescribing clinician: 1/17/2022     Compliance Drug Analysis, Ur - Urine, Clean Catch (12/20/2021 10:52)         Anne-Marie Jj MA  12/22/21, 10:24 CST

## 2021-12-27 LAB
ALBUMIN/CREAT UR: <8 MG/G CREAT (ref 0–29)
CREAT UR-MCNC: 38 MG/DL
DRUGS UR: NORMAL
MICROALBUMIN UR-MCNC: <3 UG/ML

## 2022-01-03 DIAGNOSIS — K21.9 GASTROESOPHAGEAL REFLUX DISEASE WITHOUT ESOPHAGITIS: ICD-10-CM

## 2022-01-04 RX ORDER — OMEPRAZOLE 40 MG/1
40 CAPSULE, DELAYED RELEASE ORAL DAILY
Qty: 90 CAPSULE | Refills: 1 | Status: SHIPPED | OUTPATIENT
Start: 2022-01-04 | End: 2022-06-28

## 2022-01-06 ENCOUNTER — TELEPHONE (OUTPATIENT)
Dept: INTERNAL MEDICINE | Facility: CLINIC | Age: 62
End: 2022-01-06

## 2022-01-06 NOTE — TELEPHONE ENCOUNTER
Caller: Nora Carney    Relationship: Self    Best call back number: [phone]    What is the best time to reach you: ANY    Who are you requesting to speak with (clinical staff, provider,  specific staff member): CLINICAL        What was the call regarding: PATIENT CALLED TO RESCHEDULE HER NURSE VISIT FOR 1/6/22. UNABLE TO WARM TRANSFER. PLEASE CALL PT BACK TO RESCHEDULE.    Do you require a callback: YES

## 2022-01-11 ENCOUNTER — TELEPHONE (OUTPATIENT)
Dept: INTERNAL MEDICINE | Facility: CLINIC | Age: 62
End: 2022-01-11

## 2022-01-11 NOTE — TELEPHONE ENCOUNTER
Caller: Nora Carney    Relationship: Self    Best call back number: [phone]        Who are you requesting to speak with (clinical staff, provider,  specific staff member): CLINICAL STAFF     Do you know the name of the person who called:     What was the call regarding: PATIENT SAW LABS IN MYCHART-NEEDS EXPLANATION REGARDING LABS      Do you require a callback: YES

## 2022-01-11 NOTE — TELEPHONE ENCOUNTER
Caller: Nora Carney    Relationship to patient: Self    Best call back number: [phone]       Requested date: Friday, 1/14/22 OR Monday 1/17/22-AFTERNOON AFTER 12:00 OR 1:00    If rescheduling, when is the original appointment: 1/10/22--NURSE VISIT    Additional notes:PATIENT DID NOT WANT MY HELP

## 2022-01-11 NOTE — TELEPHONE ENCOUNTER
Pt has not had a ride to get to our office,  she is scheduled for Monday for a UDS    she just wanted you know why she was not able to get here.   I told her I would talk with you and let her know.

## 2022-01-18 ENCOUNTER — TELEMEDICINE (OUTPATIENT)
Dept: INTERNAL MEDICINE | Facility: CLINIC | Age: 62
End: 2022-01-18

## 2022-01-18 DIAGNOSIS — G47.00 INSOMNIA, UNSPECIFIED TYPE: ICD-10-CM

## 2022-01-18 DIAGNOSIS — M54.42 CHRONIC LEFT-SIDED LOW BACK PAIN WITH LEFT-SIDED SCIATICA: ICD-10-CM

## 2022-01-18 DIAGNOSIS — G89.29 CHRONIC LOW BACK PAIN WITH LEFT-SIDED SCIATICA, UNSPECIFIED BACK PAIN LATERALITY: ICD-10-CM

## 2022-01-18 DIAGNOSIS — J01.10 ACUTE NON-RECURRENT FRONTAL SINUSITIS: ICD-10-CM

## 2022-01-18 DIAGNOSIS — B02.9 HERPES ZOSTER WITHOUT COMPLICATION: Primary | ICD-10-CM

## 2022-01-18 DIAGNOSIS — G89.29 CHRONIC LEFT-SIDED LOW BACK PAIN WITH LEFT-SIDED SCIATICA: ICD-10-CM

## 2022-01-18 DIAGNOSIS — M54.42 CHRONIC LOW BACK PAIN WITH LEFT-SIDED SCIATICA, UNSPECIFIED BACK PAIN LATERALITY: ICD-10-CM

## 2022-01-18 PROCEDURE — 99214 OFFICE O/P EST MOD 30 MIN: CPT | Performed by: INTERNAL MEDICINE

## 2022-01-18 RX ORDER — GABAPENTIN 300 MG/1
300 CAPSULE ORAL 3 TIMES DAILY
Qty: 90 CAPSULE | Refills: 0 | Status: SHIPPED | OUTPATIENT
Start: 2022-01-18 | End: 2022-03-03

## 2022-01-18 RX ORDER — OXYCODONE AND ACETAMINOPHEN 10; 325 MG/1; MG/1
1 TABLET ORAL EVERY 6 HOURS PRN
Qty: 60 TABLET | Refills: 0 | Status: SHIPPED | OUTPATIENT
Start: 2022-01-18 | End: 2022-02-15 | Stop reason: SDUPTHER

## 2022-01-18 RX ORDER — LIDOCAINE 50 MG/G
1 PATCH TOPICAL EVERY 24 HOURS
Qty: 30 PATCH | Refills: 1 | Status: SHIPPED | OUTPATIENT
Start: 2022-01-18 | End: 2022-01-25 | Stop reason: SDUPTHER

## 2022-01-18 RX ORDER — TEMAZEPAM 22.5 MG/1
22.5 CAPSULE ORAL NIGHTLY PRN
Qty: 30 CAPSULE | Refills: 0 | Status: SHIPPED | OUTPATIENT
Start: 2022-01-18 | End: 2022-01-25 | Stop reason: SDUPTHER

## 2022-01-18 RX ORDER — DEXAMETHASONE 6 MG/1
6 TABLET ORAL
Qty: 10 TABLET | Refills: 0 | Status: SHIPPED | OUTPATIENT
Start: 2022-01-18

## 2022-01-18 RX ORDER — AZITHROMYCIN 250 MG/1
TABLET, FILM COATED ORAL
Qty: 6 TABLET | Refills: 0 | Status: SHIPPED | OUTPATIENT
Start: 2022-01-18 | End: 2022-02-15

## 2022-01-18 RX ORDER — ACYCLOVIR 800 MG/1
800 TABLET ORAL 2 TIMES DAILY
Qty: 16 TABLET | Refills: 1 | Status: SHIPPED | OUTPATIENT
Start: 2022-01-18

## 2022-01-18 NOTE — PROGRESS NOTES
Subjective     Sinus issues. Concern for COVID.     History of Present Illness  Patient seen on video visit.   Patient needs UDS.   Patient does not feel good. Patient has been around 5 people who have tested positive for COVID.     A lot of trouble sleeping. Goes to sleep but wakes up every two hours.     Patient's PMR from outside medical facility reviewed and noted.    Review of Systems   Constitutional: Negative for chills and fever.   HENT: Positive for congestion and rhinorrhea.    Respiratory: Negative for cough and shortness of breath.    Cardiovascular: Negative for chest pain and leg swelling.   Gastrointestinal: Negative for diarrhea and nausea.   Genitourinary: Negative for dysuria and hematuria.   Musculoskeletal: Positive for arthralgias and back pain.        Chronic pain, controlled with medications.    Skin: Positive for color change and wound.      Otherwise complete ROS reviewed and negative except as mentioned in the HPI.    Past Medical History:   Past Medical History:   Diagnosis Date   • Anxiety    • Arthritis    • Asthma 1995   • Cancer (HCC) 2001   • Cholelithiasis 07/1997   • Coronary artery disease 2009   • Depression    • Diabetes mellitus (HCC)    • GERD (gastroesophageal reflux disease)    • Heart problem    • Hyperlipidemia    • Hypertension 2009   • Memory problem    • Sleep apnea    • Visual impairment      Past Surgical History:  Past Surgical History:   Procedure Laterality Date   • CARDIAC SURGERY  08/08/2009   • CHOLECYSTECTOMY     • COLONOSCOPY  09/21/2012    colon polyps  diverticulosis  diarrhea per dr. magdaleno   • CORONARY ARTERY BYPASS GRAFT     • CORONARY STENT PLACEMENT  2009 and 2010   • ENDOSCOPY  09/21/2012    normal esophagus a single small papula nodule with no bleding and no stigmata of recent bleeding eas found in the stomach  this was biopsied  normal duodenal bulb and 2nd part of the duodenym  biopsy was per formed  biopies were taken with a cold forceps for  evalution of celiac disease by sharla pride   • TUBAL ABDOMINAL LIGATION  09/1983     Social History:  reports that she has been smoking cigarettes. She has a 22.50 pack-year smoking history. She has never used smokeless tobacco. She reports previous alcohol use. She reports that she does not use drugs.    Family History: family history includes Cancer in her mother; Diabetes in her maternal grandmother; Hyperlipidemia in her sister; Hypertension in her sister.       Allergies:  Allergies   Allergen Reactions   • Fluoxetine Hcl Shortness Of Breath   • Levofloxacin Anaphylaxis   • Norfloxacin Shortness Of Breath     Medications:  Prior to Admission medications    Medication Sig Start Date End Date Taking? Authorizing Provider   budesonide-formoterol (Symbicort) 160-4.5 MCG/ACT inhaler Inhale 2 puffs 2 (Two) Times a Day. 11/22/21   Bekah Lara APRN   carvedilol (COREG) 6.25 MG tablet Take 1 tablet by mouth 2 (Two) Times a Day With Meals. 11/5/21   Nancy Rivera APRN   cloNIDine (CATAPRES) 0.1 MG tablet TAKE 1 TABLET BY MOUTH 2 TIMES A DAY. 12/13/21   Alejandra Chaudhari DO   Continuous Blood Gluc  (Dexcom G4 Plat Ped ) device 1 Units Daily. 12/20/21   Alejandra Chaudhari DO   Continuous Blood Gluc Sensor (Dexcom G6 Sensor) Every 10 (Ten) Days. 12/20/21   Alejandra Chaudhari DO   fluticasone (Flonase) 50 MCG/ACT nasal spray 2 sprays into the nostril(s) as directed by provider Daily. 11/22/21   Bekah Lara APRN   Fluticasone-Umeclidin-Vilant (Trelegy Ellipta) 200-62.5-25 MCG/INH aerosol powder  Inhale 1 puff Daily. 3/1/21   Alejandra Chaudhari DO   furosemide (LASIX) 40 MG tablet Take 1 tablet by mouth daily for 3 days, then may use daily as needed for weight gain, shortness of breath, or swelling. 11/5/21   Nancy Rivera APRN   gabapentin (NEURONTIN) 300 MG capsule TAKE 1 CAPSULE BY MOUTH 3 TIMES A DAY. 12/22/21   Alejandra Chaudhari DO   glipizide (GLUCOTROL) 5 MG  tablet TAKE ONE TABLET BY MOUTH TWICE A DAY 12/13/21   Alejandra Chaudhari DO   ipratropium-albuterol (DUO-NEB) 0.5-2.5 mg/3 ml nebulizer Take 3 mL by nebulization Every 4 (Four) Hours As Needed for Wheezing. 10/13/20   Alejandra Chaudhari DO   lamoTRIgine (LaMICtal) 25 MG tablet Take 1 tablet by mouth 2 (Two) Times a Day. 1 pill daily for a week then 2 pills daily 7/20/21   Alejandra Chaudhari DO   lisinopril (PRINIVIL,ZESTRIL) 10 MG tablet Take 10 mg by mouth Daily.    Provider, MD Justin   Misc. Devices (Rollator Ultra-Light) misc 1 application Continuous. 12/20/21   Alejandra Chaudhari DO   nitroglycerin (NITROSTAT) 0.4 MG SL tablet Place 1 tablet under the tongue Daily As Needed for Chest Pain. 7/26/21   Alejandra Chaudhari DO   Omega-3 Fatty Acids (fish oil) 1000 MG capsule capsule Take 1,000 mg by mouth 2 (Two) Times a Day With Meals. 6/18/20   Provider, MD Justin   omeprazole (priLOSEC) 40 MG capsule TAKE 1 CAPSULE BY MOUTH DAILY. 1/4/22   Alejandra Chaudhari DO   oxyCODONE-acetaminophen (Percocet)  MG per tablet Take 1 tablet by mouth Every 6 (Six) Hours As Needed for Moderate Pain . 12/20/21   Alejandra Chaudhari DO   potassium chloride 10 MEQ CR tablet TAKE 1 TABLET BY MOUTH DAILY. 12/13/21   Alejandra Chaudhari DO   Semaglutide (Rybelsus) 7 MG tablet Take 7 mg by mouth Daily. 12/20/21   Alejandra Chaudhari DO   venlafaxine XR (EFFEXOR-XR) 150 MG 24 hr capsule TAKE TWO CAPSULES BY MOUTH DAILY. 12/13/21   Alejandra Chaudhari DO   Ventolin  (90 Base) MCG/ACT inhaler Inhale 1 puff Every 4 (Four) Hours As Needed for Wheezing. 10/21/21   Alejandra Chaudhari DO       Objective     Vital Signs: There were no vitals taken for this visit.  Physical Exam  Left leg shingles lesion. Red and dark. Circumscribed on the leg.  Patient seen on video visit.     Patient's There is no height or weight on file to calculate BMI. indicating that she is within normal range (BMI 18.5-24.9). No BMI  management plan needed..      Results Reviewed:  Glucose   Date Value Ref Range Status   11/22/2021 263 (H) 65 - 99 mg/dL Final   11/05/2021 187 (H) 65 - 99 mg/dL Final   06/16/2020 352 (H) 74 - 109 mg/dL Final     BUN   Date Value Ref Range Status   11/22/2021 8 6 - 24 mg/dL Final   11/05/2021 13 6 - 20 mg/dL Final   06/16/2020 9 6 - 20 mg/dL Final     Creatinine   Date Value Ref Range Status   11/22/2021 0.93 0.57 - 1.00 mg/dL Final   11/05/2021 0.94 0.57 - 1.00 mg/dL Final   06/16/2020 0.7 0.5 - 0.9 mg/dL Final     Sodium   Date Value Ref Range Status   11/22/2021 135 134 - 144 mmol/L Final   11/05/2021 135 (L) 136 - 145 mmol/L Final   06/16/2020 134 (L) 136 - 145 mmol/L Final     Potassium   Date Value Ref Range Status   11/22/2021 4.5 3.5 - 5.2 mmol/L Final   11/05/2021 3.4 (L) 3.5 - 5.2 mmol/L Final     Comment:     Slight hemolysis detected by analyzer. Results may be affected.   06/16/2020 3.4 (L) 3.5 - 5.0 mmol/L Final     Chloride   Date Value Ref Range Status   11/22/2021 90 (L) 96 - 106 mmol/L Final   11/05/2021 92 (L) 98 - 107 mmol/L Final   06/16/2020 92 (L) 98 - 111 mmol/L Final     CO2   Date Value Ref Range Status   11/05/2021 37.0 (H) 22.0 - 29.0 mmol/L Final   06/16/2020 29 22 - 29 mmol/L Final     Total CO2   Date Value Ref Range Status   11/22/2021 31 (H) 20 - 29 mmol/L Final     Calcium   Date Value Ref Range Status   11/22/2021 9.6 8.7 - 10.2 mg/dL Final   11/05/2021 8.6 8.6 - 10.5 mg/dL Final   06/16/2020 8.5 (L) 8.6 - 10.0 mg/dL Final     ALT (SGPT)   Date Value Ref Range Status   11/02/2021 8 1 - 33 U/L Final   06/16/2020 8 5 - 33 U/L Final     AST (SGOT)   Date Value Ref Range Status   11/02/2021 11 1 - 32 U/L Final   06/16/2020 10 5 - 32 U/L Final     WBC   Date Value Ref Range Status   11/22/2021 9.0 3.4 - 10.8 x10E3/uL Final   06/16/2020 10.5 4.8 - 10.8 K/uL Final     Hematocrit   Date Value Ref Range Status   11/22/2021 42.4 34.0 - 46.6 % Final   11/05/2021 36.1 34.0 - 46.6 % Final    06/16/2020 39.7 37.0 - 47.0 % Final     Platelets   Date Value Ref Range Status   11/22/2021 313 150 - 450 x10E3/uL Final   11/05/2021 263 140 - 450 10*3/mm3 Final   06/16/2020 281 130 - 400 K/uL Final     Triglycerides   Date Value Ref Range Status   07/19/2021 167 (H) 0 - 149 mg/dL Final   06/16/2020 169 (H) 0 - 149 mg/dL Final     HDL Cholesterol   Date Value Ref Range Status   07/19/2021 53 >39 mg/dL Final   06/16/2020 51 (L) 65 - 121 mg/dL Final     Comment:     VALUES>60 MG/DL ARE ASSOCIATED WITH A DECREASED RISK OF  ATHEROSCLEROTIC CARDIOVASCULAR DISEASE     LDL Cholesterol    Date Value Ref Range Status   06/16/2020 136 <100 mg/dL Final     Comment:     <100 MG/DL=OPITIMAL    100-129 MG/DL=DESIRABLE    130-159 MG/DL BORDERLINE=INCREASED RISK OF ATHEROSCLEROTIC  CARDIOVASCULAR DISEASE    > OR = 160 MG/DL=ASSOCIATED WITH AN INCREASE RISK OF  ATHEROSCLEROTIC CARDIOVASCULAR DISEASE     LDL Chol Calc (NIH)   Date Value Ref Range Status   07/19/2021 189 (H) 0 - 99 mg/dL Final     Hemoglobin A1C   Date Value Ref Range Status   11/03/2021 8.30 (H) 4.80 - 5.60 % Final   06/16/2020 8.7 (H) 4.0 - 6.0 % Final     Comment:     HbA1c levels >6% are an indication of hyperglycemia during the preceding 2  to 3 months or longer.    HbA1c levels may reach 20% or higher in poorly controlled diabetes.  Therapeutic action is suggested at levels above 8%.    Diabetes patients with HbA1c levels below 7% meet the goal of the American  Diabetes Association.    HbA1c levels below the established reference range may indicate recent  episodes of hypoglycemia, the presence of Hb variants, or shortened lifetime  of erythrocytes.        Assessment / Plan     Assessment/Plan:  1. Herpes zoster without complication  - acyclovir (Zovirax) 800 MG tablet; Take 1 tablet by mouth 2 (Two) Times a Day.  Dispense: 16 tablet; Refill: 1  - lidocaine (LIDODERM) 5 %; Place 1 patch on the skin as directed by provider Daily. Remove & Discard patch  within 12 hours or as directed by MD  Dispense: 30 patch; Refill: 1    2. Acute non-recurrent frontal sinusitis  - azithromycin (Zithromax Z-Nasim) 250 MG tablet; Take 2 tablets the first day, then 1 tablet daily for 4 days.  Dispense: 6 tablet; Refill: 0  - dexamethasone (DECADRON) 6 MG tablet; Take 1 tablet by mouth Daily With Breakfast.  Dispense: 10 tablet; Refill: 0    3. Chronic left-sided low back pain with left-sided sciatica  - oxyCODONE-acetaminophen (Percocet)  MG per tablet; Take 1 tablet by mouth Every 6 (Six) Hours As Needed for Moderate Pain .  Dispense: 60 tablet; Refill: 0    4. Chronic low back pain with left-sided sciatica, unspecified back pain laterality  - gabapentin (NEURONTIN) 300 MG capsule; Take 1 capsule by mouth 3 (Three) Times a Day.  Dispense: 90 capsule; Refill: 0    5. Insomnia  - Restoril 22.5 MG po nightly PRN #[id number]      Return in about 3 months (around 4/18/2022) for Recheck, Next scheduled follow up. unless patient needs to be seen sooner or acute issues arise.    Code Status: Full    I have discussed the patient results/orders and and plan/recommendation with them at today's visit.      Alejandra Chaudhari,    01/18/2022

## 2022-01-25 DIAGNOSIS — G47.00 INSOMNIA, UNSPECIFIED TYPE: ICD-10-CM

## 2022-01-25 DIAGNOSIS — B02.9 HERPES ZOSTER WITHOUT COMPLICATION: ICD-10-CM

## 2022-01-25 NOTE — TELEPHONE ENCOUNTER
Rx Refill Note  Requested Prescriptions     Pending Prescriptions Disp Refills   • temazepam (Restoril) 22.5 MG capsule 30 capsule 0     Sig: Take 1 capsule by mouth At Night As Needed for Sleep.   • lidocaine (LIDODERM) 5 % 30 patch 1     Sig: Place 1 patch on the skin as directed by provider Daily. Remove & Discard patch within 12 hours or as directed by MD      Last office visit with prescribing clinician: 12/20/2021      Next office visit with prescribing clinician: Visit date not found            Anne-Marie Jj MA  01/25/22, 15:49 CST

## 2022-01-27 RX ORDER — TEMAZEPAM 22.5 MG/1
22.5 CAPSULE ORAL NIGHTLY PRN
Qty: 30 CAPSULE | Refills: 0 | Status: SHIPPED | OUTPATIENT
Start: 2022-01-27 | End: 2022-03-14 | Stop reason: SDUPTHER

## 2022-01-27 RX ORDER — LIDOCAINE 50 MG/G
1 PATCH TOPICAL EVERY 24 HOURS
Qty: 30 PATCH | Refills: 1 | Status: SHIPPED | OUTPATIENT
Start: 2022-01-27 | End: 2022-06-06

## 2022-02-01 ENCOUNTER — CLINICAL SUPPORT (OUTPATIENT)
Dept: INTERNAL MEDICINE | Facility: CLINIC | Age: 62
End: 2022-02-01

## 2022-02-01 ENCOUNTER — APPOINTMENT (OUTPATIENT)
Dept: BONE DENSITY | Facility: HOSPITAL | Age: 62
End: 2022-02-01

## 2022-02-01 DIAGNOSIS — Z79.899 LONG TERM USE OF DRUG: Primary | ICD-10-CM

## 2022-02-01 RX ORDER — (INSULIN DEGLUDEC AND LIRAGLUTIDE) 100; 3.6 [IU]/ML; MG/ML
INJECTION, SOLUTION SUBCUTANEOUS
Qty: 15 ML | Refills: 5 | Status: SHIPPED | OUTPATIENT
Start: 2022-02-01

## 2022-02-02 ENCOUNTER — TELEPHONE (OUTPATIENT)
Dept: INTERNAL MEDICINE | Facility: CLINIC | Age: 62
End: 2022-02-02

## 2022-02-02 RX ORDER — LISINOPRIL 10 MG/1
TABLET ORAL
Qty: 30 TABLET | Refills: 3 | Status: SHIPPED | OUTPATIENT
Start: 2022-02-02 | End: 2022-06-06

## 2022-02-02 NOTE — TELEPHONE ENCOUNTER
PATIENT CALLED STATING SHE CALLED LAST WEEK FOR HER REFILL AND EVERYTHING WAS SENT OVER, PATIENT SEEN THIS IN HER MY [facility] BUT THE PHARMACY IS TELLING HER THEY DO NOT HAVE ANYTHING. SHE IS WANTING TO SEE IF SOMEONE COULD CALL HER PHARMACY AND SEE WHAT IS GOING ON     GOOD CONTACT FOR PATIENT [phone]    PHARMACY INFORMATION   [facility] - [place], [place] - [address] [place] [address] [phone] [facility] [phone] FX

## 2022-02-02 NOTE — TELEPHONE ENCOUNTER
Rx Refill Note  Requested Prescriptions     Pending Prescriptions Disp Refills   • lisinopril (PRINIVIL,ZESTRIL) 10 MG tablet [Pharmacy Med Name: LISINOPRIL 10 MG TAB 10 Tablet] 30 tablet 3     Sig: TAKE 1 TABLET BY MOUTH DAILY.      Last office visit with prescribing clinician: 12/20/2021      Next office visit with prescribing clinician: 2/15/2022            Anne-Marie Jj MA  02/02/22, 11:55 CST

## 2022-02-08 ENCOUNTER — PRIOR AUTHORIZATION (OUTPATIENT)
Dept: INTERNAL MEDICINE | Facility: CLINIC | Age: 62
End: 2022-02-08

## 2022-02-08 LAB — DRUGS UR: NORMAL

## 2022-02-08 NOTE — TELEPHONE ENCOUNTER
PA completed by Lori     Temazepam 22.5MG capsules    Key: [id number]   PA Case ID: [id number]   Rx #: [id number]      Approved on February 2  PA Case: [id number], Status: Approved, Coverage Starts on: 11/3/2021 12:00:00 AM, Coverage Ends on: 2/2/2023 12:00:00 AM.    PA APPROVED

## 2022-02-08 NOTE — TELEPHONE ENCOUNTER
Lidocaine 5% patches    Key: [id number]   PA Case ID: [id number]   Rx #: [id number]    Approvedon February 2  PA Case: [id number], Status: Approved, Coverage Starts on: 11/3/2021 12:00:00 AM, Coverage Ends on: 2/2/2023 12:00:00 AM.    PA completed by dacia

## 2022-02-15 ENCOUNTER — OFFICE VISIT (OUTPATIENT)
Dept: INTERNAL MEDICINE | Facility: CLINIC | Age: 62
End: 2022-02-15

## 2022-02-15 VITALS
BODY MASS INDEX: 34.99 KG/M2 | WEIGHT: 210 LBS | TEMPERATURE: 97.7 F | HEART RATE: 91 BPM | HEIGHT: 65 IN | OXYGEN SATURATION: 96 % | SYSTOLIC BLOOD PRESSURE: 180 MMHG | DIASTOLIC BLOOD PRESSURE: 73 MMHG

## 2022-02-15 DIAGNOSIS — R60.0 LOCALIZED EDEMA: ICD-10-CM

## 2022-02-15 DIAGNOSIS — M54.42 CHRONIC LEFT-SIDED LOW BACK PAIN WITH LEFT-SIDED SCIATICA: ICD-10-CM

## 2022-02-15 DIAGNOSIS — G89.29 CHRONIC LEFT-SIDED LOW BACK PAIN WITH LEFT-SIDED SCIATICA: ICD-10-CM

## 2022-02-15 DIAGNOSIS — J06.9 UPPER RESPIRATORY TRACT INFECTION, UNSPECIFIED TYPE: Primary | ICD-10-CM

## 2022-02-15 PROCEDURE — 99214 OFFICE O/P EST MOD 30 MIN: CPT | Performed by: INTERNAL MEDICINE

## 2022-02-15 PROCEDURE — 96372 THER/PROPH/DIAG INJ SC/IM: CPT | Performed by: INTERNAL MEDICINE

## 2022-02-15 RX ORDER — METHYLPREDNISOLONE SODIUM SUCCINATE 125 MG/2ML
80 INJECTION, POWDER, LYOPHILIZED, FOR SOLUTION INTRAMUSCULAR; INTRAVENOUS ONCE
Status: COMPLETED | OUTPATIENT
Start: 2022-02-15 | End: 2022-02-15

## 2022-02-15 RX ORDER — FUROSEMIDE 40 MG/1
TABLET ORAL
Qty: 30 TABLET | Refills: 1 | Status: SHIPPED | OUTPATIENT
Start: 2022-02-15 | End: 2022-04-13

## 2022-02-15 RX ORDER — METHYLPREDNISOLONE 4 MG/1
TABLET ORAL
Qty: 21 TABLET | Refills: 0 | Status: SHIPPED | OUTPATIENT
Start: 2022-02-15

## 2022-02-15 RX ORDER — GUAIFENESIN 600 MG/1
1200 TABLET, EXTENDED RELEASE ORAL 2 TIMES DAILY
Qty: 30 TABLET | Refills: 1 | Status: SHIPPED | OUTPATIENT
Start: 2022-02-15

## 2022-02-15 RX ORDER — OXYCODONE AND ACETAMINOPHEN 10; 325 MG/1; MG/1
1 TABLET ORAL EVERY 6 HOURS PRN
Qty: 60 TABLET | Refills: 0 | Status: SHIPPED | OUTPATIENT
Start: 2022-02-15 | End: 2022-03-14 | Stop reason: SDUPTHER

## 2022-02-15 RX ORDER — AZITHROMYCIN 250 MG/1
TABLET, FILM COATED ORAL
Qty: 6 TABLET | Refills: 0 | Status: SHIPPED | OUTPATIENT
Start: 2022-02-15

## 2022-02-15 RX ADMIN — METHYLPREDNISOLONE SODIUM SUCCINATE 80 MG: 125 INJECTION, POWDER, LYOPHILIZED, FOR SOLUTION INTRAMUSCULAR; INTRAVENOUS at 15:58

## 2022-02-15 NOTE — PROGRESS NOTES
Subjective     Chief Complaint   Patient presents with   • Diabetes     4 wk fu,  needs form filled out for shoes also       History of Present Illness  Patient states that she is feeling poorly. Unable to lie flat.   She took a lasix pill today and states that she whould have taken it sooner.   She dos have MDI at home.   SOA worse with activity.  Cough that is worse in the am.   Says that she has gained 5 lbs.     Needs diabetic shoe form filled out. She has her sensor in place. Glucose was 178.   Chronic back pain controlled with medications. Walking with a cane in the office.     Patient's PMR from outside medical facility reviewed and noted.    Review of Systems   Constitutional: Negative for chills and fever.   HENT: Negative for congestion and rhinorrhea.    Respiratory: Positive for cough and shortness of breath.    Cardiovascular: Positive for leg swelling. Negative for chest pain.   Gastrointestinal: Negative for constipation and diarrhea.   Genitourinary: Negative for dysuria and hematuria.      Otherwise complete ROS reviewed and negative except as mentioned in the HPI.    Past Medical History:   Past Medical History:   Diagnosis Date   • Anxiety    • Arthritis    • Asthma 1995   • Cancer (HCC) 2001   • Cholelithiasis 07/1997   • Coronary artery disease 2009   • Depression    • Diabetes mellitus (HCC)    • GERD (gastroesophageal reflux disease)    • Heart problem    • Hyperlipidemia    • Hypertension 2009   • Memory problem    • Sleep apnea    • Visual impairment      Past Surgical History:  Past Surgical History:   Procedure Laterality Date   • CARDIAC SURGERY  08/08/2009   • CHOLECYSTECTOMY     • COLONOSCOPY  09/21/2012    colon polyps  diverticulosis  diarrhea per dr. magdaleno   • CORONARY ARTERY BYPASS GRAFT     • CORONARY STENT PLACEMENT  2009 and 2010   • ENDOSCOPY  09/21/2012    normal esophagus a single small papula nodule with no bleding and no stigmata of recent bleeding eas found in the  stomach  this was biopsied  normal duodenal bulb and 2nd part of the duodenym  biopsy was per formed  biopies were taken with a cold forceps for evalution of celiac disease by sharla pride   • TUBAL ABDOMINAL LIGATION  09/1983     Social History:  reports that she has been smoking cigarettes. She has a 22.50 pack-year smoking history. She has never used smokeless tobacco. She reports previous alcohol use. She reports that she does not use drugs.    Family History: family history includes Cancer in her mother; Diabetes in her maternal grandmother; Hyperlipidemia in her sister; Hypertension in her sister.       Allergies:  Allergies   Allergen Reactions   • Fluoxetine Hcl Shortness Of Breath   • Levofloxacin Anaphylaxis   • Norfloxacin Shortness Of Breath     Medications:  Prior to Admission medications    Medication Sig Start Date End Date Taking? Authorizing Provider   acyclovir (Zovirax) 800 MG tablet Take 1 tablet by mouth 2 (Two) Times a Day. 1/18/22  Yes Alejandra Chaudhari DO   budesonide-formoterol (Symbicort) 160-4.5 MCG/ACT inhaler Inhale 2 puffs 2 (Two) Times a Day. 11/22/21  Yes Bekah Lara APRN   carvedilol (COREG) 6.25 MG tablet Take 1 tablet by mouth 2 (Two) Times a Day With Meals. 11/5/21  Yes Nancy Rivera APRN   cloNIDine (CATAPRES) 0.1 MG tablet TAKE 1 TABLET BY MOUTH 2 TIMES A DAY. 12/13/21  Yes Alejandra Chaudhari DO   Continuous Blood Gluc  (Dexcom G4 Plat Ped ) device 1 Units Daily. 12/20/21  Yes Alejandra Chaudhari DO   Continuous Blood Gluc Sensor (Dexcom G6 Sensor) Every 10 (Ten) Days. 12/20/21  Yes Alejandra Chaudhari DO   dexamethasone (DECADRON) 6 MG tablet Take 1 tablet by mouth Daily With Breakfast. 1/18/22  Yes Alejandra Chaudhari DO   fluticasone (Flonase) 50 MCG/ACT nasal spray 2 sprays into the nostril(s) as directed by provider Daily. 11/22/21  Yes Bekah Lara APRN   Fluticasone-Umeclidin-Vilant (Trelegy Ellipta) 200-62.5-25 MCG/INH  aerosol powder  Inhale 1 puff Daily. 3/1/21  Yes Alejandra Chaudhari DO   furosemide (LASIX) 40 MG tablet Take 1 tablet by mouth daily for 3 days, then may use daily as needed for weight gain, shortness of breath, or swelling. 11/5/21  Yes Nancy Rivera APRN   gabapentin (NEURONTIN) 300 MG capsule Take 1 capsule by mouth 3 (Three) Times a Day. 1/18/22  Yes Alejandra Chaudhari DO   glipizide (GLUCOTROL) 5 MG tablet TAKE ONE TABLET BY MOUTH TWICE A DAY 12/13/21  Yes Alejandra Chaudhari DO   ipratropium-albuterol (DUO-NEB) 0.5-2.5 mg/3 ml nebulizer Take 3 mL by nebulization Every 4 (Four) Hours As Needed for Wheezing. 10/13/20  Yes Alejandra Chaudhari DO   lidocaine (LIDODERM) 5 % Place 1 patch on the skin as directed by provider Daily. Remove & Discard patch within 12 hours or as directed by MD 1/27/22  Yes Alejandra Chaudhari DO   lisinopril (PRINIVIL,ZESTRIL) 10 MG tablet TAKE 1 TABLET BY MOUTH DAILY. 2/2/22  Yes Alejandra Chaudhari DO   Misc. Devices (Rollator Ultra-Light) misc 1 application Continuous. 12/20/21  Yes Alejandra Chaudhari DO   nitroglycerin (NITROSTAT) 0.4 MG SL tablet Place 1 tablet under the tongue Daily As Needed for Chest Pain. 7/26/21  Yes Alejandra Chaudhari DO   Omega-3 Fatty Acids (fish oil) 1000 MG capsule capsule Take 1,000 mg by mouth 2 (Two) Times a Day With Meals. 6/18/20  Yes Provider, MD Justin   omeprazole (priLOSEC) 40 MG capsule TAKE 1 CAPSULE BY MOUTH DAILY. 1/4/22  Yes Alejandra Chaudhari DO   oxyCODONE-acetaminophen (Percocet)  MG per tablet Take 1 tablet by mouth Every 6 (Six) Hours As Needed for Moderate Pain . 1/18/22  Yes Alejandra Chaudhari DO   potassium chloride 10 MEQ CR tablet TAKE 1 TABLET BY MOUTH DAILY. 12/13/21  Yes Alejandra Chaudhari, DO   Semaglutide (Rybelsus) 7 MG tablet Take 7 mg by mouth Daily. 12/20/21  Yes Alejandra Chaudhari, DO   temazepam (Restoril) 22.5 MG capsule Take 1 capsule by mouth At Night As Needed for Sleep. 1/27/22  Yes Watson,  "Alejandra Trimble DO   venlafaxine XR (EFFEXOR-XR) 150 MG 24 hr capsule TAKE TWO CAPSULES BY MOUTH DAILY. 12/13/21  Yes Alejandra Chaudhari DO   Ventolin  (90 Base) MCG/ACT inhaler Inhale 1 puff Every 4 (Four) Hours As Needed for Wheezing. 10/21/21  Yes Alejandra Chaudhari DO   Xultophy 100-3.6 UNIT-MG/ML solution pen-injector subcutaneous pen INJECT 26 UNITS DAILY. ADJUST DOSE UP OR DOWN BY 2 UNITS EVERY 4 DAYS UNTIL AM FASTING SUGAR IS . MAX DOSE 50 UNITS. REPLACES LANTUS. 2/1/22  Yes Alejandra Chaudhari DO   lamoTRIgine (LaMICtal) 25 MG tablet Take 1 tablet by mouth 2 (Two) Times a Day. 1 pill daily for a week then 2 pills daily 7/20/21   Alejandra Chaudhari DO   azithromycin (Zithromax Z-Nasim) 250 MG tablet Take 2 tablets the first day, then 1 tablet daily for 4 days. 1/18/22 2/15/22  Alejandra Chaudhari DO       Objective     Vital Signs: /73 (BP Location: Right arm, Patient Position: Sitting, Cuff Size: Large Adult)   Pulse 91   Temp 97.7 °F (36.5 °C) (Infrared)   Ht 165.1 cm (65\")   Wt 95.3 kg (210 lb)   SpO2 96%   Breastfeeding No   BMI 34.95 kg/m²   Physical Exam  Vitals reviewed.   Constitutional:       Appearance: Normal appearance.   HENT:      Head: Normocephalic and atraumatic.      Nose: Nose normal.   Eyes:      General: No scleral icterus.     Conjunctiva/sclera: Conjunctivae normal.   Cardiovascular:      Rate and Rhythm: Normal rate and regular rhythm.      Heart sounds: Normal heart sounds.   Pulmonary:      Effort: Pulmonary effort is normal.      Breath sounds: Wheezing present.      Comments: Decreased air movement.   Musculoskeletal:         General: No tenderness.      Cervical back: Normal range of motion and neck supple.      Comments: Hammer toes on 2-3-4   Skin:     General: Skin is warm and dry.   Neurological:      General: No focal deficit present.      Mental Status: She is alert.      Cranial Nerves: No cranial nerve deficit.   Psychiatric:         Mood and " Affect: Mood normal.         Behavior: Behavior normal.       Patient's Body mass index is 34.95 kg/m². indicating that she is overweight (BMI 25-29.9). Patient's (Body mass index is 34.95 kg/m².) indicates that they are overweight with health conditions that include diabetes mellitus . Weight is unchanged. BMI is is above average; BMI management plan is completed. We discussed portion control and increasing exercise. .      Results Reviewed:  Glucose   Date Value Ref Range Status   11/22/2021 263 (H) 65 - 99 mg/dL Final   11/05/2021 187 (H) 65 - 99 mg/dL Final   06/16/2020 352 (H) 74 - 109 mg/dL Final     BUN   Date Value Ref Range Status   11/22/2021 8 6 - 24 mg/dL Final   11/05/2021 13 6 - 20 mg/dL Final   06/16/2020 9 6 - 20 mg/dL Final     Creatinine   Date Value Ref Range Status   11/22/2021 0.93 0.57 - 1.00 mg/dL Final   11/05/2021 0.94 0.57 - 1.00 mg/dL Final   06/16/2020 0.7 0.5 - 0.9 mg/dL Final     Sodium   Date Value Ref Range Status   11/22/2021 135 134 - 144 mmol/L Final   11/05/2021 135 (L) 136 - 145 mmol/L Final   06/16/2020 134 (L) 136 - 145 mmol/L Final     Potassium   Date Value Ref Range Status   11/22/2021 4.5 3.5 - 5.2 mmol/L Final   11/05/2021 3.4 (L) 3.5 - 5.2 mmol/L Final     Comment:     Slight hemolysis detected by analyzer. Results may be affected.   06/16/2020 3.4 (L) 3.5 - 5.0 mmol/L Final     Chloride   Date Value Ref Range Status   11/22/2021 90 (L) 96 - 106 mmol/L Final   11/05/2021 92 (L) 98 - 107 mmol/L Final   06/16/2020 92 (L) 98 - 111 mmol/L Final     CO2   Date Value Ref Range Status   11/05/2021 37.0 (H) 22.0 - 29.0 mmol/L Final   06/16/2020 29 22 - 29 mmol/L Final     Total CO2   Date Value Ref Range Status   11/22/2021 31 (H) 20 - 29 mmol/L Final     Calcium   Date Value Ref Range Status   11/22/2021 9.6 8.7 - 10.2 mg/dL Final   11/05/2021 8.6 8.6 - 10.5 mg/dL Final   06/16/2020 8.5 (L) 8.6 - 10.0 mg/dL Final     ALT (SGPT)   Date Value Ref Range Status   11/02/2021 8 1 - 33  U/L Final   06/16/2020 8 5 - 33 U/L Final     AST (SGOT)   Date Value Ref Range Status   11/02/2021 11 1 - 32 U/L Final   06/16/2020 10 5 - 32 U/L Final     WBC   Date Value Ref Range Status   11/22/2021 9.0 3.4 - 10.8 x10E3/uL Final   06/16/2020 10.5 4.8 - 10.8 K/uL Final     Hematocrit   Date Value Ref Range Status   11/22/2021 42.4 34.0 - 46.6 % Final   11/05/2021 36.1 34.0 - 46.6 % Final   06/16/2020 39.7 37.0 - 47.0 % Final     Platelets   Date Value Ref Range Status   11/22/2021 313 150 - 450 x10E3/uL Final   11/05/2021 263 140 - 450 10*3/mm3 Final   06/16/2020 281 130 - 400 K/uL Final     Triglycerides   Date Value Ref Range Status   07/19/2021 167 (H) 0 - 149 mg/dL Final   06/16/2020 169 (H) 0 - 149 mg/dL Final     HDL Cholesterol   Date Value Ref Range Status   07/19/2021 53 >39 mg/dL Final   06/16/2020 51 (L) 65 - 121 mg/dL Final     Comment:     VALUES>60 MG/DL ARE ASSOCIATED WITH A DECREASED RISK OF  ATHEROSCLEROTIC CARDIOVASCULAR DISEASE     LDL Cholesterol    Date Value Ref Range Status   06/16/2020 136 <100 mg/dL Final     Comment:     <100 MG/DL=OPITIMAL    100-129 MG/DL=DESIRABLE    130-159 MG/DL BORDERLINE=INCREASED RISK OF ATHEROSCLEROTIC  CARDIOVASCULAR DISEASE    > OR = 160 MG/DL=ASSOCIATED WITH AN INCREASE RISK OF  ATHEROSCLEROTIC CARDIOVASCULAR DISEASE     LDL Chol Calc (NIH)   Date Value Ref Range Status   07/19/2021 189 (H) 0 - 99 mg/dL Final     Hemoglobin A1C   Date Value Ref Range Status   11/03/2021 8.30 (H) 4.80 - 5.60 % Final   06/16/2020 8.7 (H) 4.0 - 6.0 % Final     Comment:     HbA1c levels >6% are an indication of hyperglycemia during the preceding 2  to 3 months or longer.    HbA1c levels may reach 20% or higher in poorly controlled diabetes.  Therapeutic action is suggested at levels above 8%.    Diabetes patients with HbA1c levels below 7% meet the goal of the American  Diabetes Association.    HbA1c levels below the established reference range may indicate recent  episodes of  hypoglycemia, the presence of Hb variants, or shortened lifetime  of erythrocytes.      Assessment / Plan     Assessment/Plan:  1. Upper respiratory tract infection, unspecified type  - azithromycin (Zithromax Z-Nasim) 250 MG tablet; Take 2 tablets by mouth on day 1, then 1 tablet daily on days 2-5  Dispense: 6 tablet; Refill: 0  - guaiFENesin (Mucinex) 600 MG 12 hr tablet; Take 2 tablets by mouth 2 (Two) Times a Day.  Dispense: 30 tablet; Refill: 1  - methylPREDNISolone (MEDROL) 4 MG dose pack; Take as directed on package instructions.  Dispense: 21 tablet; Refill: 0  - methylPREDNISolone sodium succinate (SOLU-Medrol) injection 80 mg    2. Localized edema  - furosemide (LASIX) 40 MG tablet; Take 1 tablet by mouth daily for 3 days, then may use daily as needed for weight gain, shortness of breath, or swelling.  Dispense: 30 tablet; Refill: 1    3. Chronic left-sided low back pain with left-sided sciatica  - oxyCODONE-acetaminophen (Percocet)  MG per tablet; Take 1 tablet by mouth Every 6 (Six) Hours As Needed for Moderate Pain .  Dispense: 60 tablet; Refill: 0        Return in about 3 months (around 5/15/2022) for Recheck, Next scheduled follow up. unless patient needs to be seen sooner or acute issues arise.    Code Status: Full    I have discussed the patient results/orders and and plan/recommendation with them at today's visit.      Alejandra Chaudhari, DO   02/15/2022        Answers for HPI/ROS submitted by the patient on 2/14/2022  Please describe your symptoms.: Regular office visits  Have you had these symptoms before?: No  How long have you been having these symptoms?: Greater than 2 weeks  What is the primary reason for your visit?: Other

## 2022-02-18 DIAGNOSIS — E87.6 HYPOKALEMIA: ICD-10-CM

## 2022-02-18 DIAGNOSIS — I10 ESSENTIAL HYPERTENSION: ICD-10-CM

## 2022-02-18 NOTE — TELEPHONE ENCOUNTER
Rx Refill Note  Requested Prescriptions     Pending Prescriptions Disp Refills   • potassium chloride 10 MEQ CR tablet [Pharmacy Med Name: POTASSIUM CHLORIDE ER 10 ME 10 Tablet] 30 tablet 1     Sig: TAKE 1 TABLET BY MOUTH DAILY.   • cloNIDine (CATAPRES) 0.1 MG tablet [Pharmacy Med Name: CLONIDINE HCL 0.1 MG TAB 0.1 Tablet] 60 tablet 1     Sig: TAKE ONE TABLET BY MOUTH TWICE A DAY   Med last filled:   potassium: 12/13/21   clonidine:  12/13/21  Last office visit with prescribing clinician: 2/15/2022      Next office visit with prescribing clinician: 5/16/2022            Rivka Sheppard RN  02/18/22, 15:08 CST

## 2022-02-18 NOTE — TELEPHONE ENCOUNTER
Rx Refill Note  Requested Prescriptions     Pending Prescriptions Disp Refills   • potassium chloride 10 MEQ CR tablet [Pharmacy Med Name: POTASSIUM CHLORIDE ER 10 ME 10 Tablet] 30 tablet 1     Sig: TAKE 1 TABLET BY MOUTH DAILY.   • cloNIDine (CATAPRES) 0.1 MG tablet [Pharmacy Med Name: CLONIDINE HCL 0.1 MG TAB 0.1 Tablet] 60 tablet 1     Sig: TAKE ONE TABLET BY MOUTH TWICE A DAY      Last office visit with prescribing clinician: 2/15/2022      Next office visit with prescribing clinician: 5/16/2022     Basic Metabolic Panel (11/22/2021 15:56)         Anne-Marie Jj MA  02/18/22, 15:07 CST

## 2022-02-21 RX ORDER — POTASSIUM CHLORIDE 750 MG/1
10 TABLET, FILM COATED, EXTENDED RELEASE ORAL DAILY
Qty: 30 TABLET | Refills: 1 | Status: SHIPPED | OUTPATIENT
Start: 2022-02-21

## 2022-02-21 RX ORDER — POTASSIUM CHLORIDE 750 MG/1
10 TABLET, FILM COATED, EXTENDED RELEASE ORAL DAILY
Qty: 30 TABLET | Refills: 1 | Status: SHIPPED | OUTPATIENT
Start: 2022-02-21 | End: 2022-02-21 | Stop reason: SDUPTHER

## 2022-02-21 RX ORDER — CLONIDINE HYDROCHLORIDE 0.1 MG/1
TABLET ORAL
Qty: 60 TABLET | Refills: 1 | Status: SHIPPED | OUTPATIENT
Start: 2022-02-21 | End: 2022-02-21 | Stop reason: SDUPTHER

## 2022-02-21 RX ORDER — CLONIDINE HYDROCHLORIDE 0.1 MG/1
0.1 TABLET ORAL 2 TIMES DAILY
Qty: 60 TABLET | Refills: 1 | Status: SHIPPED | OUTPATIENT
Start: 2022-02-21 | End: 2022-06-06

## 2022-02-21 RX ORDER — METOPROLOL SUCCINATE 25 MG/1
TABLET, EXTENDED RELEASE ORAL
Qty: 90 TABLET | Refills: 3 | OUTPATIENT
Start: 2022-02-21

## 2022-02-28 DIAGNOSIS — G89.29 CHRONIC LOW BACK PAIN WITH LEFT-SIDED SCIATICA, UNSPECIFIED BACK PAIN LATERALITY: ICD-10-CM

## 2022-02-28 DIAGNOSIS — M54.42 CHRONIC LOW BACK PAIN WITH LEFT-SIDED SCIATICA, UNSPECIFIED BACK PAIN LATERALITY: ICD-10-CM

## 2022-03-02 DIAGNOSIS — F33.1 MODERATE EPISODE OF RECURRENT MAJOR DEPRESSIVE DISORDER: ICD-10-CM

## 2022-03-02 NOTE — TELEPHONE ENCOUNTER
Rx Refill Note  Requested Prescriptions     Pending Prescriptions Disp Refills   • venlafaxine XR (EFFEXOR-XR) 150 MG 24 hr capsule [Pharmacy Med Name: VENLAFAXINE HCL  MG C 150 Capsule] 60 capsule 1     Sig: TAKE TWO CAPSULES BY MOUTH DAILY      Last office visit with prescribing clinician: 2/15/2022      Next office visit with prescribing clinician: 5/16/2022            Anne-Marie Jj MA  03/02/22, 12:19 CST

## 2022-03-03 RX ORDER — VENLAFAXINE HYDROCHLORIDE 150 MG/1
CAPSULE, EXTENDED RELEASE ORAL
Qty: 60 CAPSULE | Refills: 1 | Status: SHIPPED | OUTPATIENT
Start: 2022-03-03 | End: 2022-06-06

## 2022-03-03 RX ORDER — GABAPENTIN 300 MG/1
CAPSULE ORAL
Qty: 90 CAPSULE | Refills: 0 | Status: SHIPPED | OUTPATIENT
Start: 2022-03-03 | End: 2022-04-01

## 2022-03-07 DIAGNOSIS — R60.0 LOCALIZED EDEMA: ICD-10-CM

## 2022-03-07 RX ORDER — FUROSEMIDE 40 MG/1
TABLET ORAL
Qty: 30 TABLET | Refills: 1 | OUTPATIENT
Start: 2022-03-07

## 2022-03-08 ENCOUNTER — TELEPHONE (OUTPATIENT)
Dept: INTERNAL MEDICINE | Facility: CLINIC | Age: 62
End: 2022-03-08

## 2022-03-08 NOTE — TELEPHONE ENCOUNTER
CHERELLE BOO Geiger: [id number] - PA Case ID: [id number] - Rx #: [id number] help? Call us at [phone]  Status  Sent to [facility]  Drug  FreeStyle Natalie 14 Day Sensor  Form  [employer] Medicare Part D Electronic Prior Authorization Form (2017 NCPDP)  Original Claim Info

## 2022-03-08 NOTE — TELEPHONE ENCOUNTER
Patient called and stated that her pharmacy said a PA is required for Continuous Blood Gluc Sensor (Dexcom G6 Sensor)    She said she has to change hers in 2 days.     Please advise.

## 2022-03-10 ENCOUNTER — TELEPHONE (OUTPATIENT)
Dept: INTERNAL MEDICINE | Facility: CLINIC | Age: 62
End: 2022-03-10

## 2022-03-10 NOTE — TELEPHONE ENCOUNTER
Pt called to check status of her Continuous Blood Gluc Sensor(Dexcom G6 Sensor). She stated that she has a new insurance. I have updated it in her chart. She would like for someone to call her back about the status. Please advise.

## 2022-03-14 DIAGNOSIS — G89.29 CHRONIC LEFT-SIDED LOW BACK PAIN WITH LEFT-SIDED SCIATICA: ICD-10-CM

## 2022-03-14 DIAGNOSIS — M54.42 CHRONIC LEFT-SIDED LOW BACK PAIN WITH LEFT-SIDED SCIATICA: ICD-10-CM

## 2022-03-14 DIAGNOSIS — Z79.4 TYPE 2 DIABETES MELLITUS WITH DIABETIC NEUROPATHY, WITH LONG-TERM CURRENT USE OF INSULIN: ICD-10-CM

## 2022-03-14 DIAGNOSIS — G47.00 INSOMNIA, UNSPECIFIED TYPE: ICD-10-CM

## 2022-03-14 DIAGNOSIS — E11.40 TYPE 2 DIABETES MELLITUS WITH DIABETIC NEUROPATHY, WITH LONG-TERM CURRENT USE OF INSULIN: ICD-10-CM

## 2022-03-14 NOTE — TELEPHONE ENCOUNTER
Pt needs refill on pain meds and sleeping aid  Controlled Substance Refill Request:     Medications needing Refilled: Oxycodone    Ph[facility]acy: [facility]     Last Office Visit: 02/15/2022   Next Office Visit: 05/16/2022    UDS:  Last Urine Drug Screen: 02/01/2022  Result: Positive for oxycodone    Next Urine Drug Screen Scheduled: 05/16/2022      Controlled Substance Agreement: up to date

## 2022-03-15 RX ORDER — OXYCODONE AND ACETAMINOPHEN 10; 325 MG/1; MG/1
1 TABLET ORAL EVERY 6 HOURS PRN
Qty: 60 TABLET | Refills: 0 | Status: SHIPPED | OUTPATIENT
Start: 2022-03-15 | End: 2022-04-12 | Stop reason: SDUPTHER

## 2022-03-15 RX ORDER — TEMAZEPAM 22.5 MG/1
22.5 CAPSULE ORAL NIGHTLY PRN
Qty: 30 CAPSULE | Refills: 0 | Status: SHIPPED | OUTPATIENT
Start: 2022-03-15

## 2022-03-15 RX ORDER — PROCHLORPERAZINE 25 MG/1
SUPPOSITORY RECTAL
Qty: 3 EACH | Refills: 5 | Status: SHIPPED | OUTPATIENT
Start: 2022-03-15

## 2022-03-15 RX ORDER — BLOOD-GLUCOSE,RECEIVER,CONT
1 EACH MISCELLANEOUS DAILY
Qty: 1 EACH | Refills: 0 | Status: SHIPPED | OUTPATIENT
Start: 2022-03-15

## 2022-03-31 DIAGNOSIS — M54.42 CHRONIC LOW BACK PAIN WITH LEFT-SIDED SCIATICA, UNSPECIFIED BACK PAIN LATERALITY: ICD-10-CM

## 2022-03-31 DIAGNOSIS — G89.29 CHRONIC LOW BACK PAIN WITH LEFT-SIDED SCIATICA, UNSPECIFIED BACK PAIN LATERALITY: ICD-10-CM

## 2022-04-01 RX ORDER — GABAPENTIN 300 MG/1
CAPSULE ORAL
Qty: 90 CAPSULE | Refills: 0 | Status: SHIPPED | OUTPATIENT
Start: 2022-04-01 | End: 2022-05-02 | Stop reason: SDUPTHER

## 2022-04-01 NOTE — TELEPHONE ENCOUNTER
Rx Refill Note  Requested Prescriptions     Pending Prescriptions Disp Refills   • gabapentin (NEURONTIN) 300 MG capsule [Pharmacy Med Name: GABAPENTIN 300 MG CAPS 300 Capsule] 90 capsule 0     Sig: TAKE ONE CAPSULE BY MOUTH THREE TIMES DAILY      Last office visit with prescribing clinician: 2/15/2022      Next office visit with prescribing clinician: 5/16/2022     ToxASSURE Select 13 (MW) - Urine, Clean Catch (02/01/2022 13:00)               Rivka Sheppard RN  04/01/22, 08:44 CDT

## 2022-04-12 DIAGNOSIS — M54.42 CHRONIC LEFT-SIDED LOW BACK PAIN WITH LEFT-SIDED SCIATICA: ICD-10-CM

## 2022-04-12 DIAGNOSIS — G89.29 CHRONIC LEFT-SIDED LOW BACK PAIN WITH LEFT-SIDED SCIATICA: ICD-10-CM

## 2022-04-12 RX ORDER — OXYCODONE AND ACETAMINOPHEN 10; 325 MG/1; MG/1
1 TABLET ORAL EVERY 6 HOURS PRN
Qty: 60 TABLET | Refills: 0 | Status: SHIPPED | OUTPATIENT
Start: 2022-04-12 | End: 2022-05-10 | Stop reason: SDUPTHER

## 2022-04-12 NOTE — TELEPHONE ENCOUNTER
Controlled Substance Refill Request:     Medications needing Refilled: Oxycodone    Pharmacy: [facility]    Last Office Visit: 02/15/2022  Next Office Visit: 05/16/2022    UDS:  Last Urine Drug Screen: 02/01/2022   Result: positive     Next Urine Drug Screen Scheduled:   05/16/2022      Controlled Substance Agreement: not up to date

## 2022-04-13 DIAGNOSIS — R60.0 LOCALIZED EDEMA: ICD-10-CM

## 2022-04-13 RX ORDER — FUROSEMIDE 40 MG/1
TABLET ORAL
Qty: 30 TABLET | Refills: 1 | Status: SHIPPED | OUTPATIENT
Start: 2022-04-13 | End: 2022-06-06

## 2022-04-13 NOTE — TELEPHONE ENCOUNTER
Patient wanted to add the following med for refill: Restoril-due tomorrow. [facility] pharmacy.

## 2022-04-13 NOTE — TELEPHONE ENCOUNTER
Rx Refill Note  Requested Prescriptions     Pending Prescriptions Disp Refills   • furosemide (LASIX) 40 MG tablet [Pharmacy Med Name: FUROSEMIDE 40 MG TAB 40 Tablet] 30 tablet 1     Sig: TAKE ONE TABLET BY MOUTH DAILY.      Last office visit with prescribing clinician: 2/15/2022      Next office visit with prescribing clinician: 5/16/2022            Anne-Marie Jj MA  04/13/22, 09:49 CDT

## 2022-04-25 ENCOUNTER — PRIOR AUTHORIZATION (OUTPATIENT)
Dept: FAMILY MEDICINE CLINIC | Facility: CLINIC | Age: 62
End: 2022-04-25

## 2022-05-02 DIAGNOSIS — G89.29 CHRONIC LOW BACK PAIN WITH LEFT-SIDED SCIATICA, UNSPECIFIED BACK PAIN LATERALITY: ICD-10-CM

## 2022-05-02 DIAGNOSIS — M54.42 CHRONIC LOW BACK PAIN WITH LEFT-SIDED SCIATICA, UNSPECIFIED BACK PAIN LATERALITY: ICD-10-CM

## 2022-05-02 RX ORDER — GABAPENTIN 300 MG/1
300 CAPSULE ORAL 3 TIMES DAILY
Qty: 90 CAPSULE | Refills: 0 | Status: SHIPPED | OUTPATIENT
Start: 2022-05-02 | End: 2022-06-10 | Stop reason: SDUPTHER

## 2022-05-03 ENCOUNTER — PRIOR AUTHORIZATION (OUTPATIENT)
Dept: INTERNAL MEDICINE | Facility: CLINIC | Age: 62
End: 2022-05-03

## 2022-05-03 NOTE — TELEPHONE ENCOUNTER
Request Reference Number: [id number]. XULTOPHY /3.6 is approved through 12/31/2022. Your patient may now fill this prescription and it will be covered.

## 2022-05-10 DIAGNOSIS — M54.42 CHRONIC LEFT-SIDED LOW BACK PAIN WITH LEFT-SIDED SCIATICA: ICD-10-CM

## 2022-05-10 DIAGNOSIS — G89.29 CHRONIC LEFT-SIDED LOW BACK PAIN WITH LEFT-SIDED SCIATICA: ICD-10-CM

## 2022-05-10 RX ORDER — OXYCODONE AND ACETAMINOPHEN 10; 325 MG/1; MG/1
1 TABLET ORAL EVERY 6 HOURS PRN
Qty: 60 TABLET | Refills: 0 | Status: SHIPPED | OUTPATIENT
Start: 2022-05-10 | End: 2022-06-10 | Stop reason: SDUPTHER

## 2022-05-10 NOTE — TELEPHONE ENCOUNTER
PT needs refills on following.  PT states two remaining. Call into [facility] pharmacy      oxyCODONE-acetaminophen (Percocet)  MG per tablet [46416] (Order [id number])

## 2022-05-10 NOTE — TELEPHONE ENCOUNTER
Rx Refill Note  Requested Prescriptions     Pending Prescriptions Disp Refills   • oxyCODONE-acetaminophen (Percocet)  MG per tablet 60 tablet 0     Sig: Take 1 tablet by mouth Every 6 (Six) Hours As Needed for Moderate Pain .      Last office visit with prescribing clinician: 2/15/2022      Next office visit with prescribing clinician: 5/23/2022     ToxASSURE Select 13 (MW) - Urine, Clean Catch (02/01/2022 13:00)  Needs updated UDS       Anne-Marie Jj MA  05/10/22, 10:30 CDT

## 2022-06-04 DIAGNOSIS — R60.0 LOCALIZED EDEMA: ICD-10-CM

## 2022-06-04 DIAGNOSIS — F33.1 MODERATE EPISODE OF RECURRENT MAJOR DEPRESSIVE DISORDER: ICD-10-CM

## 2022-06-04 DIAGNOSIS — J44.1 COPD WITH EXACERBATION: ICD-10-CM

## 2022-06-04 DIAGNOSIS — B02.9 HERPES ZOSTER WITHOUT COMPLICATION: ICD-10-CM

## 2022-06-06 RX ORDER — VENLAFAXINE HYDROCHLORIDE 150 MG/1
CAPSULE, EXTENDED RELEASE ORAL
Qty: 60 CAPSULE | Refills: 1 | Status: SHIPPED | OUTPATIENT
Start: 2022-06-06

## 2022-06-06 RX ORDER — LIDOCAINE 50 MG/G
1 PATCH TOPICAL EVERY 24 HOURS
Qty: 30 PATCH | Refills: 1 | Status: SHIPPED | OUTPATIENT
Start: 2022-06-06

## 2022-06-06 RX ORDER — CLONIDINE HYDROCHLORIDE 0.1 MG/1
TABLET ORAL
Qty: 60 TABLET | Refills: 1 | Status: SHIPPED | OUTPATIENT
Start: 2022-06-06

## 2022-06-06 RX ORDER — LISINOPRIL 10 MG/1
TABLET ORAL
Qty: 30 TABLET | Refills: 3 | Status: SHIPPED | OUTPATIENT
Start: 2022-06-06

## 2022-06-06 RX ORDER — ALBUTEROL SULFATE 90 UG/1
1 AEROSOL, METERED RESPIRATORY (INHALATION) EVERY 4 HOURS PRN
Qty: 18 G | Refills: 5 | Status: SHIPPED | OUTPATIENT
Start: 2022-06-06

## 2022-06-06 RX ORDER — FUROSEMIDE 40 MG/1
TABLET ORAL
Qty: 30 TABLET | Refills: 1 | Status: SHIPPED | OUTPATIENT
Start: 2022-06-06

## 2022-06-06 NOTE — TELEPHONE ENCOUNTER
DR. CAN PATIENT     Rx Refill Note  Requested Prescriptions     Pending Prescriptions Disp Refills   • lisinopril (PRINIVIL,ZESTRIL) 10 MG tablet [Pharmacy Med Name: LISINOPRIL 10 MG TAB 10 Tablet] 30 tablet 3     Sig: TAKE ONE TABLET BY MOUTH DAILY   • furosemide (LASIX) 40 MG tablet [Pharmacy Med Name: FUROSEMIDE 40 MG TAB 40 Tablet] 30 tablet 1     Sig: TAKE ONE TABLET BY MOUTH DAILY.   • cloNIDine (CATAPRES) 0.1 MG tablet [Pharmacy Med Name: CLONIDINE HCL 0.1 MG TAB 0.1 Tablet] 60 tablet 1     Sig: TAKE ONE TABLET BY MOUTH TWICE A DAY   • venlafaxine XR (EFFEXOR-XR) 150 MG 24 hr capsule [Pharmacy Med Name: VENLAFAXINE HCL  MG C 150 Capsule] 60 capsule 1     Sig: TAKE TWO CAPSULES BY MOUTH DAILY   • lidocaine (LIDODERM) 5 % [Pharmacy Med Name: LIDOCAINE 5 % PTCH 5 Patch] 30 patch 1     Sig: PLACE 1 PATCH ON THE SKIN AS DIRECTED BY PROVIDER DAILY. REMOVE & DISCARD PATCH WITHIN 12 HOURS OR AS DIRECTED BY MD   • Ventolin  (90 Base) MCG/ACT inhaler [Pharmacy Med Name: VENTOLIN HFA 90 MCG INHALER 108 (90 BAS Aerosol] 18 g 5     Sig: INHALE 1 PUFF EVERY 4 (FOUR) HOURS AS NEEDED FOR WHEEZING.      Last office visit with prescribing clinician: 2/15/2022      Next office visit with prescribing clinician: 6/23/2022            Rivka Sheppard RN  06/06/22, 07:56 CDT

## 2022-06-08 ENCOUNTER — PRIOR AUTHORIZATION (OUTPATIENT)
Dept: INTERNAL MEDICINE | Facility: CLINIC | Age: 62
End: 2022-06-08

## 2022-06-08 NOTE — TELEPHONE ENCOUNTER
CHERELLE EMA Key: [id number] - PA Case ID: [id number] - Rx #: [id number] help? Call us at [phone]  Status  Additional Information Required  Drug  Lidocaine 5% patches  Form  [employer] Medicare Part D Electronic Prior Authorization Form (2017 NCPDP)  Original Claim Info  569 Provide Notice: Medicare Prescription Drug Coverage and Your Rights

## 2022-06-10 DIAGNOSIS — M54.42 CHRONIC LEFT-SIDED LOW BACK PAIN WITH LEFT-SIDED SCIATICA: ICD-10-CM

## 2022-06-10 DIAGNOSIS — G89.29 CHRONIC LOW BACK PAIN WITH LEFT-SIDED SCIATICA, UNSPECIFIED BACK PAIN LATERALITY: ICD-10-CM

## 2022-06-10 DIAGNOSIS — M54.42 CHRONIC LOW BACK PAIN WITH LEFT-SIDED SCIATICA, UNSPECIFIED BACK PAIN LATERALITY: ICD-10-CM

## 2022-06-10 DIAGNOSIS — G89.29 CHRONIC LEFT-SIDED LOW BACK PAIN WITH LEFT-SIDED SCIATICA: ICD-10-CM

## 2022-06-10 RX ORDER — GABAPENTIN 300 MG/1
300 CAPSULE ORAL 3 TIMES DAILY
Qty: 90 CAPSULE | Refills: 0 | Status: SHIPPED | OUTPATIENT
Start: 2022-06-10

## 2022-06-10 RX ORDER — OXYCODONE AND ACETAMINOPHEN 10; 325 MG/1; MG/1
1 TABLET ORAL EVERY 6 HOURS PRN
Qty: 12 TABLET | Refills: 0 | Status: SHIPPED | OUTPATIENT
Start: 2022-06-10 | End: 2022-06-14 | Stop reason: SDUPTHER

## 2022-06-10 RX ORDER — OXYCODONE AND ACETAMINOPHEN 10; 325 MG/1; MG/1
TABLET ORAL
Qty: 60 TABLET | Refills: 0 | OUTPATIENT
Start: 2022-06-10

## 2022-06-10 RX ORDER — GABAPENTIN 300 MG/1
CAPSULE ORAL
Qty: 90 CAPSULE | Refills: 0 | OUTPATIENT
Start: 2022-06-10

## 2022-06-14 ENCOUNTER — LAB (OUTPATIENT)
Dept: INTERNAL MEDICINE | Facility: CLINIC | Age: 62
End: 2022-06-14

## 2022-06-14 DIAGNOSIS — G89.29 CHRONIC LEFT-SIDED LOW BACK PAIN WITH LEFT-SIDED SCIATICA: ICD-10-CM

## 2022-06-14 DIAGNOSIS — M54.42 CHRONIC LEFT-SIDED LOW BACK PAIN WITH LEFT-SIDED SCIATICA: ICD-10-CM

## 2022-06-14 DIAGNOSIS — Z79.899 ENCOUNTER FOR LONG-TERM (CURRENT) USE OF OTHER MEDICATIONS: Primary | ICD-10-CM

## 2022-06-14 NOTE — TELEPHONE ENCOUNTER
Jaimie filled 3 days     Rx Refill Note  Requested Prescriptions     Pending Prescriptions Disp Refills   • oxyCODONE-acetaminophen (Percocet)  MG per tablet 48 tablet 0     Sig: Take 1 tablet by mouth Every 6 (Six) Hours As Needed for Moderate Pain .   Med last filled: 6/10/22 with 3 days worth   Last office visit with prescribing clinician:    2/15/22  Next office visit with prescribing clinician:  6/23/22    Compliance Drug Analysis, Ur - Urine, Clean Catch (06/14/2022 08:34)               Rivka Sheppard RN  06/14/22, 12:56 CDT

## 2022-06-14 NOTE — TELEPHONE ENCOUNTER
PT called requesting refills of following.    oxyCODONE-acetaminophen (Percocet)  MG per tablet [47816] (Order [id number])    PT states been out of following for a week.  & not sure that it will show up in her UDS she had yesterday.  gabapentin (NEURONTIN) 300 MG capsule [[id number]] (Order [id number])

## 2022-06-14 NOTE — TELEPHONE ENCOUNTER
Incoming Refill Request      Medication requested (name and dose): oxyCODONE-acetaminophen (Percocet)  MG per tablet     Pharmacy where request should be sent: [facility]    Additional details provided by patient: Pt was prescribed 3 days worth by RICK Hyatt.     Best call back number: [phone]     Does the patient have less than a 3 day supply:    [x] Yes  [] No    Kathleen Rouse Rep  06/14/22, 12:50 CDT

## 2022-06-16 RX ORDER — OXYCODONE AND ACETAMINOPHEN 10; 325 MG/1; MG/1
1 TABLET ORAL EVERY 6 HOURS PRN
Qty: 48 TABLET | Refills: 0 | Status: SHIPPED | OUTPATIENT
Start: 2022-06-16

## 2022-06-22 LAB
DRUGS UR: NORMAL
Lab: NORMAL

## 2022-06-24 NOTE — PROGRESS NOTES
Called pt and let her know medication would have to be present on next UDS or medication could no longer be written. Pt voiced understanding.

## 2022-06-27 DIAGNOSIS — K21.9 GASTROESOPHAGEAL REFLUX DISEASE WITHOUT ESOPHAGITIS: ICD-10-CM

## 2022-06-27 NOTE — TELEPHONE ENCOUNTER
Rx Refill Note  Requested Prescriptions     Pending Prescriptions Disp Refills   • omeprazole (priLOSEC) 40 MG capsule [Pharmacy Med Name: OMEPRAZOLE 40 MG CAP 40 Capsule] 90 capsule 1     Sig: TAKE 1 CAPSULE BY MOUTH DAILY.      Last office visit with prescribing clinician: 2/15/2022      Next office visit with prescribing clinician: Visit date not found            Rivka Sheppard RN  06/27/22, 09:35 CDT

## 2022-06-28 RX ORDER — OMEPRAZOLE 40 MG/1
40 CAPSULE, DELAYED RELEASE ORAL DAILY
Qty: 90 CAPSULE | Refills: 1 | Status: SHIPPED | OUTPATIENT
Start: 2022-06-28

## 2022-07-01 ENCOUNTER — TELEPHONE (OUTPATIENT)
Dept: INTERNAL MEDICINE | Facility: CLINIC | Age: 62
End: 2022-07-01

## 2022-07-01 DIAGNOSIS — M54.42 CHRONIC LEFT-SIDED LOW BACK PAIN WITH LEFT-SIDED SCIATICA: Primary | ICD-10-CM

## 2022-07-01 DIAGNOSIS — G89.29 CHRONIC LEFT-SIDED LOW BACK PAIN WITH LEFT-SIDED SCIATICA: Primary | ICD-10-CM

## 2022-07-01 NOTE — TELEPHONE ENCOUNTER
Received call from patient asking about her recent letter of dismissal. Patient did not understand why. I explained that it was due to frequent no-shows and breach of controlled substance contract.     Patient questioned the no-shows but I explained in depth each one.     I encouraged patient to contact her insurance company to find a new provider.     Patient requested pain management referral to Dr. Blackman - pended for Dr. Chaudhari's review.

## 2022-07-11 ENCOUNTER — TELEPHONE (OUTPATIENT)
Dept: INTERNAL MEDICINE | Facility: CLINIC | Age: 62
End: 2022-07-11

## 2022-07-11 NOTE — TELEPHONE ENCOUNTER
Called patient.. she did not inform me if she was taking the medication. She did bring up she received the letter. She wanted to talk to dr. Chaudhari but she was not in. And we let her know that when she finds a new doctor we will fax her records.

## 2022-07-11 NOTE — TELEPHONE ENCOUNTER
Caller: [facility] -VIRGIL    Relationship: MEDICATION ADHERENCE TECHNICIAN    Best call back number: [phone]      What was the call regarding: VIRGIL STATES HE WANTED TO INFORM THE OFFICE THE PATIENT HAS NOT HAD HER LISINOPRIL FOR 8 DAYS (ACCORDING TO THE PHARMACY) VIRGIL STATES HE ATTEMPTED TO REACH OUT TO THE PATIENT AND WAS UNSUCCESSFUL.     Do you require a callback: NO

## 2022-08-07 NOTE — TELEPHONE ENCOUNTER
Caller: Nora Carney    Relationship: Self    Best call back number: [phone]    What is the best time to reach you:     Who are you requesting to speak with (clinical staff, provider,  specific staff member): NURSE    Do you know the name of the person who called:     What was the call regarding: REFILL OF KLONIPINE    Do you require a callback: YES           Group Therapy Note    Date: 8/7/2022    Group Start Time: 1110  Group End Time: 8041  Group Topic: Recreational    [facility]  [place], [facility]        Group Therapy Note    Attendees: 4       Notes: Active and engaged during leisure activity. Pleasant and social enjoying trivia questions. Status After Intervention:  Improved    Participation Level:  Active Listener and Interactive    Participation Quality: Appropriate, Attentive, Sharing, and Supportive      Speech:  normal      Thought Process/Content: Logical      Affective Functioning: Congruent      Mood: euthymic      Level of consciousness:  Alert, Oriented x4, and Attentive      Response to Learning: Capable of insight, Able to change behavior, and Progressing to goal      Endings: None Reported    Modes of Intervention: Support, Socialization, Exploration, and Activity      Discipline Responsible: Psychoeducational Specialist      Signature:  Gokul Ly Fluker

## 2022-08-23 ENCOUNTER — OFFICE VISIT (OUTPATIENT)
Dept: PRIMARY CARE CLINIC | Age: 62
End: 2022-08-23
Payer: MEDICARE

## 2022-08-23 VITALS
HEIGHT: 65 IN | HEART RATE: 86 BPM | BODY MASS INDEX: 34.7 KG/M2 | WEIGHT: 208.25 LBS | DIASTOLIC BLOOD PRESSURE: 72 MMHG | OXYGEN SATURATION: 93 % | TEMPERATURE: 95.4 F | SYSTOLIC BLOOD PRESSURE: 124 MMHG

## 2022-08-23 DIAGNOSIS — Z11.59 ENCOUNTER FOR HEPATITIS C SCREENING TEST FOR LOW RISK PATIENT: ICD-10-CM

## 2022-08-23 DIAGNOSIS — Z11.4 SCREENING FOR HIV (HUMAN IMMUNODEFICIENCY VIRUS): Primary | ICD-10-CM

## 2022-08-23 DIAGNOSIS — I10 PRIMARY HYPERTENSION: ICD-10-CM

## 2022-08-23 DIAGNOSIS — M15.9 PRIMARY OSTEOARTHRITIS INVOLVING MULTIPLE JOINTS: ICD-10-CM

## 2022-08-23 DIAGNOSIS — F41.9 ANXIETY AND DEPRESSION: ICD-10-CM

## 2022-08-23 DIAGNOSIS — I25.10 CORONARY ARTERY DISEASE INVOLVING NATIVE CORONARY ARTERY OF NATIVE HEART WITHOUT ANGINA PECTORIS: ICD-10-CM

## 2022-08-23 DIAGNOSIS — F32.A ANXIETY AND DEPRESSION: ICD-10-CM

## 2022-08-23 DIAGNOSIS — F17.219 CIGARETTE NICOTINE DEPENDENCE WITH NICOTINE-INDUCED DISORDER: ICD-10-CM

## 2022-08-23 DIAGNOSIS — E78.2 MIXED HYPERLIPIDEMIA: ICD-10-CM

## 2022-08-23 DIAGNOSIS — J44.9 CHRONIC OBSTRUCTIVE PULMONARY DISEASE, UNSPECIFIED COPD TYPE (HCC): ICD-10-CM

## 2022-08-23 DIAGNOSIS — R09.02 HYPOXIA: ICD-10-CM

## 2022-08-23 DIAGNOSIS — Z79.4 TYPE 2 DIABETES MELLITUS WITHOUT COMPLICATION, WITH LONG-TERM CURRENT USE OF INSULIN (HCC): ICD-10-CM

## 2022-08-23 DIAGNOSIS — K21.9 GASTROESOPHAGEAL REFLUX DISEASE, UNSPECIFIED WHETHER ESOPHAGITIS PRESENT: ICD-10-CM

## 2022-08-23 DIAGNOSIS — R53.83 FATIGUE, UNSPECIFIED TYPE: ICD-10-CM

## 2022-08-23 DIAGNOSIS — I73.9 CLAUDICATION (HCC): ICD-10-CM

## 2022-08-23 DIAGNOSIS — G89.29 CHRONIC MIDLINE LOW BACK PAIN WITHOUT SCIATICA: ICD-10-CM

## 2022-08-23 DIAGNOSIS — E11.42 DIABETIC POLYNEUROPATHY ASSOCIATED WITH TYPE 2 DIABETES MELLITUS (HCC): ICD-10-CM

## 2022-08-23 DIAGNOSIS — M54.50 CHRONIC MIDLINE LOW BACK PAIN WITHOUT SCIATICA: ICD-10-CM

## 2022-08-23 DIAGNOSIS — E11.9 TYPE 2 DIABETES MELLITUS WITHOUT COMPLICATION, WITH LONG-TERM CURRENT USE OF INSULIN (HCC): ICD-10-CM

## 2022-08-23 PROCEDURE — 99406 BEHAV CHNG SMOKING 3-10 MIN: CPT | Performed by: PEDIATRICS

## 2022-08-23 PROCEDURE — 2022F DILAT RTA XM EVC RTNOPTHY: CPT | Performed by: PEDIATRICS

## 2022-08-23 PROCEDURE — 4004F PT TOBACCO SCREEN RCVD TLK: CPT | Performed by: PEDIATRICS

## 2022-08-23 PROCEDURE — 3017F COLORECTAL CA SCREEN DOC REV: CPT | Performed by: PEDIATRICS

## 2022-08-23 PROCEDURE — 3023F SPIROM DOC REV: CPT | Performed by: PEDIATRICS

## 2022-08-23 PROCEDURE — G8417 CALC BMI ABV UP PARAM F/U: HCPCS | Performed by: PEDIATRICS

## 2022-08-23 PROCEDURE — 3046F HEMOGLOBIN A1C LEVEL >9.0%: CPT | Performed by: PEDIATRICS

## 2022-08-23 PROCEDURE — 99205 OFFICE O/P NEW HI 60 MIN: CPT | Performed by: PEDIATRICS

## 2022-08-23 PROCEDURE — G8427 DOCREV CUR MEDS BY ELIG CLIN: HCPCS | Performed by: PEDIATRICS

## 2022-08-23 RX ORDER — GABAPENTIN 300 MG/1
300 CAPSULE ORAL 3 TIMES DAILY
Qty: 90 CAPSULE | Refills: 5 | Status: SHIPPED | OUTPATIENT
Start: 2022-08-23 | End: 2022-09-22

## 2022-08-23 RX ORDER — VENLAFAXINE HYDROCHLORIDE 75 MG/1
225 CAPSULE, EXTENDED RELEASE ORAL DAILY
Qty: 90 CAPSULE | Refills: 11 | Status: SHIPPED | OUTPATIENT
Start: 2022-08-23

## 2022-08-23 RX ORDER — ROSUVASTATIN CALCIUM 20 MG/1
20 TABLET, COATED ORAL DAILY
Qty: 30 TABLET | Refills: 11 | Status: SHIPPED | OUTPATIENT
Start: 2022-08-23 | End: 2022-10-07

## 2022-08-23 RX ORDER — GLIPIZIDE 5 MG/1
5 TABLET ORAL
Qty: 180 TABLET | Refills: 1 | Status: CANCELLED | OUTPATIENT
Start: 2022-08-23

## 2022-08-23 RX ORDER — METOPROLOL SUCCINATE 25 MG/1
25 TABLET, EXTENDED RELEASE ORAL DAILY
Qty: 90 TABLET | Refills: 3 | Status: SHIPPED | OUTPATIENT
Start: 2022-08-23

## 2022-08-23 RX ORDER — SEMAGLUTIDE 1.34 MG/ML
0.25 INJECTION, SOLUTION SUBCUTANEOUS WEEKLY
Qty: 1 PEN | Refills: 11 | Status: SHIPPED | OUTPATIENT
Start: 2022-08-23

## 2022-08-23 RX ORDER — GABAPENTIN 300 MG/1
300 CAPSULE ORAL 3 TIMES DAILY
COMMUNITY
Start: 2022-06-10 | End: 2022-08-23 | Stop reason: SDUPTHER

## 2022-08-23 RX ORDER — OMEPRAZOLE 40 MG/1
40 CAPSULE, DELAYED RELEASE ORAL DAILY
Qty: 90 CAPSULE | Refills: 3 | Status: SHIPPED | OUTPATIENT
Start: 2022-08-23

## 2022-08-23 RX ORDER — ALBUTEROL SULFATE 90 UG/1
2 POWDER, METERED RESPIRATORY (INHALATION) EVERY 6 HOURS PRN
Qty: 3 EACH | Refills: 3 | Status: SHIPPED | OUTPATIENT
Start: 2022-08-23

## 2022-08-23 SDOH — ECONOMIC STABILITY: FOOD INSECURITY: WITHIN THE PAST 12 MONTHS, THE FOOD YOU BOUGHT JUST DIDN'T LAST AND YOU DIDN'T HAVE MONEY TO GET MORE.: NEVER TRUE

## 2022-08-23 SDOH — ECONOMIC STABILITY: FOOD INSECURITY: WITHIN THE PAST 12 MONTHS, YOU WORRIED THAT YOUR FOOD WOULD RUN OUT BEFORE YOU GOT MONEY TO BUY MORE.: NEVER TRUE

## 2022-08-23 ASSESSMENT — PATIENT HEALTH QUESTIONNAIRE - PHQ9
2. FEELING DOWN, DEPRESSED OR HOPELESS: 0
SUM OF ALL RESPONSES TO PHQ9 QUESTIONS 1 & 2: 0
SUM OF ALL RESPONSES TO PHQ QUESTIONS 1-9: 0
1. LITTLE INTEREST OR PLEASURE IN DOING THINGS: 0
SUM OF ALL RESPONSES TO PHQ QUESTIONS 1-9: 0

## 2022-08-23 ASSESSMENT — SOCIAL DETERMINANTS OF HEALTH (SDOH): HOW HARD IS IT FOR YOU TO PAY FOR THE VERY BASICS LIKE FOOD, HOUSING, MEDICAL CARE, AND HEATING?: SOMEWHAT HARD

## 2022-08-23 NOTE — PROGRESS NOTES
[address] [facility], [address]  Phone [phone]   Fax [phone]      OFFICE VISIT: 2022    Jerzy Collins-: 1960      HPI  Reason For Visit:  Alan Hicks is a 63 y.o. [facility] (Former pt of Dr Puma Schaefer. Was released for \"No Shows\". She doesn't drive and at times her ride won't show up. ), Health Maintenance (Diabetic Eye Exam, Mammogram, Cervical Cancer Screen, Diabetic Foot Exam, Shingles Shot, Pneumonia shot, Diabetic Foot Exam), and Discuss Medications (Pt has stopped taking several of her medications due to insurance and not having a dr. )    Patient presents to establish care. She has been to multiple local providers but has been discharged due to noncompliance or no-shows. States that she has difficulty getting to her appointments at times because she does not drive and has to have a ride. She has stopped the majority of her medications due to insurance reasons and not having a primary care physician. Most recent primary care physician was Dr. Mary Key at [facility].    She is wanting to get her Rx again. She has a lot of low back pain. She also has pain all over. She states that she was on oxycodone in the past   She also took gabapentin. She is out of these medications. She states that she cannot take cymbalta. She asked for pain medications multiple times. Diabetes mellitus type 2:  Medication:   Glipizide 5 mg twice daily   She reports that she was on insulin (Xultophy 15 units) but she stopped taking this   She states that she did not tolerate metformin.   Most recent hemoglobin A1c was 8.3 on 11/3/2021      Hypertension:   BP today was   BP Readings from Last 1 Encounters:   22 124/72      Recent BP readings:    BP Readings from Last 3 Encounters:   22 124/72   20 136/70   20 136/70     Medication   Clonidine 0.1 mg twice daily  Medication compliance:  noncompliant: most of the time  Home blood pressure monitoring: No.    She is not adherent to a low sodium diet. Symptoms: none  Laboratory:  Lab Results   Component Value Date    BUN 9 06/16/2020    CREATININE 0.7 06/16/2020       Hyperlipidemia:   Medication:   OTC Fish Oil  Low Fat, Low Choleterol Diet:  no  Myalgias or GI upset: no  The patient exercises rarely. Laboratory:    Lab Results   Component Value Date    CHOL 221 (H) 06/16/2020    TRIG 169 (H) 06/16/2020    HDL 51 (L) 06/16/2020    LDLCALC 136 06/16/2020      Lab Results   Component Value Date    ALT 8 06/16/2020    AST 10 06/16/2020       Coronary Artery Disease:  Medications:   Beta-blockade: None   RAAS Therapy: None   Lipid Management: None   Platelet Inhibition: None   Anti-anginal:  None  Symptoms: no anginal symptoms, but she is short of breath with minimal exertion. Nitroglycerin use: none  Cardiology follow-up: none  Additional studies: none      COPD:  Medication:   Ventolin HFA 2 puffs every 4 hours as needed   She is on oxygen at night and some during the day. Symptoms:she is requesting a portable oxygen concentrator via [employer]. GERD:  Medication:   Omeprazole 40 mg daily  Symptoms:controlled      Major depressive disorder:  Medication:   Effexor  mg daily (2 x 150 mg)  Symptoms:we discussed that this is over the max dose. We can rx 225 mg max. height is 5' 4.5\" (1.638 m) and weight is 208 lb 4 oz (94.5 kg). Her temporal temperature is 95.4 °F (35.2 °C) (abnormal). Her blood pressure is 124/72 and her pulse is 86. Her oxygen saturation is 93%. Body mass index is 35.19 kg/m². I have reviewed the following with the Ms. Collins   Lab Review  No visits with results within 6 Month(s) from this visit.    Latest known visit with results is:   Orders Only on 06/16/2020   Component Date Value    TSH 06/16/2020 1.450     Microalbumin, Random Uri* 06/16/2020 1.40     Creatinine, Ur 06/16/2020 159.2     Microalbumin Creatinine * 06/16/2020 8.8     Cholesterol, Total 06/16/2020 221 (A)    Triglycerides 06/16/2020 169 (A)    HDL 06/16/2020 51 (A)    LDL Calculated 06/16/2020 136     Hemoglobin A1C 06/16/2020 8.7 (A)    Sodium 06/16/2020 134 (A)    Potassium 06/16/2020 3.4 (A)    Chloride 06/16/2020 92 (A)    CO2 06/16/2020 29     Anion Gap 06/16/2020 13     Glucose 06/16/2020 352 (A)    BUN 06/16/2020 9     Creatinine 06/16/2020 0.7     GFR Non- 06/16/2020 >60     Calcium 06/16/2020 8.5 (A)    Total Protein 06/16/2020 7.0     Albumin 06/16/2020 3.9     Total Bilirubin 06/16/2020 <0.2     Alkaline Phosphatase 06/16/2020 76     ALT 06/16/2020 8     AST 06/16/2020 10     WBC 06/16/2020 10.5     RBC 06/16/2020 4.74     Hemoglobin 06/16/2020 12.8     Hematocrit 06/16/2020 39.7     MCV 06/16/2020 83.8     MCH 06/16/2020 27.0     MCHC 06/16/2020 32.2 (A)    RDW 06/16/2020 13.2     Platelets 67/33/3962 281     MPV 06/16/2020 11.0     Neutrophils % 06/16/2020 63.6     Lymphocytes % 06/16/2020 28.2     Monocytes % 06/16/2020 7.0     Eosinophils % 06/16/2020 0.0     Basophils % 06/16/2020 0.6     Neutrophils Absolute 06/16/2020 6.7     Immature Granulocytes # 06/16/2020 0.1     Lymphocytes Absolute 06/16/2020 3.0     Monocytes Absolute 06/16/2020 0.70     Eosinophils Absolute 06/16/2020 0.00     Basophils Absolute 06/16/2020 0.10      Copies of these are in the chart. Current Outpatient Medications   Medication Sig Dispense Refill    gabapentin (NEURONTIN) 300 MG capsule Take 1 capsule by mouth 3 times daily for 30 days.  90 capsule 5    metoprolol succinate (TOPROL XL) 25 MG extended release tablet Take 1 tablet by mouth daily 90 tablet 3    omeprazole (PRILOSEC) 40 MG delayed release capsule Take 1 capsule by mouth daily 90 capsule 3    venlafaxine (EFFEXOR XR) 75 MG extended release capsule Take 3 capsules by mouth daily 90 capsule 11    albuterol sulfate (PROAIR RESPICLICK) 402 (90 Base) MCG/ACT aerosol powder inhalation Inhale 2 puffs into the lungs every 6 hours as needed for Wheezing 3 each 3    Semaglutide,0.25 or 0.5MG/DOS, (OZEMPIC, 0.25 OR 0.5 MG/DOSE,) 2 MG/1.5ML SOPN Inject 0.25 mg into the skin once a week 1 pen 11    empagliflozin (JARDIANCE) 10 MG tablet Take 1 tablet by mouth daily 30 tablet 11    rosuvastatin (CRESTOR) 20 MG tablet Take 1 tablet by mouth daily 30 tablet 11    tiotropium-olodaterol (STIOLTO RESPIMAT) 2.5-2.5 MCG/ACT AERS Inhale 2 puffs into the lungs daily 1 each 11    furosemide (LASIX) 20 MG tablet TAKE ONE TABLET BY MOUTH DAILY AS NEEDED FOR SWELLING 30 tablet 2    Omega-3 Fatty Acids (FISH OIL) 1000 MG CAPS Take 1 capsule by mouth 2 times daily 180 capsule 2    nitroGLYCERIN (NITROSTAT) 0.4 MG SL tablet Place 1 tablet under the tongue every 5 minutes as needed for Chest pain up to max of 3 total doses. If no relief after 1 dose, call 911. 25 tablet 1    glipiZIDE (GLUCOTROL) 5 MG tablet TAKE ONE TABLET BY MOUTH TWICE A DAY BEFORE MEALS 180 tablet 1    silver sulfADIAZINE (SILVADENE) 1 % cream Apply topically daily. 50 g 0    Insulin Pen Needle 32G X 4 MM MISC 1 each by Does not apply route daily 100 each 0    glucose monitoring kit (FREESTYLE) monitoring kit 1 kit by Does not apply route 3 times daily 1 kit 0    cloNIDine (CATAPRES) 0.1 MG tablet Take 1 tablet by mouth 2 times daily (Patient taking differently: Take 0.1 mg by mouth 2 times daily as needed) 180 tablet 0    XULTOPHY 100-3.6 UNIT-MG/ML SOPN INJECT 26 UNITS DAILY. ADJUST DOSE UP OR DOWN BY 2 UNITS EVERY 4 DAYS UNTIL AM FASTING SUGAR IS . MAX DOSE 50 UNITS. REPLACES LANTUS. (Patient not taking: Reported on 8/23/2022) 15 mL 5     No current facility-administered medications for this visit.        Allergies: Levaquin [levofloxacin in d5w], Noroxin [norfloxacin], and Prozac [fluoxetine hcl]     Past Medical History:   Diagnosis Date    Anxiety     Arthritis     Bilateral low back pain with sciatica 12/21/2015    Bladder incontinence     CAD (coronary artery disease)     CAD (coronary artery disease)     Cervical cancer (HCC)     H/O    COPD (chronic obstructive pulmonary disease) (HCC)     Depression     Diabetes mellitus (HCC)     GERD (gastroesophageal reflux disease)     History of blood transfusion     Hyperlipidemia     Hypertension     Hypoxia        Family History   Problem Relation Age of Onset    Cancer Mother     Kidney Disease Father         renal failure       Past Surgical History:   Procedure Laterality Date    CARDIAC SURGERY      october 8th 2009    CHOLECYSTECTOMY, LAPAROSCOPIC      CORONARY ARTERY BYPASS GRAFT      8/8 triple       Social History     Tobacco Use    Smoking status: Every Day     Packs/day: 1.00     Years: 45.00     Pack years: 45.00     Types: Cigarettes    Smokeless tobacco: Never   Substance Use Topics    Alcohol use: No        Review of Systems   Respiratory:  Positive for shortness of breath. Physical Exam  Constitutional:       Appearance: She is obese. HENT:      Head: Normocephalic and atraumatic. Right Ear: Tympanic membrane, ear canal and external ear normal.      Left Ear: Tympanic membrane, ear canal and external ear normal.      Nose: Nose normal.      Mouth/Throat:      Mouth: Mucous membranes are moist.      Pharynx: Oropharynx is clear. Comments: edentulous  Eyes:      Extraocular Movements: Extraocular movements intact. Pupils: Pupils are equal, round, and reactive to light. Cardiovascular:      Rate and Rhythm: Normal rate and regular rhythm. Heart sounds: Murmur (soft systolic) heard. Pulmonary:      Breath sounds: Decreased air movement (throughout.) present. Wheezing (diffuse) and rhonchi (diffuse) present. Chest:      Comments: Midline scar   Abdominal:      General: There is no distension. Palpations: Abdomen is soft. Tenderness: There is no abdominal tenderness. Musculoskeletal:         General: No swelling, tenderness or deformity.       Cervical back: Normal range of motion and neck supple. Right lower leg: No edema. Left lower leg: No edema. Skin:     General: Skin is warm and dry. Capillary Refill: Capillary refill takes less than 2 seconds. Neurological:      General: No focal deficit present. Mental Status: She is oriented to person, place, and time. Psychiatric:         Mood and Affect: Mood normal.         Behavior: Behavior normal.         ASSESSMENT      ICD-10-CM    1. Screening for HIV (human immunodeficiency virus)  Z11.4 HIV Rapid 1&2      2. Encounter for hepatitis C screening test for low risk patient  Z11.59 Hepatitis C Antibody      3. Gastroesophageal reflux disease, unspecified whether esophagitis present  K21.9 omeprazole (PRILOSEC) 40 MG delayed release capsule      4. Anxiety and depression  F41.9 venlafaxine (EFFEXOR XR) 75 MG extended release capsule    F32. A       5. Chronic obstructive pulmonary disease, unspecified COPD type ([facility])  J44.9 albuterol sulfate (PROAIR RESPICLICK) 670 (90 Base) MCG/ACT aerosol powder inhalation     tiotropium-olodaterol (STIOLTO RESPIMAT) 2.5-2.5 MCG/ACT AERS      6. Primary hypertension  I10 CBC with Auto Differential     Comprehensive Metabolic Panel     Microalbumin / Creatinine Urine Ratio     metoprolol succinate (TOPROL XL) 25 MG extended release tablet      7. Mixed hyperlipidemia  E78.2 Lipid Panel     rosuvastatin (CRESTOR) 20 MG tablet      8. Cigarette nicotine dependence with nicotine-induced disorder  F17.219       9. Claudication ([facility])  I73.9       10. Coronary artery disease involving native coronary artery of native heart without angina pectoris  I25.10 CBC with Auto Differential     Comprehensive Metabolic Panel     Hemoglobin A1C     Lipid Panel     metoprolol succinate (TOPROL XL) 25 MG extended release tablet      11.  Type 2 diabetes mellitus without complication, with long-term current use of insulin ([facility])  E11.9 Comprehensive Metabolic Panel    T22.7 Hemoglobin A1C     Microalbumin / Creatinine Urine Ratio     Semaglutide,0.25 or 0.5MG/DOS, (OZEMPIC, 0.25 OR 0.5 MG/DOSE,) 2 MG/1.5ML SOPN     empagliflozin (JARDIANCE) 10 MG tablet      12. Fatigue, unspecified type  R53.83 TSH     T4, Free      13. Chronic midline low back pain without sciatica  M54.50 External Referral To Pain Clinic    G89.29       14. Primary osteoarthritis involving multiple joints  M89.49 External Referral To Pain Clinic      15. Diabetic polyneuropathy associated with type 2 diabetes mellitus (HCC)  E11.42 gabapentin (NEURONTIN) 300 MG capsule      16. Hypoxia  R09.02 DME Order for Home Oxygen as OP            PLAN    1. Screening for HIV (human immunodeficiency virus)  Check HIV with low risk  - HIV Rapid 1&2; Future    2. Encounter for hepatitis C screening test for low risk patient  Screen hepatitis C  - Hepatitis C Antibody; Future    3. Gastroesophageal reflux disease, unspecified whether esophagitis present  We are going to use omeprazole 40 mg daily on an empty stomach to control acid reflux  - omeprazole (PRILOSEC) 40 MG delayed release capsule; Take 1 capsule by mouth daily  Dispense: 90 capsule; Refill: 3    4. Anxiety and depression  We are going to utilize Effexor XR but maximum dose is 225 mg daily. This will be utilized in the form of 3 x 75 mg tablets daily  - venlafaxine (EFFEXOR XR) 75 MG extended release capsule; Take 3 capsules by mouth daily  Dispense: 90 capsule; Refill: 11    5. Chronic obstructive pulmonary disease, unspecified COPD type ([facility]  We are going to use a LAMA LABA baseline regimen with Stiolto Respimat we will also give her an albuterol that she can use on a as needed basis. She has oxygen at home and will continue to use that as previously prescribed. - albuterol sulfate (PROAIR RESPICLICK) 477 (90 Base) MCG/ACT aerosol powder inhalation; Inhale 2 puffs into the lungs every 6 hours as needed for Wheezing  Dispense: 3 each;  Refill: 3  - tiotropium-olodaterol (STIOLTO RESPIMAT) 2.5-2.5 MCG/ACT AERS; Inhale 2 puffs into the lungs daily  Dispense: 1 each; Refill: 11    6. Primary hypertension  Blood pressure is appropriately controlled on present medication regimen. We will fill metoprolol succinate to better control heart rate. She will keep track of her ambulatory blood pressure readings and let us know  - CBC with Auto Differential; Future  - Comprehensive Metabolic Panel; Future  - Microalbumin / Creatinine Urine Ratio; Future  - metoprolol succinate (TOPROL XL) 25 MG extended release tablet; Take 1 tablet by mouth daily  Dispense: 90 tablet; Refill: 3    7. Mixed hyperlipidemia  Lipids are not well controlled. We are going to initiate therapy with rosuvastatin 20 mg nightly. Recheck lipids in 1 month  - Lipid Panel; Future  - rosuvastatin (CRESTOR) 20 MG tablet; Take 1 tablet by mouth daily  Dispense: 30 tablet; Refill: 11    8. Cigarette nicotine dependence with nicotine-induced disorder    Discussion on tobacco cessation:  We had a discussion on tobacco cessation including the harms of smoking as well as smoking-related diseases. We also discussed the benefits of smoking cessation. We discussed lung healing and the limitations therein. This discussion was between 3-10 minutes in length    9. Claudication [facility])  We stressed the importance of risk factor control. We are going to try to manage all of these together. The most important intervention would be smoking cessation    10. Coronary artery disease involving native coronary artery of native heart without angina pectoris  Check labs and control risk factors. We will also start Toprol-XL for rate control  - CBC with Auto Differential; Future  - Comprehensive Metabolic Panel; Future  - Hemoglobin A1C; Future  - Lipid Panel; Future  - metoprolol succinate (TOPROL XL) 25 MG extended release tablet; Take 1 tablet by mouth daily  Dispense: 90 tablet; Refill: 3    11.  Type 2 diabetes mellitus without complication, with long-term current use of insulin ([address]  Blood sugars are not controlled at all on present regimen. We are going to institute semaglutide with Jardiance concurrently. This Will Hopefully stabilization of blood sugars but also prevent hypoglycemia. She did not tolerate metformin in the past  - Comprehensive Metabolic Panel; Future  - Hemoglobin A1C; Future  - Microalbumin / Creatinine Urine Ratio; Future  - Semaglutide,0.25 or 0.5MG/DOS, (OZEMPIC, 0.25 OR 0.5 MG/DOSE,) 2 MG/1.5ML SOPN; Inject 0.25 mg into the skin once a week  Dispense: 1 pen; Refill: 11  - empagliflozin (JARDIANCE) 10 MG tablet; Take 1 tablet by mouth daily  Dispense: 30 tablet; Refill: 11    12. Fatigue, unspecified type  Check thyroid  - TSH; Future  - T4, Free; Future    13. Chronic midline low back pain without sciatica  Referral to pain management  - External Referral To Pain Clinic    14. Primary osteoarthritis involving multiple joints  Refer to pain management  - External Referral To Pain Clinic    15. Diabetic polyneuropathy associated with type 2 diabetes mellitus (HCC)  Initiate therapy with gabapentin 300 mg 3 times daily  - gabapentin (NEURONTIN) 300 MG capsule; Take 1 capsule by mouth 3 times daily for 30 days. Dispense: 90 capsule; Refill: 5    16. Hypoxia  We ordered oxygen for her as an outpatient  - DME Order for Home Oxygen as OP      Orders Placed This Encounter   Procedures    CBC with Auto Differential    Comprehensive Metabolic Panel    Hemoglobin A1C    Hepatitis C Antibody    HIV Rapid 1&2    Lipid Panel    Microalbumin / Creatinine Urine Ratio    TSH    T4, Free    External Referral To Pain Clinic    DME Order for Home Oxygen as OP        Return in about 6 weeks (around 10/4/2022) for 30. This was an in-house visit. I spent well over an hour on this encounter.

## 2022-08-25 ASSESSMENT — ENCOUNTER SYMPTOMS: SHORTNESS OF BREATH: 1

## 2022-08-29 ENCOUNTER — TELEPHONE (OUTPATIENT)
Dept: PRIMARY CARE CLINIC | Age: 62
End: 2022-08-29

## 2022-08-29 DIAGNOSIS — G89.29 CHRONIC MIDLINE LOW BACK PAIN WITHOUT SCIATICA: ICD-10-CM

## 2022-08-29 DIAGNOSIS — M54.50 CHRONIC MIDLINE LOW BACK PAIN WITHOUT SCIATICA: ICD-10-CM

## 2022-08-29 DIAGNOSIS — M15.9 PRIMARY OSTEOARTHRITIS INVOLVING MULTIPLE JOINTS: ICD-10-CM

## 2022-08-29 DIAGNOSIS — J44.9 CHRONIC OBSTRUCTIVE PULMONARY DISEASE, UNSPECIFIED COPD TYPE (HCC): Primary | ICD-10-CM

## 2022-08-29 NOTE — TELEPHONE ENCOUNTER
[facility] called and could not process her referral. She was dismissed from their clinic on 8/16/21. I will cancel the referral in Epic. Where would you like to send her?

## 2022-09-08 ENCOUNTER — TELEMEDICINE (OUTPATIENT)
Dept: PRIMARY CARE CLINIC | Age: 62
End: 2022-09-08
Payer: MEDICARE

## 2022-09-08 DIAGNOSIS — F17.200 SMOKER: ICD-10-CM

## 2022-09-08 DIAGNOSIS — Z12.31 ENCOUNTER FOR SCREENING MAMMOGRAM FOR BREAST CANCER: ICD-10-CM

## 2022-09-08 DIAGNOSIS — Z00.00 MEDICARE ANNUAL WELLNESS VISIT, SUBSEQUENT: Primary | ICD-10-CM

## 2022-09-08 DIAGNOSIS — F17.219 CIGARETTE NICOTINE DEPENDENCE WITH NICOTINE-INDUCED DISORDER: ICD-10-CM

## 2022-09-08 PROBLEM — J44.1 COPD WITH ACUTE EXACERBATION (HCC): Status: ACTIVE | Noted: 2021-11-02

## 2022-09-08 PROBLEM — I47.19 AVNRT (AV NODAL RE-ENTRY TACHYCARDIA): Status: ACTIVE | Noted: 2021-11-02

## 2022-09-08 PROBLEM — E11.9 TYPE 2 DIABETES MELLITUS WITHOUT COMPLICATION, WITH LONG-TERM CURRENT USE OF INSULIN (HCC): Status: RESOLVED | Noted: 2018-06-08 | Resolved: 2022-09-08

## 2022-09-08 PROBLEM — J81.0 ACUTE PULMONARY EDEMA (HCC): Status: ACTIVE | Noted: 2021-11-02

## 2022-09-08 PROBLEM — Z79.4 TYPE 2 DIABETES MELLITUS WITHOUT COMPLICATION, WITH LONG-TERM CURRENT USE OF INSULIN (HCC): Status: RESOLVED | Noted: 2018-06-08 | Resolved: 2022-09-08

## 2022-09-08 PROBLEM — J96.01 ACUTE RESPIRATORY FAILURE WITH HYPOXIA (HCC): Status: ACTIVE | Noted: 2021-11-02

## 2022-09-08 PROBLEM — I50.33 ACUTE ON CHRONIC DIASTOLIC CHF (CONGESTIVE HEART FAILURE) (HCC): Status: ACTIVE | Noted: 2021-11-03

## 2022-09-08 PROBLEM — I47.1 AVNRT (AV NODAL RE-ENTRY TACHYCARDIA) (HCC): Status: ACTIVE | Noted: 2021-11-02

## 2022-09-08 PROCEDURE — 3017F COLORECTAL CA SCREEN DOC REV: CPT | Performed by: PEDIATRICS

## 2022-09-08 PROCEDURE — G0439 PPPS, SUBSEQ VISIT: HCPCS | Performed by: PEDIATRICS

## 2022-09-08 RX ORDER — NITROGLYCERIN 0.4 MG/1
0.4 TABLET SUBLINGUAL EVERY 5 MIN PRN
Qty: 25 TABLET | Refills: 1 | Status: SHIPPED | OUTPATIENT
Start: 2022-09-08 | End: 2022-10-08

## 2022-09-08 ASSESSMENT — PATIENT HEALTH QUESTIONNAIRE - PHQ9
SUM OF ALL RESPONSES TO PHQ QUESTIONS 1-9: 11
5. POOR APPETITE OR OVEREATING: 2
8. MOVING OR SPEAKING SO SLOWLY THAT OTHER PEOPLE COULD HAVE NOTICED. OR THE OPPOSITE, BEING SO FIGETY OR RESTLESS THAT YOU HAVE BEEN MOVING AROUND A LOT MORE THAN USUAL: 2
SUM OF ALL RESPONSES TO PHQ QUESTIONS 1-9: 11
6. FEELING BAD ABOUT YOURSELF - OR THAT YOU ARE A FAILURE OR HAVE LET YOURSELF OR YOUR FAMILY DOWN: 2
4. FEELING TIRED OR HAVING LITTLE ENERGY: 1
SUM OF ALL RESPONSES TO PHQ QUESTIONS 1-9: 11
2. FEELING DOWN, DEPRESSED OR HOPELESS: 1
3. TROUBLE FALLING OR STAYING ASLEEP: 1
SUM OF ALL RESPONSES TO PHQ9 QUESTIONS 1 & 2: 2
9. THOUGHTS THAT YOU WOULD BE BETTER OFF DEAD, OR OF HURTING YOURSELF: 0
7. TROUBLE CONCENTRATING ON THINGS, SUCH AS READING THE NEWSPAPER OR WATCHING TELEVISION: 1
10. IF YOU CHECKED OFF ANY PROBLEMS, HOW DIFFICULT HAVE THESE PROBLEMS MADE IT FOR YOU TO DO YOUR WORK, TAKE CARE OF THINGS AT HOME, OR GET ALONG WITH OTHER PEOPLE: 0
1. LITTLE INTEREST OR PLEASURE IN DOING THINGS: 1
SUM OF ALL RESPONSES TO PHQ QUESTIONS 1-9: 11

## 2022-09-08 ASSESSMENT — LIFESTYLE VARIABLES: HOW OFTEN DO YOU HAVE A DRINK CONTAINING ALCOHOL: NEVER

## 2022-09-08 NOTE — PATIENT INSTRUCTIONS
Personalized Preventive Plan for Lobito Quinn - 9/8/2022  Medicare offers a range of preventive health benefits. Some of the tests and screenings are paid in full while other may be subject to a deductible, co-insurance, and/or copay. Some of these benefits include a comprehensive review of your medical history including lifestyle, illnesses that may run in your family, and various assessments and screenings as appropriate. After reviewing your medical record and screening and assessments performed today your provider may have ordered immunizations, labs, imaging, and/or referrals for you. A list of these orders (if applicable) as well as your Preventive Care list are included within your After Visit Summary for your review. Other Preventive Recommendations:    A preventive eye exam performed by an eye specialist is recommended every 1-2 years to screen for glaucoma; cataracts, macular degeneration, and other eye disorders. A preventive dental visit is recommended every 6 months. Try to get at least 150 minutes of exercise per week or 10,000 steps per day on a pedometer . Order or download the FREE \"Exercise & Physical Activity: Your Everyday Guide\" from [employer]. Call 1-462.303.3501 or search [employer] online. You need 8615-3415 mg of calcium and 7882-7638 IU of vitamin D per day. It is possible to meet your calcium requirement with diet alone, but a vitamin D supplement is usually necessary to meet this goal.  When exposed to the sun, use a sunscreen that protects against both UVA and UVB radiation with an SPF of 30 or greater. Reapply every 2 to 3 hours or after sweating, drying off with a towel, or swimming. Always wear a seat belt when traveling in a car. Always wear a helmet when riding a bicycle or motorcycle. Heart-Healthy Diet   Sodium, Fat, and Cholesterol Controlled Diet       What Is a Heart Healthy Diet?    A heart-healthy diet is one that limits sodium , certain types of fat , and cholesterol . This type of diet is recommended for:   People with any form of cardiovascular disease (eg, coronary heart disease , peripheral vascular disease , previous heart attack , previous stroke )   People with risk factors for cardiovascular disease, such as high blood pressure , high cholesterol , or diabetes   Anyone who wants to lower their risk of developing cardiovascular disease   Sodium    Sodium is a mineral found in many foods. In general, most people consume much more sodium than they need. Diets high in sodium can increase blood pressure and lead to edema (water retention). On a heart-healthy diet, you should consume no more than 2,300 mg (milligrams) of sodium per dayabout the amount in one teaspoon of table salt. The foods highest in sodium include table salt (about 50% sodium), processed foods, convenience foods, and preserved foods. Cholesterol    Cholesterol is a fat-like, waxy substance in your blood. Our bodies make some cholesterol. It is also found in animal products, with the highest amounts in fatty meat, egg yolks, whole milk, cheese, shellfish, and organ meats. On a heart-healthy diet, you should limit your cholesterol intake to less than 200 mg per day. It is normal and important to have some cholesterol in your bloodstream. But too much cholesterol can cause plaque to build up within your arteries, which can eventually lead to a heart attack or stroke. The two types of cholesterol that are most commonly referred to are:   Low-density lipoprotein (LDL) cholesterol  Also known as bad cholesterol, this is the cholesterol that tends to build up along your arteries. Bad cholesterol levels are increased by eating fats that are saturated or hydrogenated. Optimal level of this cholesterol is less than 100. Over 130 starts to get risky for heart disease.    High-density lipoprotein (HDL) cholesterol  Also known as good cholesterol, this type of cholesterol actually carries cholesterol away from your arteries and may, therefore, help lower your risk of having a heart attack. You want this level to be high (ideally greater than 60). It is a risk to have a level less than 40. You can raise this good cholesterol by eating olive oil, canola oil, avocados, or nuts. Exercise raises this level, too. Fat    Fat is calorie dense and packs a lot of calories into a small amount of food. Even though fats should be limited due to their high calorie content, not all fats are bad. In fact, some fats are quite healthful. Fat can be broken down into four main types. The good-for-you fats are:   Monounsaturated fat  found in oils such as olive and canola, avocados, and nuts and natural nut butters; can decrease cholesterol levels, while keeping levels of HDL cholesterol high   Polyunsaturated fat  found in oils such as safflower, sunflower, soybean, corn, and sesame; can decrease total cholesterol and LDL cholesterol   Omega-3 fatty acids  particularly those found in fatty fish (such as salmon, trout, tuna, mackerel, herring, and sardines); can decrease risk of arrhythmias, decrease triglyceride levels, and slightly lower blood pressure   The fats that you want to limit are:   Saturated fat  found in animal products, many fast foods, and a few vegetables; increases total blood cholesterol, including LDL levels   Animal fats that are saturated include: butter, lard, whole-milk dairy products, meat fat, and poultry skin   Vegetable fats that are saturated include: hydrogenated shortening, palm oil, coconut oil, cocoa butter   Hydrogenated or trans fat  found in margarine and vegetable shortening, most shelf stable snack foods, and fried foods; increases LDL and decreases HDL     It is generally recommended that you limit your total fat for the day to less than 30% of your total calories.  If you follow an 1800-calorie heart healthy diet, for example, this would mean 60 grams of fat or less per day. Saturated fat and trans fat in your diet raises your blood cholesterol the most, much more than dietary cholesterol does. For this reason, on a heart-healthy diet, less than 7% of your calories should come from saturated fat and ideally 0% from trans fat. On an 1800-calorie diet, this translates into less than 14 grams of saturated fat per day, leaving 46 grams of fat to come from mono- and polyunsaturated fats.    Food Choices on a Heart Healthy Diet   Food Category   Foods Recommended   Foods to Avoid   Grains   Breads and rolls without salted tops Most dry and cooked cereals Unsalted crackers and breadsticks Low-sodium or homemade breadcrumbs or stuffing All rice and pastas   Breads, rolls, and crackers with salted tops High-fat baked goods (eg, muffins, donuts, pastries) Quick breads, self-rising flour, and biscuit mixes Regular bread crumbs Instant hot cereals Commercially prepared rice, pasta, or stuffing mixes   Vegetables   Most fresh, frozen, and low-sodium canned vegetables Low-sodium and salt-free vegetable juices Canned vegetables if unsalted or rinsed   Regular canned vegetables and juices, including sauerkraut and pickled vegetables Frozen vegetables with sauces Commercially prepared potato and vegetable mixes   Fruits   Most fresh, frozen, and canned fruits All fruit juices   Fruits processed with salt or sodium   Milk   Nonfat or low-fat (1%) milk Nonfat or low-fat yogurt Cottage cheese, low-fat ricotta, cheeses labeled as low-fat and low-sodium   Whole milk Reduced-fat (2%) milk Malted and chocolate milk Full fat yogurt Most cheeses (unless low-fat and low salt) Buttermilk (no more than 1 cup per week)   Meats and Beans   Lean cuts of fresh or frozen beef, veal, lamb, or pork (look for the word loin) Fresh or frozen poultry without the skin Fresh or frozen fish and some shellfish Egg whites and egg substitutes (Limit whole eggs to three per week) Tofu Nuts or seeds (unsalted, dry-roasted), low-sodium peanut butter Dried peas, beans, and lentils   Any smoked, cured, salted, or canned meat, fish, or poultry (including payne, chipped beef, cold cuts, hot dogs, sausages, sardines, and anchovies) Poultry skins Breaded and/or fried fish or meats Canned peas, beans, and lentils Salted nuts   Fats and Oils   Olive oil and canola oil Low-sodium, low-fat salad dressings and mayonnaise   Butter, margarine, coconut and palm oils, payne fat   Snacks, Sweets, and Condiments   Low-sodium or unsalted versions of broths, soups, soy sauce, and condiments Pepper, herbs, and spices; vinegar, lemon, or lime juice Low-fat frozen desserts (yogurt, sherbet, fruit bars) Sugar, cocoa powder, honey, syrup, jam, and preserves Low-fat, trans-fat free cookies, cakes, and pies Juan Carlos and animal crackers, fig bars, marciano snaps   High-fat desserts Broth, soups, gravies, and sauces, made from instant mixes or other high-sodium ingredients Salted snack foods Canned olives Meat tenderizers, seasoning salt, and most flavored vinegars   Beverages   Low-sodium carbonated beverages Tea and coffee in moderation Soy milk   Commercially softened water   Suggestions   Make whole grains, fruits, and vegetables the base of your diet. Choose heart-healthy fats such as canola, olive, and flaxseed oil, and foods high in heart-healthy fats, such as nuts, seeds, soybeans, tofu, and fish. Eat fish at least twice per week; the fish highest in omega-3 fatty acids and lowest in mercury include salmon, herring, mackerel, sardines, and canned chunk light tuna. If you eat fish less than twice per week or have high triglycerides, talk to your doctor about taking fish oil supplements. Read food labels. For products low in fat and cholesterol, look for fat free, low-fat, cholesterol free, saturated fat free, and trans fat freeAlso scan the Nutrition Facts Label, which lists saturated fat, trans fat, and cholesterol amounts. For products low in sodium, look for sodium free, very low sodium, low sodium, no added salt, and unsalted   Skip the salt when cooking or at the table; if food needs more flavor, get creative and try out different herbs and spices. Garlic and onion also add substantial flavor to foods. Trim any visible fat off meat and poultry before cooking, and drain the fat off after tobar. Use cooking methods that require little or no added fat, such as grilling, boiling, baking, poaching, broiling, roasting, steaming, stir-frying, and sauting. Avoid fast food and convenience food. They tend to be high in saturated and trans fat and have a lot of added salt. Talk to a registered dietitian for individualized diet advice. Last Reviewed: March 2011 Aristides Yepez MS, MPH, RD   Updated: 3/29/2011     Heart-Healthy Diet   Sodium, Fat, and Cholesterol Controlled Diet       What Is a Heart Healthy Diet? A heart-healthy diet is one that limits sodium , certain types of fat , and cholesterol . This type of diet is recommended for:   People with any form of cardiovascular disease (eg, coronary heart disease , peripheral vascular disease , previous heart attack , previous stroke )   People with risk factors for cardiovascular disease, such as high blood pressure , high cholesterol , or diabetes   Anyone who wants to lower their risk of developing cardiovascular disease   Sodium    Sodium is a mineral found in many foods. In general, most people consume much more sodium than they need. Diets high in sodium can increase blood pressure and lead to edema (water retention). On a heart-healthy diet, you should consume no more than 2,300 mg (milligrams) of sodium per dayabout the amount in one teaspoon of table salt. The foods highest in sodium include table salt (about 50% sodium), processed foods, convenience foods, and preserved foods. Cholesterol    Cholesterol is a fat-like, waxy substance in your blood.  Our bodies acids  particularly those found in fatty fish (such as salmon, trout, tuna, mackerel, herring, and sardines); can decrease risk of arrhythmias, decrease triglyceride levels, and slightly lower blood pressure   The fats that you want to limit are:   Saturated fat  found in animal products, many fast foods, and a few vegetables; increases total blood cholesterol, including LDL levels   Animal fats that are saturated include: butter, lard, whole-milk dairy products, meat fat, and poultry skin   Vegetable fats that are saturated include: hydrogenated shortening, palm oil, coconut oil, cocoa butter   Hydrogenated or trans fat  found in margarine and vegetable shortening, most shelf stable snack foods, and fried foods; increases LDL and decreases HDL     It is generally recommended that you limit your total fat for the day to less than 30% of your total calories. If you follow an 1800-calorie heart healthy diet, for example, this would mean 60 grams of fat or less per day. Saturated fat and trans fat in your diet raises your blood cholesterol the most, much more than dietary cholesterol does. For this reason, on a heart-healthy diet, less than 7% of your calories should come from saturated fat and ideally 0% from trans fat. On an 1800-calorie diet, this translates into less than 14 grams of saturated fat per day, leaving 46 grams of fat to come from mono- and polyunsaturated fats.    Food Choices on a Heart Healthy Diet   Food Category   Foods Recommended   Foods to Avoid   Grains   Breads and rolls without salted tops Most dry and cooked cereals Unsalted crackers and breadsticks Low-sodium or homemade breadcrumbs or stuffing All rice and pastas   Breads, rolls, and crackers with salted tops High-fat baked goods (eg, muffins, donuts, pastries) Quick breads, self-rising flour, and biscuit mixes Regular bread crumbs Instant hot cereals Commercially prepared rice, pasta, or stuffing mixes   Vegetables   Most fresh, frozen, and low-sodium canned vegetables Low-sodium and salt-free vegetable juices Canned vegetables if unsalted or rinsed   Regular canned vegetables and juices, including sauerkraut and pickled vegetables Frozen vegetables with sauces Commercially prepared potato and vegetable mixes   Fruits   Most fresh, frozen, and canned fruits All fruit juices   Fruits processed with salt or sodium   Milk   Nonfat or low-fat (1%) milk Nonfat or low-fat yogurt Cottage cheese, low-fat ricotta, cheeses labeled as low-fat and low-sodium   Whole milk Reduced-fat (2%) milk Malted and chocolate milk Full fat yogurt Most cheeses (unless low-fat and low salt) Buttermilk (no more than 1 cup per week)   Meats and Beans   Lean cuts of fresh or frozen beef, veal, lamb, or pork (look for the word loin) Fresh or frozen poultry without the skin Fresh or frozen fish and some shellfish Egg whites and egg substitutes (Limit whole eggs to three per week) Tofu Nuts or seeds (unsalted, dry-roasted), low-sodium peanut butter Dried peas, beans, and lentils   Any smoked, cured, salted, or canned meat, fish, or poultry (including payne, chipped beef, cold cuts, hot dogs, sausages, sardines, and anchovies) Poultry skins Breaded and/or fried fish or meats Canned peas, beans, and lentils Salted nuts   Fats and Oils   Olive oil and canola oil Low-sodium, low-fat salad dressings and mayonnaise   Butter, margarine, coconut and palm oils, payne fat   Snacks, Sweets, and Condiments   Low-sodium or unsalted versions of broths, soups, soy sauce, and condiments Pepper, herbs, and spices; vinegar, lemon, or lime juice Low-fat frozen desserts (yogurt, sherbet, fruit bars) Sugar, cocoa powder, honey, syrup, jam, and preserves Low-fat, trans-fat free cookies, cakes, and pies Juan Carlos and animal crackers, fig bars, marciano snaps   High-fat desserts Broth, soups, gravies, and sauces, made from instant mixes or other high-sodium ingredients Salted snack foods Canned olives Meat tenderizers, seasoning salt, and most flavored vinegars   Beverages   Low-sodium carbonated beverages Tea and coffee in moderation Soy milk   Commercially softened water   Suggestions   Make whole grains, fruits, and vegetables the base of your diet. Choose heart-healthy fats such as canola, olive, and flaxseed oil, and foods high in heart-healthy fats, such as nuts, seeds, soybeans, tofu, and fish. Eat fish at least twice per week; the fish highest in omega-3 fatty acids and lowest in mercury include salmon, herring, mackerel, sardines, and canned chunk light tuna. If you eat fish less than twice per week or have high triglycerides, talk to your doctor about taking fish oil supplements. Read food labels. For products low in fat and cholesterol, look for fat free, low-fat, cholesterol free, saturated fat free, and trans fat freeAlso scan the Nutrition Facts Label, which lists saturated fat, trans fat, and cholesterol amounts. For products low in sodium, look for sodium free, very low sodium, low sodium, no added salt, and unsalted   Skip the salt when cooking or at the table; if food needs more flavor, get creative and try out different herbs and spices. Garlic and onion also add substantial flavor to foods. Trim any visible fat off meat and poultry before cooking, and drain the fat off after tobar. Use cooking methods that require little or no added fat, such as grilling, boiling, baking, poaching, broiling, roasting, steaming, stir-frying, and sauting. Avoid fast food and convenience food. They tend to be high in saturated and trans fat and have a lot of added salt. Talk to a registered dietitian for individualized diet advice. Last Reviewed: March 2011 Brita Dubin, MS, MPH, RD   Updated: 3/29/2011     Heart-Healthy Diet   Sodium, Fat, and Cholesterol Controlled Diet       What Is a Heart Healthy Diet?    A heart-healthy diet is one that limits sodium , certain types of fat , and cholesterol . This type of diet is recommended for:   People with any form of cardiovascular disease (eg, coronary heart disease , peripheral vascular disease , previous heart attack , previous stroke )   People with risk factors for cardiovascular disease, such as high blood pressure , high cholesterol , or diabetes   Anyone who wants to lower their risk of developing cardiovascular disease   Sodium    Sodium is a mineral found in many foods. In general, most people consume much more sodium than they need. Diets high in sodium can increase blood pressure and lead to edema (water retention). On a heart-healthy diet, you should consume no more than 2,300 mg (milligrams) of sodium per dayabout the amount in one teaspoon of table salt. The foods highest in sodium include table salt (about 50% sodium), processed foods, convenience foods, and preserved foods. Cholesterol    Cholesterol is a fat-like, waxy substance in your blood. Our bodies make some cholesterol. It is also found in animal products, with the highest amounts in fatty meat, egg yolks, whole milk, cheese, shellfish, and organ meats. On a heart-healthy diet, you should limit your cholesterol intake to less than 200 mg per day. It is normal and important to have some cholesterol in your bloodstream. But too much cholesterol can cause plaque to build up within your arteries, which can eventually lead to a heart attack or stroke. The two types of cholesterol that are most commonly referred to are:   Low-density lipoprotein (LDL) cholesterol  Also known as bad cholesterol, this is the cholesterol that tends to build up along your arteries. Bad cholesterol levels are increased by eating fats that are saturated or hydrogenated. Optimal level of this cholesterol is less than 100. Over 130 starts to get risky for heart disease.    High-density lipoprotein (HDL) cholesterol  Also known as good cholesterol, this type of cholesterol actually carries cholesterol away from your arteries and may, therefore, help lower your risk of having a heart attack. You want this level to be high (ideally greater than 60). It is a risk to have a level less than 40. You can raise this good cholesterol by eating olive oil, canola oil, avocados, or nuts. Exercise raises this level, too. Fat    Fat is calorie dense and packs a lot of calories into a small amount of food. Even though fats should be limited due to their high calorie content, not all fats are bad. In fact, some fats are quite healthful. Fat can be broken down into four main types. The good-for-you fats are:   Monounsaturated fat  found in oils such as olive and canola, avocados, and nuts and natural nut butters; can decrease cholesterol levels, while keeping levels of HDL cholesterol high   Polyunsaturated fat  found in oils such as safflower, sunflower, soybean, corn, and sesame; can decrease total cholesterol and LDL cholesterol   Omega-3 fatty acids  particularly those found in fatty fish (such as salmon, trout, tuna, mackerel, herring, and sardines); can decrease risk of arrhythmias, decrease triglyceride levels, and slightly lower blood pressure   The fats that you want to limit are:   Saturated fat  found in animal products, many fast foods, and a few vegetables; increases total blood cholesterol, including LDL levels   Animal fats that are saturated include: butter, lard, whole-milk dairy products, meat fat, and poultry skin   Vegetable fats that are saturated include: hydrogenated shortening, palm oil, coconut oil, cocoa butter   Hydrogenated or trans fat  found in margarine and vegetable shortening, most shelf stable snack foods, and fried foods; increases LDL and decreases HDL     It is generally recommended that you limit your total fat for the day to less than 30% of your total calories. If you follow an 1800-calorie heart healthy diet, for example, this would mean 60 grams of fat or less per day.    Saturated fat and trans fat in your diet raises your blood cholesterol the most, much more than dietary cholesterol does. For this reason, on a heart-healthy diet, less than 7% of your calories should come from saturated fat and ideally 0% from trans fat. On an 1800-calorie diet, this translates into less than 14 grams of saturated fat per day, leaving 46 grams of fat to come from mono- and polyunsaturated fats.    Food Choices on a Heart Healthy Diet   Food Category   Foods Recommended   Foods to Avoid   Grains   Breads and rolls without salted tops Most dry and cooked cereals Unsalted crackers and breadsticks Low-sodium or homemade breadcrumbs or stuffing All rice and pastas   Breads, rolls, and crackers with salted tops High-fat baked goods (eg, muffins, donuts, pastries) Quick breads, self-rising flour, and biscuit mixes Regular bread crumbs Instant hot cereals Commercially prepared rice, pasta, or stuffing mixes   Vegetables   Most fresh, frozen, and low-sodium canned vegetables Low-sodium and salt-free vegetable juices Canned vegetables if unsalted or rinsed   Regular canned vegetables and juices, including sauerkraut and pickled vegetables Frozen vegetables with sauces Commercially prepared potato and vegetable mixes   Fruits   Most fresh, frozen, and canned fruits All fruit juices   Fruits processed with salt or sodium   Milk   Nonfat or low-fat (1%) milk Nonfat or low-fat yogurt Cottage cheese, low-fat ricotta, cheeses labeled as low-fat and low-sodium   Whole milk Reduced-fat (2%) milk Malted and chocolate milk Full fat yogurt Most cheeses (unless low-fat and low salt) Buttermilk (no more than 1 cup per week)   Meats and Beans   Lean cuts of fresh or frozen beef, veal, lamb, or pork (look for the word loin) Fresh or frozen poultry without the skin Fresh or frozen fish and some shellfish Egg whites and egg substitutes (Limit whole eggs to three per week) Tofu Nuts or seeds (unsalted, dry-roasted), low-sodium peanut butter Dried peas, beans, and lentils   Any smoked, cured, salted, or canned meat, fish, or poultry (including payne, chipped beef, cold cuts, hot dogs, sausages, sardines, and anchovies) Poultry skins Breaded and/or fried fish or meats Canned peas, beans, and lentils Salted nuts   Fats and Oils   Olive oil and canola oil Low-sodium, low-fat salad dressings and mayonnaise   Butter, margarine, coconut and palm oils, payne fat   Snacks, Sweets, and Condiments   Low-sodium or unsalted versions of broths, soups, soy sauce, and condiments Pepper, herbs, and spices; vinegar, lemon, or lime juice Low-fat frozen desserts (yogurt, sherbet, fruit bars) Sugar, cocoa powder, honey, syrup, jam, and preserves Low-fat, trans-fat free cookies, cakes, and pies Juan Carlos and animal crackers, fig bars, marciano snaps   High-fat desserts Broth, soups, gravies, and sauces, made from instant mixes or other high-sodium ingredients Salted snack foods Canned olives Meat tenderizers, seasoning salt, and most flavored vinegars   Beverages   Low-sodium carbonated beverages Tea and coffee in moderation Soy milk   Commercially softened water   Suggestions   Make whole grains, fruits, and vegetables the base of your diet. Choose heart-healthy fats such as canola, olive, and flaxseed oil, and foods high in heart-healthy fats, such as nuts, seeds, soybeans, tofu, and fish. Eat fish at least twice per week; the fish highest in omega-3 fatty acids and lowest in mercury include salmon, herring, mackerel, sardines, and canned chunk light tuna. If you eat fish less than twice per week or have high triglycerides, talk to your doctor about taking fish oil supplements. Read food labels. For products low in fat and cholesterol, look for fat free, low-fat, cholesterol free, saturated fat free, and trans fat freeAlso scan the Nutrition Facts Label, which lists saturated fat, trans fat, and cholesterol amounts.    For products low in sodium, look for sodium free, very low sodium, low sodium, no added salt, and unsalted   Skip the salt when cooking or at the table; if food needs more flavor, get creative and try out different herbs and spices. Garlic and onion also add substantial flavor to foods. Trim any visible fat off meat and poultry before cooking, and drain the fat off after tobar. Use cooking methods that require little or no added fat, such as grilling, boiling, baking, poaching, broiling, roasting, steaming, stir-frying, and sauting. Avoid fast food and convenience food. They tend to be high in saturated and trans fat and have a lot of added salt. Talk to a registered dietitian for individualized diet advice. Last Reviewed: March 2011 Pia Thorne MS, MPH, RD   Updated: 3/29/2011       Preventing Osteoporosis: After Your Visit  Your Care Instructions  Osteoporosis means the bones are weak and thin enough that they can break easily. The older you are, the more likely you are to get osteoporosis. But with plenty of calcium, vitamin D, and exercise, you can help prevent osteoporosis. The preteen and teen years are a key time for bone building. With the help of calcium, vitamin D, and exercise in those early years and beyond, the bones reach their peak density and strength by age 27. After age 27, your bones naturally start to thin and weaken. The stronger your bones are at around age 27, the lower your risk for osteoporosis. But no matter what your age and risk are, your bones still need calcium, vitamin D, and exercise to stay strong. Also avoid smoking, and limit alcohol. Smoking and heavy alcohol use can make your bones thinner. Talk to your doctor about any special risks you might have, such as having a close relative with osteoporosis or taking a medicine that can weaken bones. Your doctor can tell you the best ways to protect your bones from thinning. Follow-up care is a key part of your treatment and safety.  Be sure to make and go to all appointments, and call your doctor if you are having problems. It's also a good idea to know your test results and keep a list of the medicines you take. How can you care for yourself at home? Get enough calcium and vitamin D. The [place] of Medicine recommends adults younger than age 46 need 1,000 mg of calcium and 600 IU of vitamin D each day. Women ages 46 to 79 need 1,200 mg of calcium and 600 IU of vitamin D each day. Men ages 46 to 79 need 1,000 mg of calcium and 600 IU of vitamin D each day. Adults 71 and older need 1,200 mg of calcium and 800 IU of vitamin D each day. Eat foods rich in calcium, like yogurt, cheese, milk, and dark green vegetables. Eat foods rich in vitamin D, like eggs, fatty fish, cereal, and fortified milk. Get some sunshine. Your body uses sunshine to make its own vitamin D. The safest time to be out in the sun is before 10 a.m. or after 3 p.m. Avoid getting sunburned. Sunburn can increase your risk of skin cancer. Talk to your doctor about taking a calcium plus vitamin D supplement. Ask about what type of calcium is right for you, and how much to take at a time. Adults ages 23 to 48 should not get more than 2,500 mg of calcium and 4,000 IU of vitamin D each day, whether it is from supplements and/or food. Adults ages 46 and older should not get more than 2,000 mg of calcium and 4,000 IU of vitamin D each day from supplements and/or food. Get regular bone-building exercise. Weight-bearing and resistance exercises keep bones healthy by working the muscles and bones against gravity. Start out at an exercise level that feels right for you. Add a little at a time until you can do the following:  Do 30 minutes of weight-bearing exercise on most days of the week. Walking, jogging, stair climbing, and dancing are good choices. Do resistance exercises with weights or elastic bands 2 to 3 days a week. Limit alcohol. Drink no more than 1 alcohol drink a day if you are a woman. Drink no more than 2 alcohol drinks a day if you are a man. Do not smoke. Smoking can make bones thin faster. If you need help quitting, talk to your doctor about stop-smoking programs and medicines. These can increase your chances of quitting for good. When should you call for help? Watch closely for changes in your health, and be sure to contact your doctor if:  You need help with a healthy eating plan. You need help with an exercise plan    © 9295-0447 [employer]. Care instructions adapted under license by [facility]. This care instruction is for use with your licensed healthcare professional. If you have questions about a medical condition or this instruction, always ask your healthcare professional. [address] any warranty or liability for your use of this information. Content Version: 9.4.80694; Last Revised: June 20, 2011                Keep Your Memory [place] Records       Many factors can affect your ability to remembera hectic lifestyle, aging, stress, chronic disease, and certain medicines. But, there are steps you can take to sharpen your mind and help preserve your memory. Challenge Your Brain   Regularly challenging your mind may help keeps it in top shape. Good mental exercises include:   Crossword puzzlesUse a dictionary if you need it; you will learn more that way. Brainteasers Try some! Crafts, such as wood working and sewing   Hobbies, such as gardening and building model airplanes   SocializingVisit old friends or join groups to meet new ones.    Reading   Learning a new language   Taking a class, whether it be art history or fady chi   TravelingExperience the food, history, and culture of your destination   Learning to use a computer   Going to museums, the theater, or thought-provoking movies   Changing things in your daily life, such as reversing your pattern in the grocery store or brushing your teeth using your nondominant hand   Use Memory Aids   There is no need to remember every detail on your own. These memory aids can help:   Calendars and day planners   Electronic organizers to store all sorts of helpful informationThese devices can \"beep\" to remind you of appointments. A book of days to record birthdays, anniversaries, and other occasions that occur on the same date every year   Detailed \"to-do\" lists and strategically placed sticky notes   Quick \"study\" sessionsBefore a gathering, review who will be there so their names will be fresh in your mind. Establish routinesFor example, keep your keys, wallet, and umbrella in the same place all the time or take medicine with your 8:00 AM glass of juice   Live a Healthy Life   Many actions that will keep your body strong will do the same for your mind. For example:   Talk to Your Doctor About Herbs and Supplements    Malnutrition and vitamin deficiencies can impair your mental function. For example, vitamin B12 deficiency can cause a range of symptoms, including confusion. But, what if your nutritional needs are being met? Can herbs and supplements still offer a benefit? Researchers have investigated a range of natural remedies, such as ginkgo , ginseng , and the supplement phosphatidylserine (PS). So far, though, the evidence is inconsistent as to whether these products can improve memory or thinking. If you are interested in taking herbs and supplements, talk to your doctor first because they may interact with other medicines that you are taking. Exercise Regularly    Among the many benefits of regular exercise are increased blood flow to the brain and decreased risk of certain diseases that can interfere with memory function. One study found that even moderate exercise has a beneficial effect.  Examples of \"moderate\" exercise include:   Playing 18 holes of golf once a week, without a cart   Playing tennis twice a week   Walking one mile per day   Manage Stress    It can be tough to remember medicines can cause dizziness. If you have problems with sleep, talk to your doctor. Limit your intake of alcohol. If necessary, use a cane or walker to help maintain your balance. Wear supportive, rubber-soled shoes, even at home. If you live in a region that gets wintry weather, you may want to put special cleats on your shoes to prevent you from slipping on the snow and ice. Exercise regularly to help maintain muscle tone, agility, and balance. Always hold the banister when going up or down stairs. Also, use  bars when getting in or out of the bath or shower, or using the toilet. To avoid dizziness, get up slowly from a lying down position. Sit up first, dangling your legs for a minute or two before rising to a standing position. Overall Home Safety Check   According to the Consumer Product Safety Commision's \"Older Consumer Home Safety Checklist,\" it is important to check for potential hazards in each room. And remember, proper lighting is an essential factor in home safety. If you cannot see clearly, you are more likely to fall. Important questions to ask yourself include:   Are lamp, electric, extension, and telephone cords placed out of the flow of traffic and maintained in good condition? Have frayed cords been replaced? Are all small rugs and runners slip resistant? If not, you can secure them to the floor with a special double-sided carpet tape. Are smoke detectors properly locatedone on every floor of your home and one outside of every sleeping area? Are they in good working order? Are batteries replaced at least once a year? Do you have a well-maintained carbon monoxide detector outside every sleeping are in your home? Does your furniture layout leave plenty of space to maneuver between and around chairs, tables, beds, and sofas? Are hallways, stairs and passages between rooms well lit? Can you reach a lamp without getting out of bed? Are floor surfaces well maintained? Shag rugs, high-pile carpeting, tile floors, and polished wood floors can be particularly slippery. Stairs should always have handrails and be carpeted or fitted with a non-skid tread. Is your telephone easily reachable. Is the cord safely tucked away? Room by Room   According to the Association of Aging, bathrooms and torres are the two most potentially hazardous rooms in your home. In the Kitchen    Be sure your stove is in proper working order and always make sure burners and the oven are off before you go out or go to sleep. Keep pots on the back burners, turn handles away from the front of the stove, and keep stove clean and free of grease build-up. Kitchen ventilation systems and range exhausts should be working properly. Keep flammable objects such as towels and pot holders away from the cooking area except when in use. Make sure kitchen curtains are tied back. Move cords and appliances away from the sink and hot surfaces. If extension cords are needed, install wiring guides so they do not hang over the sink, range, or working areas. Look for coffee pots, kettles and toaster ovens with automatic shut-offs. Keep a mop handy in the kitchen so you can wipe up spills instantly. You should also have a small fire extinguisher. Arrange your kitchen with frequently used items on lower shelves to avoid the need to stand on a stepstool to reach them. Make sure countertops are well-lit to avoid injuries while cutting and preparing food. In the Bathroom    Use a non-slip mat or decals in the tub and shower, since wet, soapy tile or porcelain surfaces are extremely slippery. Make sure bathroom rugs are non-skid or tape them firmly to the floor. Bathtubs should have at least one, preferably two, grab bars, firmly attached to structural supports in the wall.  (Do not use built-in soap holders or glass shower doors as grab bars.)    Tub seats fitted with non-slip material on the legs allow you to wash sitting down. For people with limited mobility, bathtub transfer benches allow you to slide safely into the tub. Raised toilet seats and toilet safety rails are helpful for those with knee or hip problems. In the [facility]    Make sure you use a nightlight and that the area around your bed is clear of potential obstacles. Be careful with electric blankets and never go to sleep with a heating pad, which can cause serious burns even if on a low setting. Use fire-resistant mattress covers and pillows, and NEVER smoke in bed. Keep a phone next to the bed that is programmed to dial 911 at the push of a button. If you have a chronic condition, you may want to sign on with an automatic call-in service. Typically the system includes a small pendant that connects directly to an emergency medical voice-response system. You should also make arrangements to stay in contact with someonefriend, neighbor, family memberon a regular schedule. Fire Prevention   According to the [employer]. (Smoke Alarms for Every) [address] [place] [address], senior citizens are one of the two highest risk groups for death and serious injuries due to residential fires. When cooking, wear short-sleeved items, never a bulky long-sleeved robe. The [facility]'s Safety Checklist for Older Consumers emphasizes the importance of checking basements, garages, workshops and storage areas for fire hazards, such as volatile liquids, piles of old rags or clothing and overloaded circuits. Never smoke in bed or when lying down on a couch or recliner chair. Small portable electric or kerosene heaters are responsible for many home fires and should be used cautiously if at all. If you do use one, be sure to keep them away from flammable materials. In case of fire, make sure you have a pre-established emergency exit plan. Have a professional check your fireplace and other fuel-burning appliances yearly.     Helping Hands   Baby boomers entering the ferrer years will continue to see the development of new products to help older adults live safely and independently in spite of age-related changes. Making Life More Livable  , by Merrick Heck, lists over 1,000 products for \"living well in the mature years,\" such as bathing and mobility aids, household security devices, ergonomically designed knives and peelers, and faucet valves and knobs for temperature control. Medical supply stores and organizations are good sources of information about products that improve your quality of life and insure your safety. Last Reviewed: November 2009 Homer Merlos MD   Updated: 3/7/2011            Advance Care Planning: Care Instructions  Your Care Instructions     It can be hard to live with an illness that cannot be cured. But if your health is getting worse, you may want to make decisions about end-of-life care. Planning for the end of your life does not mean that you are giving up. It is a way to make sure that your wishes are met. Clearly stating your wishes can make it easier for your loved ones. Making plans while you are still able may alsoease your mind and make your final days less stressful and more meaningful. Follow-up care is a key part of your treatment and safety. Be sure to make and go to all appointments, and call your doctor if you are having problems. It's also a good idea to know your test results and keep alist of the medicines you take. What can you do to plan for the end of life? You can bring these issues up with your doctor. You do not need to wait until your doctor starts the conversation. You might start with, \"What makes life worth living for me is. Fransisco Antonioe \" And then follow it with, \"I would not be willing to live with . [place] Chime Fransisco Chime Fransisco Chime \" When you complete this sentence it helps your doctor understand your wishes. Talk openly and honestly with your doctor.  This is the best way to understand the decisions you will need to make as your health changes. Know that you can always change your mind. Ask your doctor about commonly used life-support measures. These include tube feedings, breathing machines, and fluids given through a vein (IV). Understanding these treatments will help you decide whether you want them. You may choose to have these life-supporting treatments for a limited time. This allows a trial period to see whether they will help you. You may also decide that you want your doctor to take only certain measures to keep you alive. It may help to think about the big picture, like what makes life worth living for you or what your values and goals are. Talk to your doctor about how long you are likely to live. Your doctor may be able to give you an idea of what usually happens with your specific illness. Think about preparing papers that state your wishes. These papers are called advance directives. If you do this early and review them often, there will not be any confusion about what you want. You can change your instructions at any time. Which papers should you prepare? Advance directives are legal papers that tell doctors how you want to be cared for at the end of your life. You do not need a  to write these papers. Ask your doctor or your state [facility] department for information on how to write your advance directives. They may have the forms for each of these types of papers. Make sure your doctor has a copy of these on file, and give a copy to afamily member or close friend. Consider a do-not-resuscitate order (DNR). This order asks that no extra treatments be done if your heart stops or you stop breathing. Extra treatments may include cardiopulmonary resuscitation (CPR), electrical shock to restart your heart, or a machine to breathe for you. If you decide to have a DNR order, ask your doctor to explain and write it. Place the order in your home where everyone can easily see it. Consider a living will.  A living will explains your wishes about life support and other treatments at the end of your life if you become unable to speak for yourself. Living kendall tell doctors to use or not use treatments that would keep you alive. You must have one or two witnesses or a notary present when you sign this form. A living will may be called something else in your state. Consider a medical power of . This form allows you to name a person to make decisions about your care if you are not able to. Most people ask a close friend or family member. Talk to this person about the kinds of treatments you want and those that you do not want. Make sure this person understands your wishes. A medical power of  may be called something else in your state. These legal papers are simple to change. Tell your doctor what you want to change, and ask him or her to make a note in your medical file. Give yourfamily updated copies of the papers. Where can you learn more? Go to https://[email or web address].[email or web address]. org and sign in to your OwnLocal account. Enter P184 in the 360incentives.com box to learn more about \"Advance Care Planning: Care Instructions. \"     If you do not have an account, please click on the \"Sign Up Now\" link. Current as of: October 18, 2021               Content Version: 13.3  © 2006-2022 Healthwise, Incorporated. Care instructions adapted under license by [facility] If you have questions about a medical condition or this instruction, always ask your healthcare professional. [place] [address] any warranty or liability for your use of this information. Learning About Living [place] [employer]  What is a living will? A living will, also called a declaration, is a legal form. It tells your family and your doctor your wishes when you can't speak for yourself. It's used by the health professionals who will treat you as you near the end of your life or ifyou get seriously hurt or ill.   If you put your wishes in writing, your loved ones and others will know what kind of care you want. They won't need to guess. This can ease your mind and behelpful to others. And you can change or cancel your living will at any time. A living will is not the same as an estate or property will. An estate willexplains what you want to happen with your money and property after you die. How do you use it? Keep these facts in mind about how a living will is used. Your living will is used only if you can't speak or make decisions for yourself. Most often, one or more doctors must certify that you can't speak or decide for yourself before your living will takes effect. If you get better and can speak for yourself again, you can accept or refuse any treatment. It doesn't matter what you said in your living will. Some states may limit your right to refuse treatment in certain cases. For example, you may need to clearly state in your living will that you don't want artificial hydration and nutrition, such as being fed through a tube. Is a living will a legal document? A living will is a legal document. Each state has its own laws about livingwills. And a living will may be called something else in your state. Here are some things to know about living kendall. You don't need an  to complete a living will. But legal advice can be helpful if your state's laws are unclear. It can also help if your health history is complicated or your family can't agree on what should be in your living will. You can change your living will at any time. Some people find that their wishes about end-of-life care change as their health changes. If you make big changes to your living will, complete a new form. If you move to another state, make sure that your living will is legal in the state where you now live. In most cases, doctors will respect your wishes even if you have a form from a different state.   You might use a universal form that has been approved by many states. This kind of form can sometimes be filled out and stored online. Your digital copy will then be available wherever you have a connection to the internet. The doctors and nurses who need to treat you can find it right away. Your state may offer an online registry. This is another place where you can store your living will online. It's a good idea to get your living will notarized. This means using a person called a  to watch two people sign, or witness, your living will. What should you know when you create a living will? Here are some questions to ask yourself as you make your living will. Do you know enough about life support methods that might be used? If not, talk to your doctor so you know what might be done if you can't breathe on your own, your heart stops, or you can't swallow. What things would you still want to be able to do after you receive life-support methods? Would you want to be able to walk? To speak? To eat on your own? To live without the help of machines? Do you want certain Rastafarian practices performed if you become very ill? If you have a choice, where do you want to be cared for? In your home? At a hospital or nursing home? If you have a choice at the end of your life, where would you prefer to die? At home? In a hospital or nursing home? Somewhere else? Would you prefer to be buried or cremated? Do you want your organs to be donated after you die? What should you do with your living will? Make sure that your family members and your health care agent have copies of your living will (also called a declaration). Give your doctor a copy of your living will. Ask to have it kept as part of your medical record. If you have more than one doctor, make sure that each one has a copy. Put a copy of your living will where it can be easily found. For example, some people may put a copy on their refrigerator door.  If you are using a digital copy, be sure your doctor, family members, and health care agent know how to find and access it. Where can you learn more? Go to [email or web address] and sign in to your [employer] account. Enter F757 in the [employer] box to learn more about \"Learning About Living Feliberto Stern. \"     If you do not have an account, please click on the \"Sign Up Now\" link. Current as of: October 18, 2021               Content Version: 13.3  © 8293-6802 [employer]. Care instructions adapted under license by [facility] ([facility]). If you have questions about a medical condition or this instruction, always ask your healthcare professional. [address] any warranty or liability for your use of this information. Learning About Medical Power of   What is a medical power of ? A medical power of , also called a durable power of  for health care, is one type of the legal forms called advance directives. It lets you name the person you want to make treatment decisions for you if you can't speak or decide for yourself. The person you choose is called your health care agent. This person is also called a health care proxy or health care surrogate. A medical power of  may be called something else in your state. How do you choose a health care agent? Choose your health care agent carefully. This person may or may not be a familymember. Talk to the person before you make your final decision. Make sure he or she iscomfortable with this responsibility. It's a good idea to choose someone who:  Is at least 25years old. Knows you well and understands what makes life meaningful for you. Understands your Buddhism and moral values. Will do what you want, not what he or she wants. Will be able to make difficult choices at a stressful time. Will be able to refuse or stop treatment, if that is what you would want, even if you could die.   Will be firm and confident with health professionals if needed. Will ask questions to get needed information. Lives near you or agrees to travel to you if needed. Your family may help you make medical decisions while you can still be part of that process. But it's important to choose one person to be your health careagent in case you aren't able to make decisions for yourself. If you don't fill out the legal form and name a health care agent, thedecisions your family can make may be limited. A health care agent may be called something else in your state. Who will make decisions for you if you don't have a health care agent? If you don't have a health care agent or a living will, you may not get the care you want. Decisions may be made by family members who disagree about your medical care. Or decisions may be made by a medical professional who doesn'tknow you well. In some cases, a  makes the decisions. When you name a health care agent, it is very clear who has the power to [place] decisions for you. How do you name a health care agent? You name your health care agent on a legal form. This form is usually called a medical power of . Ask your hospital, state bar association, or officeon aging where to find these forms. You must sign the form to make it legal. Some states require you to get the form notarized. This means that a person called a  watches you sign the form and then he or she signs the form. Some states also require thattwo or more witnesses sign the form. Be sure to tell your family members and doctors who your health care agent is. Where can you learn more? Go to [email or web address]://sana."[email or web address]. org and sign in to your [employer] account. Enter [id number] in the [employer] box to learn more about \"Learning About [address]. \"     If you do not have an account, please click on the \"Sign Up Now\" link.   Current as of: October 18, 2021               Content Version: 13.3  © 1526-5461 Healthwise[employer] Incorporated. Care instructions adapted under license by [facility] ([facility]). If you have questions about a medical condition or this instruction, always ask your healthcare professional. [address] any warranty or liability for your use of this information.

## 2022-09-08 NOTE — PROGRESS NOTES
Medicare Annual Wellness Visit    Deloris Streeter is called by phone in her home for Medicare AWV    Assessment & Plan   Medicare annual wellness visit, subsequent  Cigarette nicotine dependence with nicotine-induced disorder  -     CT lung screen [Initial/Annual]; Future  Smoker  -     CT lung screen [Initial/Annual]; Future  Encounter for screening mammogram for breast cancer  -     CARMEN Digital Screen Bilateral; Future    Recommendations for Preventive Services Due: see orders and patient instructions/AVS.  Recommended screening schedule for the next 5-10 years is provided to the patient in written form: see Patient Instructions/AVS.     No follow-ups on file. Subjective   Isaiah Patten is doing fair. She has very little contact with her family as they all live in [place]. She is having problems with depression, but has had some medication changes from her last visit with Dr. Christina Dietz. She does feel it is helping, but still feels depressed. She does not have any suicidal thoughts or plans. She does have a friend who helps her with her care and lives in the same complex as she does. She does not have a car and cannot easily get out and get things without someone taking her. Her adult children work \"all the time\". She states she cannot get ahead living on a fixed income. Patient's complete Health Risk Assessment and screening values have been reviewed and are found in Flowsheets. The following problems were reviewed today and where indicated follow up appointments were made and/or referrals ordered.     Positive Risk Factor Screenings with Interventions:    Fall Risk:  Do you feel unsteady or are you worried about falling? : (!) yes (Cautious when walking.)  2 or more falls in past year?: no  Fall with injury in past year?: no   Fall Risk Interventions:    Home safety tips provided  Home exercises provided to promote strength and balance  Patient declines any further evaluation/treatment for this issue Depression:  PHQ-2 Score: 2  PHQ-9 Total Score: 11    Severity:1-4 = minimal depression, 5-9 = mild depression, 10-14 = moderate depression, 15-19 = moderately severe depression, 20-27 = severe depression  Depression Interventions:  LPN INTERVENTION GUIDE: SCORE 5-14 = MODERATE DEPRESSION: FOLLOW UP IN 1 WEEK  Regular exercise recommended- 3-5 times per week, 30-45 minutes per session  Relaxation techniques discussed  Pt is to F/U with Dr. Juan C Whitley on the medication increase on 10/04/2022. She does feel it is somewhat better. She denies any suicidal thoughts or plans. She contracts for safety and is to notify the office if this changes.        Tobacco Use:  Tobacco Use: High Risk    Smoking Tobacco Use: Every Day    Smokeless Tobacco Use: Never     E-cigarette/Vaping       Questions Responses    E-cigarette/Vaping Use     Start Date     Passive Exposure     Quit Date     Counseling Given     Comments           Substance Use - Tobacco Interventions:  tobacco cessation tips and resources provided, patient is not ready to work toward tobacco cessation at this time         General Health and ACP:  General  In general, how would you say your health is?: Fair (On Oxygen all the time 2L/min/nc)  In the past 7 days, have you experienced any of the following: New or Increased Pain, New or Increased Fatigue, Loneliness, Social Isolation, Stress or Anger?: No  Do you get the social and emotional support that you need?: (!) No  Do you have a Living Will?: (!) No    Advance Directives       Power of  Living Will ACP-Advance Directive ACP-Power of     Not on File Not on File Not on File Not on File          General Health Risk Interventions:  No Living Will: [address] addressed with patient today    Health Habits/Nutrition:  Physical Activity: Inactive    Days of Exercise per Week: 0 days    Minutes of Exercise per Session: 0 min     Have you lost any weight without trying in the past 3 months?: (!) Yes (about 10 lbs)     Have you seen the dentist within the past year?: (!) No  Health Habits/Nutrition Interventions:  Inadequate physical activity:  educational materials provided to promote increased physical activity, patient is not ready to increase his/her physical activity level at this time  Nutritional issues:  educational materials for healthy, well-balanced diet provided, patient is not ready to address his/her nutritional/weight issues at this time  Dental exam overdue:  patient encouraged to make appointment with his/her dentist    Hearing/Vision:  Do you or your family notice any trouble with your hearing that hasn't been managed with hearing aids?: (!) Yes  Do you have difficulty driving, watching TV, or doing any of your daily activities because of your eyesight?: (!) Yes (does not drive at night.)  Have you had an eye exam within the past year?: (!) No  No results found.   Hearing/Vision Interventions:  Hearing concerns:  patient declines any further evaluation/treatment for hearing issues  Vision concerns:  patient encouraged to make appointment with his/her eye specialist    Safety:  Do you have working smoke detectors?: Yes  Do you have any tripping hazards - loose or unsecured carpets or rugs?: No  Do you have any tripping hazards - clutter in doorways, halls, or stairs?: No  Do you have either shower bars, grab bars, non-slip mats or non-slip surfaces in your shower or bathtub?: (!) No  Do all of your stairways have a railing or banister?: Yes  Do you always fasten your seatbelt when you are in a car?: Yes  Safety Interventions:  Home safety tips provided  Patient declines any further evaluation/treatment for this issue    ADLs:  In the past 7 days, did you need help from others to perform any of the following everyday activities: Eating, dressing, grooming, bathing, toileting, or walking/balance?: No  In the past 7 days, did you need help from others to take care of any of the following: Laundry, housekeeping, banking/finances, shopping, telephone use, food preparation, transportation, or taking medications?: (!) Yes  Select all that apply: Chastity Wilder, Housekeeping, Transportation, Food Preparation  ADL Interventions:  Patient declines any further evaluation/treatment for this issue          Objective      Patient-Reported Vitals  No data recorded     Recent Memory is intact. Remote memory is not tested at this time due to the VV per phone. Allergies   Allergen Reactions    Levaquin [Levofloxacin In D5w] Anaphylaxis    Noroxin [Norfloxacin] Shortness Of Breath    Tuberculin Ppd Other (See Comments)     Reacts positive to TB skin tests    Prozac [Fluoxetine Hcl]      Prior to Visit Medications    Medication Sig Taking? Authorizing Provider   gabapentin (NEURONTIN) 300 MG capsule Take 1 capsule by mouth 3 times daily for 30 days.  Yes SURINDER Bansal DO   metoprolol succinate (TOPROL XL) 25 MG extended release tablet Take 1 tablet by mouth daily Yes SURINDER Bansal DO   omeprazole (PRILOSEC) 40 MG delayed release capsule Take 1 capsule by mouth daily Yes SURINDER Bansal DO   venlafaxine (EFFEXOR XR) 75 MG extended release capsule Take 3 capsules by mouth daily Yes SURINDER Bansal DO   albuterol sulfate (PROAIR RESPICLICK) 706 (90 Base) MCG/ACT aerosol powder inhalation Inhale 2 puffs into the lungs every 6 hours as needed for Wheezing Yes SURINDER Bansal DO   Semaglutide,0.25 or 0.5MG/DOS, (OZEMPIC, 0.25 OR 0.5 MG/DOSE,) 2 MG/1.5ML SOPN Inject 0.25 mg into the skin once a week Yes SURINDER Bansal DO   empagliflozin (JARDIANCE) 10 MG tablet Take 1 tablet by mouth daily Yes SURINDER Bansal DO   rosuvastatin (CRESTOR) 20 MG tablet Take 1 tablet by mouth daily Yes SURINDER Bansal DO   tiotropium-olodaterol (STIOLTO RESPIMAT) 2.5-2.5 MCG/ACT AERS Inhale 2 puffs into the lungs daily Yes SURINDER Bansal DO   furosemide (LASIX) 20 MG tablet TAKE ONE TABLET BY MOUTH DAILY AS NEEDED FOR SWELLING Yes Tolu Nick MD   Insulin Pen Needle 32G X 4 MM MISC 1 each by Does not apply route daily Yes Tolu Nick MD   glucose monitoring kit (FREESTYLE) monitoring kit 1 kit by Does not apply route 3 times daily Yes Tolu Nick MD   cloNIDine (CATAPRES) 0.1 MG tablet Take 1 tablet by mouth 2 times daily  Patient taking differently: Take 0.1 mg by mouth 2 times daily as needed Yes Tolu Nick MD   nitroGLYCERIN (NITROSTAT) 0.4 MG SL tablet Place 1 tablet under the tongue every 5 minutes as needed for Chest pain up to max of 3 total doses. If no relief after 1 dose, call 911. B Floyde Plana, DO   silver sulfADIAZINE (SILVADENE) 1 % cream Apply topically daily. B Floyde Plana, DO   Omega-3 Fatty Acids (FISH OIL) 1000 MG CAPS Take 1 capsule by mouth 2 times daily  Patient not taking: Reported on 9/8/2022  Tolu Nick MD       [facility] (Including outside providers/suppliers regularly involved in providing care):   Patient Care Team:  Tricia Hsieh DO as PCP - General (Pediatrics)  Tricia Hsieh DO as PCP - [facility] Empaneled Provider  MAGDALENA Todd as Nurse Practitioner (Certified Nurse Practitioner)     Reviewed and updated this visit:  Allergies  Meds       Lab work is ordered from her previous visit. I have scheduled a Pap and Pelvic, Mammogram and CT Scan Lung for her to be done on October 13, 2022. Pt will come in and get the Prevnar when she can get a ride. Health Maintenance is reviewed and updated with the patient. May Lucian, was evaluated through a synchronous (real-time) audio-video encounter. The patient (or guardian if applicable) is aware that this is a billable service, which includes applicable co-pays. This Virtual Visit was conducted with patient's (and/or legal guardian's) consent.  The visit was conducted pursuant to the emergency declaration under the [facility], [facility]5 waiver authority and the Conrad Haload and [email or web address] General Act. Patient identification was verified, and a caregiver was present when appropriate. The patient was located at Home: [address] [place] [address] [address] [facility]. Provider was located at [facility] ([place] [address]): [facility]  [place],  [address] Arnold LPN, [birth year], performed the documented evaluation under the direct supervision of the attending physician. This encounter was performed under RASHAD sidhu DOs, direct supervision, 9/8/2022.

## 2022-10-04 DIAGNOSIS — E78.2 MIXED HYPERLIPIDEMIA: ICD-10-CM

## 2022-10-04 DIAGNOSIS — I10 PRIMARY HYPERTENSION: ICD-10-CM

## 2022-10-04 DIAGNOSIS — Z11.59 ENCOUNTER FOR HEPATITIS C SCREENING TEST FOR LOW RISK PATIENT: ICD-10-CM

## 2022-10-04 DIAGNOSIS — E11.9 TYPE 2 DIABETES MELLITUS WITHOUT COMPLICATION, WITH LONG-TERM CURRENT USE OF INSULIN (HCC): ICD-10-CM

## 2022-10-04 DIAGNOSIS — Z79.4 TYPE 2 DIABETES MELLITUS WITHOUT COMPLICATION, WITH LONG-TERM CURRENT USE OF INSULIN (HCC): ICD-10-CM

## 2022-10-04 DIAGNOSIS — I25.10 CORONARY ARTERY DISEASE INVOLVING NATIVE CORONARY ARTERY OF NATIVE HEART WITHOUT ANGINA PECTORIS: ICD-10-CM

## 2022-10-04 DIAGNOSIS — Z11.4 SCREENING FOR HIV (HUMAN IMMUNODEFICIENCY VIRUS): ICD-10-CM

## 2022-10-04 DIAGNOSIS — R53.83 FATIGUE, UNSPECIFIED TYPE: ICD-10-CM

## 2022-10-04 LAB
ALBUMIN SERPL-MCNC: 3.8 G/DL (ref 3.5–5.2)
ALP BLD-CCNC: 87 U/L (ref 35–104)
ALT SERPL-CCNC: 7 U/L (ref 5–33)
ANION GAP SERPL CALCULATED.3IONS-SCNC: 12 MMOL/L (ref 7–19)
AST SERPL-CCNC: 14 U/L (ref 5–32)
BASOPHILS ABSOLUTE: 0.1 K/UL (ref 0–0.2)
BASOPHILS RELATIVE PERCENT: 0.5 % (ref 0–1)
BILIRUB SERPL-MCNC: 0.3 MG/DL (ref 0.2–1.2)
BUN BLDV-MCNC: 4 MG/DL (ref 8–23)
CALCIUM SERPL-MCNC: 8.9 MG/DL (ref 8.8–10.2)
CHLORIDE BLD-SCNC: 93 MMOL/L (ref 98–111)
CO2: 29 MMOL/L (ref 22–29)
CREAT SERPL-MCNC: 0.7 MG/DL (ref 0.5–0.9)
EOSINOPHILS ABSOLUTE: 0 K/UL (ref 0–0.6)
EOSINOPHILS RELATIVE PERCENT: 0 % (ref 0–5)
GFR AFRICAN AMERICAN: >59
GFR NON-AFRICAN AMERICAN: >60
GLUCOSE BLD-MCNC: 209 MG/DL (ref 74–109)
HBA1C MFR BLD: 10.1 % (ref 4–6)
HCT VFR BLD CALC: 44.1 % (ref 37–47)
HEMOGLOBIN: 13.5 G/DL (ref 12–16)
HEPATITIS C ANTIBODY INTERPRETATION: NORMAL
IMMATURE GRANULOCYTES #: 0.1 K/UL
LYMPHOCYTES ABSOLUTE: 3 K/UL (ref 1.1–4.5)
LYMPHOCYTES RELATIVE PERCENT: 26.4 % (ref 20–40)
MCH RBC QN AUTO: 27 PG (ref 27–31)
MCHC RBC AUTO-ENTMCNC: 30.6 G/DL (ref 33–37)
MCV RBC AUTO: 88.2 FL (ref 81–99)
MONOCYTES ABSOLUTE: 0.8 K/UL (ref 0–0.9)
MONOCYTES RELATIVE PERCENT: 6.7 % (ref 0–10)
NEUTROPHILS ABSOLUTE: 7.4 K/UL (ref 1.5–7.5)
NEUTROPHILS RELATIVE PERCENT: 66 % (ref 50–65)
PDW BLD-RTO: 13.8 % (ref 11.5–14.5)
PLATELET # BLD: 333 K/UL (ref 130–400)
PMV BLD AUTO: 10.7 FL (ref 9.4–12.3)
POTASSIUM SERPL-SCNC: 3.6 MMOL/L (ref 3.5–5)
RBC # BLD: 5 M/UL (ref 4.2–5.4)
SARS-COV-2, PCR: NOT DETECTED
SODIUM BLD-SCNC: 134 MMOL/L (ref 136–145)
T4 FREE: 1.25 NG/DL (ref 0.93–1.7)
TOTAL PROTEIN: 7.3 G/DL (ref 6.6–8.7)
TSH SERPL DL<=0.05 MIU/L-ACNC: 1.87 UIU/ML (ref 0.27–4.2)
WBC # BLD: 11.3 K/UL (ref 4.8–10.8)

## 2022-10-05 ENCOUNTER — TELEPHONE (OUTPATIENT)
Dept: PRIMARY CARE CLINIC | Age: 62
End: 2022-10-05

## 2022-10-05 DIAGNOSIS — Z79.4 TYPE 2 DIABETES MELLITUS WITHOUT COMPLICATION, WITH LONG-TERM CURRENT USE OF INSULIN (HCC): ICD-10-CM

## 2022-10-05 DIAGNOSIS — E11.9 TYPE 2 DIABETES MELLITUS WITHOUT COMPLICATION, WITH LONG-TERM CURRENT USE OF INSULIN (HCC): ICD-10-CM

## 2022-10-05 LAB
CHOLESTEROL, TOTAL: 250 MG/DL (ref 160–199)
CREATININE URINE: 51.8 MG/DL (ref 4.2–622)
HDLC SERPL-MCNC: 46 MG/DL (ref 65–121)
HIV-1 P24 AG: NORMAL
LDL CHOLESTEROL CALCULATED: 158 MG/DL
MICROALBUMIN UR-MCNC: <1.2 MG/DL (ref 0–19)
MICROALBUMIN/CREAT UR-RTO: NORMAL MG/G
RAPID HIV 1&2: NORMAL
TRIGL SERPL-MCNC: 229 MG/DL (ref 0–149)

## 2022-10-05 NOTE — TELEPHONE ENCOUNTER
Message from [address] [facility],[address], DO sent at 10/4/2022  7:10 PM CDT -----  Metabolic profile is stable. Blood sugar was 209 at the time of the lab draw. Hepatitis C is nonreactive, meaning normal.  Mild elevation of the white blood cell count. Me know if you are having any symptoms of infection. Message from MAGDALENA Warren sent at 10/5/2022 12:47 PM CDT -----  Please call patient and let them know results. No pathologic protein in urine.

## 2022-10-05 NOTE — TELEPHONE ENCOUNTER
----- Message from [address] [facility],[address], DO sent at 10/5/2022  8:21 AM CDT -----  Cholesterol is not well controlled. Recommend increase rosuvastatin to 40 mg nightly. Recheck lipids and ALT in 4 to 6 weeks. HIV is nonreactive, meaning normal.  Hemoglobin A1c is 10.1 which indicates very poor control of blood sugars. Need to get this under much better control. Recommend increasing Ozempic to 0.5 mg subcu weekly. We can also increase Jardiance to 25 mg daily. Also recommend adding once a day insulin that you would administer at bedtime. Toujeo, 24 units at bedtime, nightly. Dispense 1 pen. Also will need needles ordered.     Thyroid values are normal.

## 2022-10-05 NOTE — TELEPHONE ENCOUNTER
----- Message from [address] [facility],[address], DO sent at 10/5/2022  8:21 AM CDT -----  Cholesterol is not well controlled. Recommend increase rosuvastatin to 40 mg nightly. Recheck lipids and ALT in 4 to 6 weeks. HIV is nonreactive, meaning normal.  Hemoglobin A1c is 10.1 which indicates very poor control of blood sugars. Need to get this under much better control. Recommend increasing Ozempic to 0.5 mg subcu weekly. We can also increase Jardiance to 25 mg daily. Also recommend adding once a day insulin that you would administer at bedtime. Toujeo, 24 units at bedtime, nightly. Dispense 1 pen. Also will need needles ordered.     Thyroid values are normal.

## 2022-10-05 NOTE — TELEPHONE ENCOUNTER
----- Message from [address] [facility],[address], DO sent at 10/4/2022  7:10 PM CDT -----  Metabolic profile is stable. Blood sugar was 209 at the time of the lab draw. Hepatitis C is nonreactive, meaning normal.  Mild elevation of the white blood cell count. Me know if you are having any symptoms of infection.

## 2022-10-05 NOTE — TELEPHONE ENCOUNTER
----- Message from MAGDALENA Urrutia sent at 10/5/2022 12:47 PM CDT -----  Please call patient and let them know results. No pathologic protein in urine.

## 2022-10-05 NOTE — TELEPHONE ENCOUNTER
----- Message from [address] [facility],[address], DO sent at 10/5/2022 12:49 PM CDT -----  There is no significant protein excretion in urine.

## 2022-10-07 RX ORDER — SEMAGLUTIDE 1.34 MG/ML
0.5 INJECTION, SOLUTION SUBCUTANEOUS WEEKLY
Qty: 1 ADJUSTABLE DOSE PRE-FILLED PEN SYRINGE | Refills: 3 | Status: CANCELLED | OUTPATIENT
Start: 2022-10-07

## 2022-10-07 RX ORDER — ROSUVASTATIN CALCIUM 40 MG/1
40 TABLET, COATED ORAL DAILY
Qty: 90 TABLET | Refills: 1 | Status: CANCELLED | OUTPATIENT
Start: 2022-10-07

## 2022-10-07 RX ORDER — INSULIN GLARGINE 300 U/ML
24 INJECTION, SOLUTION SUBCUTANEOUS NIGHTLY
Qty: 1 ADJUSTABLE DOSE PRE-FILLED PEN SYRINGE | Refills: 3 | Status: CANCELLED | OUTPATIENT
Start: 2022-10-07

## 2022-10-27 ENCOUNTER — TELEPHONE (OUTPATIENT)
Dept: PRIMARY CARE CLINIC | Age: 62
End: 2022-10-27

## 2022-10-27 DIAGNOSIS — N76.0 ACUTE VAGINITIS: Primary | ICD-10-CM

## 2022-10-27 RX ORDER — FLUCONAZOLE 150 MG/1
150 TABLET ORAL ONCE
Qty: 1 TABLET | Refills: 0 | Status: SHIPPED | OUTPATIENT
Start: 2022-10-27 | End: 2022-10-27

## 2022-10-27 NOTE — TELEPHONE ENCOUNTER
Tried calling pt back to let her know that rx was sent to the pharmacy. Also I am going to write letter for pt. Just need to know if this needs to be mailed to pt or if she needs to pick this up or fax it somewhere and will need the fax number. No answer, No vm. Will try again tomorrow.

## 2022-10-27 NOTE — LETTER
Letter of Medical Necessity    10/28/2022        RE: Agnes Watson 15 [address] [facility]    This letter is being provided to support the medical necessity of Eneida De La Cruz moving to a lower floor apartment. Mars Matthews has multiple health issues that may make it somewhat difficult to get to an upstairs apartment. Consequently, we feel that it is medically necessary as well as beneficial for Mars Matthews to move to a lower floor apartment as soon as this can be arranged. Should you have any questions or concerns, please feel free to contact my office at [phone].     Sincerely      MAGDALENA Ha

## 2022-10-27 NOTE — TELEPHONE ENCOUNTER
Pt called requesting rx for yeast infection. She is also requesting a paper be faxed to the office where she lives in order to get her an apartment on the ground floor. Pt states she had this once before but she can't find it. Will send to provider for authorization.

## 2022-10-27 NOTE — TELEPHONE ENCOUNTER
[address] for diflucan, I sent to pharmacy. It is ok to write letter for lower floor appt if possible due to multiple health issues.

## 2022-10-28 NOTE — TELEPHONE ENCOUNTER
Tried calling pt again to see where she needed this letter sent. I will just type this out and mail it to pt.

## 2022-12-15 ENCOUNTER — PRIOR AUTHORIZATION (OUTPATIENT)
Dept: INTERNAL MEDICINE | Facility: CLINIC | Age: 62
End: 2022-12-15

## 2022-12-15 NOTE — TELEPHONE ENCOUNTER
CHERELLE BOO (Geiger: [id number])  Xultophy 100/3.6 Units-mg/mL     Form  [employer] Medicare Part D Electronic Prior Authorization Form (2017 NCPDP)  Created  5 days ago  Sent to Plan  2 days ago  Plan Response  2 days ago  Submit Clinical Questions  less than a minute ago  Determination

## 2022-12-15 NOTE — TELEPHONE ENCOUNTER
Xultophy 100/3.6 Units-mg/mL    (Key: [id number])    Message from Plan  Request Reference Number: [id number]. XULTOPHY /3.6 is approved through 12/31/2023. Your patient may now fill this prescription and it will be covered.

## 2022-12-29 RX ORDER — PERPHENAZINE 16 MG/1
TABLET, FILM COATED ORAL
Qty: 100 STRIP | Refills: 11 | OUTPATIENT
Start: 2022-12-29

## 2023-01-18 DIAGNOSIS — E11.42 DIABETIC POLYNEUROPATHY ASSOCIATED WITH TYPE 2 DIABETES MELLITUS (HCC): ICD-10-CM

## 2023-01-18 RX ORDER — GABAPENTIN 300 MG/1
300 CAPSULE ORAL 3 TIMES DAILY
Qty: 90 CAPSULE | Refills: 5 | Status: SHIPPED | OUTPATIENT
Start: 2023-01-18 | End: 2023-02-17

## 2023-02-08 ENCOUNTER — TELEPHONE (OUTPATIENT)
Dept: FAMILY MEDICINE CLINIC | Age: 63
End: 2023-02-08

## 2023-02-08 NOTE — TELEPHONE ENCOUNTER
She should not be taking xultophy  We can increase Ozempic to 0.5 mg weekly, and if she is still running high we can increase this further to 1 mg weekly as tolerated.

## 2023-02-08 NOTE — TELEPHONE ENCOUNTER
We have no indication that she is on a continuous glucose monitor. Please schedule an appointment so we can review her medications and get this cleared up.

## 2023-02-08 NOTE — TELEPHONE ENCOUNTER
Pt called stating that her blood sugar running high. Taking glipizide BID, Jardiance daily, Ozempic weekly  and  Xultophy nigthly. Her blood sugar was 255 this morning This was not fasting, this was after drinking a coke, but it was 465 last night. Checking blood sugar TID. States that fingers are so sore from this. She said that she can't get us to send rx for her sensors for her sugar monitor. I advised pt to call pharmacy and have them request it for her so that we can see what it is. I do not show that on pts med list.     Will send this to provider for recommendations on blood sugar.

## 2023-02-11 DIAGNOSIS — E11.40 TYPE 2 DIABETES MELLITUS WITH DIABETIC NEUROPATHY, WITH LONG-TERM CURRENT USE OF INSULIN: ICD-10-CM

## 2023-02-11 DIAGNOSIS — Z79.4 TYPE 2 DIABETES MELLITUS WITH DIABETIC NEUROPATHY, WITH LONG-TERM CURRENT USE OF INSULIN: ICD-10-CM

## 2023-02-11 RX ORDER — FLASH GLUCOSE SENSOR
KIT MISCELLANEOUS
Qty: 2 EACH | Refills: 5 | OUTPATIENT
Start: 2023-02-11

## 2023-02-13 ENCOUNTER — TELEPHONE (OUTPATIENT)
Dept: FAMILY MEDICINE CLINIC | Age: 63
End: 2023-02-13

## 2023-02-13 DIAGNOSIS — Z79.4 TYPE 2 DIABETES MELLITUS WITH HYPERGLYCEMIA, WITH LONG-TERM CURRENT USE OF INSULIN (HCC): Primary | ICD-10-CM

## 2023-02-13 DIAGNOSIS — E11.65 TYPE 2 DIABETES MELLITUS WITH HYPERGLYCEMIA, WITH LONG-TERM CURRENT USE OF INSULIN (HCC): Primary | ICD-10-CM

## 2023-02-13 RX ORDER — FLASH GLUCOSE SENSOR
KIT MISCELLANEOUS
Qty: 1 EACH | Refills: 5 | Status: SHIPPED | OUTPATIENT
Start: 2023-02-13

## 2023-02-13 NOTE — TELEPHONE ENCOUNTER
Received fax from pharmacy requesting refill on pts medication(s). Pt was last seen in office on 9/17/2020  and has a follow up scheduled for 3/20/2023. Will send request to  Dr. Vania Dougherty  for authorization.      Requested Prescriptions     Pending Prescriptions Disp Refills    Continuous Blood Gluc Sensor (FREESTYLE DAVID 14 DAY SENSOR) MISC [Pharmacy Med Name: FREESTYLE DAVID 14 DAY SENS Miscellaneous]  5     Sig: REPLACE EVERY 14 DAYS

## 2023-02-16 ENCOUNTER — TELEPHONE (OUTPATIENT)
Dept: FAMILY MEDICINE CLINIC | Age: 63
End: 2023-02-16

## 2023-02-16 NOTE — TELEPHONE ENCOUNTER
Pt called stating she is out of refills on her Gabapentin but I let her know It looks like she still has refills but if not give us a call back

## 2023-03-06 DIAGNOSIS — Z79.4 TYPE 2 DIABETES MELLITUS WITH DIABETIC NEUROPATHY, WITH LONG-TERM CURRENT USE OF INSULIN: ICD-10-CM

## 2023-03-06 DIAGNOSIS — E11.40 TYPE 2 DIABETES MELLITUS WITH DIABETIC NEUROPATHY, WITH LONG-TERM CURRENT USE OF INSULIN: ICD-10-CM

## 2023-03-06 RX ORDER — GLIPIZIDE 5 MG/1
TABLET ORAL
Qty: 180 TABLET | Refills: 3 | OUTPATIENT
Start: 2023-03-06

## 2023-03-06 RX ORDER — GLIPIZIDE 5 MG/1
5 TABLET ORAL
Qty: 180 TABLET | Refills: 3 | Status: SHIPPED | OUTPATIENT
Start: 2023-03-06

## 2023-03-06 NOTE — TELEPHONE ENCOUNTER
Received fax from pharmacy requesting refill on pts medication(s). Pt was last seen in office on 9/17/2020  and has a follow up scheduled for 3/20/2023. Will send request to  Dr. Joy Gotti  for authorization.      Requested Prescriptions     Pending Prescriptions Disp Refills    glipiZIDE (GLUCOTROL) 5 MG tablet [Pharmacy Med Name: GLIPIZIDE 5 MG TAB 5 Tablet] 180 tablet 3     Sig: Take 1 tablet by mouth 2 times daily (before meals)

## 2023-03-24 RX ORDER — (INSULIN DEGLUDEC AND LIRAGLUTIDE) 100; 3.6 [IU]/ML; MG/ML
26 INJECTION, SOLUTION SUBCUTANEOUS DAILY
Qty: 15 ML | Refills: 5 | Status: SHIPPED | OUTPATIENT
Start: 2023-03-24

## 2023-03-24 NOTE — TELEPHONE ENCOUNTER
Sent rx to pharmacy for pt    Requested Prescriptions     Signed Prescriptions Disp Refills    Insulin Degludec-Liraglutide (XULTOPHY) 100-3.6 UNIT-MG/ML SOPN injection pen 15 mL 5     Sig: Inject 26 Units into the skin daily     Authorizing Provider: Jin Kumar     Ordering User: Jonna Preciado

## 2023-03-24 NOTE — TELEPHONE ENCOUNTER
Received fax from pharmacy requesting refill on pts medication(s). Pt was last seen in office on 09/08/2022  and has a follow up scheduled for Visit date not found. Will send request to  Dr. Demetrius Dejesus  for authorization.      Requested Prescriptions     Pending Prescriptions Disp Refills    Insulin Degludec-Liraglutide (XULTOPHY) 100-3.6 UNIT-MG/ML SOPN injection pen [Pharmacy Med Name: Eren Garcia 100 UNIT-3.6MG/-3.6 Solution Pen-injector] 15 mL 5     Sig: Inject 26 Units into the skin daily

## 2023-03-27 ENCOUNTER — TELEPHONE (OUTPATIENT)
Dept: FAMILY MEDICINE CLINIC | Age: 63
End: 2023-03-27

## 2023-03-27 NOTE — TELEPHONE ENCOUNTER
Received a denial from [address] on pts Xultophy. This medication cannot be approved because     I will send this to provider for further recommendations.

## 2023-04-10 DIAGNOSIS — Z79.4 TYPE 2 DIABETES MELLITUS WITH HYPERGLYCEMIA, WITH LONG-TERM CURRENT USE OF INSULIN (HCC): ICD-10-CM

## 2023-04-10 DIAGNOSIS — E11.65 TYPE 2 DIABETES MELLITUS WITH HYPERGLYCEMIA, WITH LONG-TERM CURRENT USE OF INSULIN (HCC): ICD-10-CM

## 2023-04-10 LAB — HBA1C MFR BLD: 9.5 % (ref 4–6)

## 2023-05-01 ENCOUNTER — TELEMEDICINE (OUTPATIENT)
Dept: FAMILY MEDICINE CLINIC | Age: 63
End: 2023-05-01
Payer: MEDICARE

## 2023-05-01 DIAGNOSIS — I25.10 CORONARY ARTERY DISEASE INVOLVING NATIVE CORONARY ARTERY OF NATIVE HEART WITHOUT ANGINA PECTORIS: ICD-10-CM

## 2023-05-01 DIAGNOSIS — Z79.4 TYPE 2 DIABETES MELLITUS WITHOUT COMPLICATION, WITH LONG-TERM CURRENT USE OF INSULIN (HCC): Primary | ICD-10-CM

## 2023-05-01 DIAGNOSIS — E78.2 MIXED HYPERLIPIDEMIA: ICD-10-CM

## 2023-05-01 DIAGNOSIS — K21.9 GASTROESOPHAGEAL REFLUX DISEASE, UNSPECIFIED WHETHER ESOPHAGITIS PRESENT: ICD-10-CM

## 2023-05-01 DIAGNOSIS — F33.41 RECURRENT MAJOR DEPRESSIVE DISORDER, IN PARTIAL REMISSION (HCC): ICD-10-CM

## 2023-05-01 DIAGNOSIS — F17.219 CIGARETTE NICOTINE DEPENDENCE WITH NICOTINE-INDUCED DISORDER: ICD-10-CM

## 2023-05-01 DIAGNOSIS — R53.83 FATIGUE, UNSPECIFIED TYPE: ICD-10-CM

## 2023-05-01 DIAGNOSIS — I10 PRIMARY HYPERTENSION: ICD-10-CM

## 2023-05-01 DIAGNOSIS — E11.9 TYPE 2 DIABETES MELLITUS WITHOUT COMPLICATION, WITH LONG-TERM CURRENT USE OF INSULIN (HCC): Primary | ICD-10-CM

## 2023-05-01 DIAGNOSIS — J44.1 COPD WITH ACUTE EXACERBATION (HCC): ICD-10-CM

## 2023-05-01 PROCEDURE — 99406 BEHAV CHNG SMOKING 3-10 MIN: CPT | Performed by: PEDIATRICS

## 2023-05-01 PROCEDURE — 3046F HEMOGLOBIN A1C LEVEL >9.0%: CPT | Performed by: PEDIATRICS

## 2023-05-01 PROCEDURE — 99214 OFFICE O/P EST MOD 30 MIN: CPT | Performed by: PEDIATRICS

## 2023-05-01 ASSESSMENT — ENCOUNTER SYMPTOMS
EYES NEGATIVE: 1
SHORTNESS OF BREATH: 1
ALLERGIC/IMMUNOLOGIC NEGATIVE: 1
COUGH: 1
GASTROINTESTINAL NEGATIVE: 1

## 2023-05-01 NOTE — PROGRESS NOTES
the max dose. vitals were not taken for this visit. There is no height or weight on file to calculate BMI. I have reviewed the following with the Ms. Collins   Lab Review  Orders Only on 04/10/2023   Component Date Value    Hemoglobin A1C 04/10/2023 9.5 (H)      Copies of these are in the chart. Current Outpatient Medications   Medication Sig Dispense Refill    empagliflozin (JARDIANCE) 25 MG tablet Take 1 tablet by mouth daily 30 tablet 5    Insulin Degludec-Liraglutide (XULTOPHY) 100-3.6 UNIT-MG/ML SOPN injection pen Inject 26 Units into the skin daily 15 mL 5    glipiZIDE (GLUCOTROL) 5 MG tablet Take 1 tablet by mouth 2 times daily (before meals) 180 tablet 3    Continuous Blood Gluc Sensor (FREESTYLE DAVID 14 DAY SENSOR) MISC Sig as directed 1 each 5    gabapentin (NEURONTIN) 300 MG capsule Take 1 capsule by mouth 3 times daily for 30 days. Max Daily Amount: 900 mg 90 capsule 5    nitroGLYCERIN (NITROSTAT) 0.4 MG SL tablet Place 1 tablet under the tongue every 5 minutes as needed for Chest pain up to max of 3 total doses. If no relief after 1 dose, call 911. 25 tablet 1    silver sulfADIAZINE (SILVADENE) 1 % cream Apply topically daily.  400 g 0    metoprolol succinate (TOPROL XL) 25 MG extended release tablet Take 1 tablet by mouth daily 90 tablet 3    omeprazole (PRILOSEC) 40 MG delayed release capsule Take 1 capsule by mouth daily 90 capsule 3    venlafaxine (EFFEXOR XR) 75 MG extended release capsule Take 3 capsules by mouth daily 90 capsule 11    albuterol sulfate (PROAIR RESPICLICK) 372 (90 Base) MCG/ACT aerosol powder inhalation Inhale 2 puffs into the lungs every 6 hours as needed for Wheezing 3 each 3    tiotropium-olodaterol (STIOLTO RESPIMAT) 2.5-2.5 MCG/ACT AERS Inhale 2 puffs into the lungs daily 1 each 11    furosemide (LASIX) 20 MG tablet TAKE ONE TABLET BY MOUTH DAILY AS NEEDED FOR SWELLING 30 tablet 2    Omega-3 Fatty Acids (FISH OIL) 1000 MG CAPS Take 1 capsule by mouth 2 times

## 2023-05-03 RX ORDER — FLASH GLUCOSE SENSOR
KIT MISCELLANEOUS
Qty: 1 EACH | Refills: 5 | Status: SHIPPED | OUTPATIENT
Start: 2023-05-03

## 2023-05-03 NOTE — TELEPHONE ENCOUNTER
Received fax from pharmacy requesting refill on pts medication(s). Pt was last seen in office on 5/1/2023  and has a follow up scheduled for Visit date not found. Will send request to  Dr. Susan Mccullough  for authorization.      Requested Prescriptions     Pending Prescriptions Disp Refills    Continuous Blood Gluc Sensor (FREESTYLE DAVID 14 DAY SENSOR) MISC [Pharmacy Med Name: FREESTYLE DAVID 14 DAY SENS Miscellaneous] 1 each 5     Sig: USE AS DIRECTED EVERY 14 DAYS

## 2023-06-05 DIAGNOSIS — Z79.4 TYPE 2 DIABETES MELLITUS WITHOUT COMPLICATION, WITH LONG-TERM CURRENT USE OF INSULIN (HCC): ICD-10-CM

## 2023-06-05 DIAGNOSIS — E11.9 TYPE 2 DIABETES MELLITUS WITHOUT COMPLICATION, WITH LONG-TERM CURRENT USE OF INSULIN (HCC): ICD-10-CM

## 2023-06-05 RX ORDER — SEMAGLUTIDE 0.68 MG/ML
INJECTION, SOLUTION SUBCUTANEOUS
Qty: 1.5 ML | Refills: 11 | OUTPATIENT
Start: 2023-06-05

## 2023-06-29 DIAGNOSIS — E78.2 MIXED HYPERLIPIDEMIA: ICD-10-CM

## 2023-06-29 DIAGNOSIS — J44.9 CHRONIC OBSTRUCTIVE PULMONARY DISEASE, UNSPECIFIED COPD TYPE (HCC): ICD-10-CM

## 2023-06-29 DIAGNOSIS — F32.A ANXIETY AND DEPRESSION: ICD-10-CM

## 2023-06-29 DIAGNOSIS — Z79.4 TYPE 2 DIABETES MELLITUS WITHOUT COMPLICATION, WITH LONG-TERM CURRENT USE OF INSULIN (HCC): ICD-10-CM

## 2023-06-29 DIAGNOSIS — E11.9 TYPE 2 DIABETES MELLITUS WITHOUT COMPLICATION, WITH LONG-TERM CURRENT USE OF INSULIN (HCC): ICD-10-CM

## 2023-06-29 DIAGNOSIS — F41.9 ANXIETY AND DEPRESSION: ICD-10-CM

## 2023-06-29 RX ORDER — ROSUVASTATIN CALCIUM 20 MG/1
20 TABLET, COATED ORAL DAILY
Qty: 30 TABLET | Refills: 11 | Status: SHIPPED | OUTPATIENT
Start: 2023-06-29

## 2023-06-29 RX ORDER — ALBUTEROL SULFATE 90 UG/1
2 POWDER, METERED RESPIRATORY (INHALATION) EVERY 6 HOURS PRN
Qty: 3 EACH | Refills: 11 | Status: SHIPPED | OUTPATIENT
Start: 2023-06-29

## 2023-06-29 RX ORDER — VENLAFAXINE HYDROCHLORIDE 75 MG/1
225 CAPSULE, EXTENDED RELEASE ORAL DAILY
Qty: 90 CAPSULE | Refills: 11 | Status: SHIPPED | OUTPATIENT
Start: 2023-06-29

## 2023-06-29 RX ORDER — SEMAGLUTIDE 0.68 MG/ML
INJECTION, SOLUTION SUBCUTANEOUS
Qty: 1.5 ML | Refills: 11 | Status: SHIPPED | OUTPATIENT
Start: 2023-06-29

## 2023-07-05 DIAGNOSIS — E11.42 DIABETIC POLYNEUROPATHY ASSOCIATED WITH TYPE 2 DIABETES MELLITUS (HCC): ICD-10-CM

## 2023-07-05 RX ORDER — GABAPENTIN 300 MG/1
300 CAPSULE ORAL 3 TIMES DAILY
Qty: 90 CAPSULE | Refills: 5 | Status: SHIPPED | OUTPATIENT
Start: 2023-07-05 | End: 2024-01-01

## 2023-07-05 NOTE — TELEPHONE ENCOUNTER
Received fax from pharmacy requesting refill on pts medication(s). Pt was last seen in office on 5/1/2023  and has a follow up scheduled for Visit date not found. Will send request to  Dr. Monie Kapoor  for authorization. Requested Prescriptions     Pending Prescriptions Disp Refills    gabapentin (NEURONTIN) 300 MG capsule [Pharmacy Med Name: gabapentin 300 mg capsule] 90 capsule 5     Sig: Take 1 capsule by mouth 3 times daily for 180 days.  Max Daily Amount: 900 mg

## 2023-07-07 DIAGNOSIS — Z79.4 TYPE 2 DIABETES MELLITUS WITHOUT COMPLICATION, WITH LONG-TERM CURRENT USE OF INSULIN (HCC): Primary | ICD-10-CM

## 2023-07-07 DIAGNOSIS — E11.9 TYPE 2 DIABETES MELLITUS WITHOUT COMPLICATION, WITH LONG-TERM CURRENT USE OF INSULIN (HCC): Primary | ICD-10-CM

## 2023-07-07 RX ORDER — LANCETS 30 GAUGE
1 EACH MISCELLANEOUS DAILY
Qty: 100 EACH | Refills: 11 | Status: SHIPPED | OUTPATIENT
Start: 2023-07-07

## 2023-07-07 RX ORDER — GLUCOSAMINE HCL/CHONDROITIN SU 500-400 MG
CAPSULE ORAL
Qty: 100 STRIP | Refills: 11 | Status: SHIPPED | OUTPATIENT
Start: 2023-07-07

## 2023-07-18 ENCOUNTER — TELEPHONE (OUTPATIENT)
Dept: FAMILY MEDICINE CLINIC | Age: 63
End: 2023-07-18

## 2023-07-18 NOTE — TELEPHONE ENCOUNTER
----- Message from Willy Corea sent at 7/18/2023 12:47 PM CDT -----  Subject: Message to Provider    QUESTIONS  Information for Provider? Pt would like a letter from the PCP stating that   Vitor Lewis is her caregiver. Please call pt to advise.   ---------------------------------------------------------------------------  --------------  Vitaliy BAUER  [phone]; OK to leave message on voicemail  ---------------------------------------------------------------------------  --------------  SCRIPT ANSWERS  Relationship to Patient?  Self

## 2023-07-19 NOTE — TELEPHONE ENCOUNTER
Left msg on pts vm to call office back to discuss letter.  Need to know what letter is for and if there is any legal documents stating that she is the caregiver

## 2023-07-21 NOTE — TELEPHONE ENCOUNTER
Pt had called and LM.  I tried to call her back and had to [facility] for her

## 2023-07-24 NOTE — TELEPHONE ENCOUNTER
Pt called back. She is needing the letter to say that Suzie Lopez is her caregiver. She lives in the same apartment complex as pt and has been helping take care of her for 3 years now. Pt is starting to require more care with cooking and cleaning and they are needing this to give the landlord so that she can just move in with her and it won't affect her rent. Will send to provider for authorization to write letter. Pt would like for us to call Roselyn Jama back on her line at [phone].

## 2023-07-25 NOTE — TELEPHONE ENCOUNTER
Letter is written and put at the  for pt. Called pts care giver to let them know this is ready to be picked up.

## 2023-07-25 NOTE — TELEPHONE ENCOUNTER
We can put together a letter noting that \"patient states. Fran Number Fran Number \" As mentioned above

## 2023-07-28 DIAGNOSIS — K21.9 GASTROESOPHAGEAL REFLUX DISEASE, UNSPECIFIED WHETHER ESOPHAGITIS PRESENT: ICD-10-CM

## 2023-07-28 RX ORDER — OMEPRAZOLE 40 MG/1
40 CAPSULE, DELAYED RELEASE ORAL DAILY
Qty: 90 CAPSULE | Refills: 3 | Status: SHIPPED | OUTPATIENT
Start: 2023-07-28

## 2023-07-28 NOTE — TELEPHONE ENCOUNTER
Received fax from pharmacy requesting refill on pts medication(s). Pt was last seen in office on 5/1/2023  and has a follow up scheduled for 8/9/2023. Will send request to  Dr. Monie Kapoor  for authorization.      Requested Prescriptions     Pending Prescriptions Disp Refills    omeprazole (PRILOSEC) 40 MG delayed release capsule [Pharmacy Med Name: omeprazole 40 mg capsule,delayed release] 90 capsule 3     Sig: Take 1 capsule by mouth daily

## 2023-08-22 ENCOUNTER — TELEPHONE (OUTPATIENT)
Dept: FAMILY MEDICINE CLINIC | Age: 63
End: 2023-08-22

## 2023-08-25 NOTE — TELEPHONE ENCOUNTER
Called and LM again to see what she is needing.  That she has refills on Proair and Stilito at [facility].

## 2023-09-07 ENCOUNTER — TELEPHONE (OUTPATIENT)
Dept: FAMILY MEDICINE CLINIC | Age: 63
End: 2023-09-07

## 2023-09-22 DIAGNOSIS — Z12.31 ENCOUNTER FOR SCREENING MAMMOGRAM FOR MALIGNANT NEOPLASM OF BREAST: Primary | ICD-10-CM

## 2023-09-22 DIAGNOSIS — Z79.4 TYPE 2 DIABETES MELLITUS WITHOUT COMPLICATION, WITH LONG-TERM CURRENT USE OF INSULIN (HCC): ICD-10-CM

## 2023-09-22 DIAGNOSIS — E11.9 TYPE 2 DIABETES MELLITUS WITHOUT COMPLICATION, WITH LONG-TERM CURRENT USE OF INSULIN (HCC): ICD-10-CM

## 2023-09-22 DIAGNOSIS — I25.10 CORONARY ARTERY DISEASE INVOLVING NATIVE CORONARY ARTERY OF NATIVE HEART WITHOUT ANGINA PECTORIS: ICD-10-CM

## 2023-09-22 RX ORDER — EMPAGLIFLOZIN 25 MG/1
25 TABLET, FILM COATED ORAL DAILY
Qty: 30 TABLET | Refills: 5 | Status: SHIPPED | OUTPATIENT
Start: 2023-09-22

## 2023-09-22 NOTE — TELEPHONE ENCOUNTER
Received fax from pharmacy requesting refill on pts medication(s). Pt was last seen in office on 5/1/2023  and has a follow up scheduled for 10/10/2023. Will send request to  Dr. William Tai  for authorization.      Requested Prescriptions     Pending Prescriptions Disp Refills    JARDIANCE 25 MG tablet [Pharmacy Med Name: Jardiance 25 mg tablet] 30 tablet 5     Sig: TAKE ONE TABLET BY MOUTH DAILY

## 2023-11-06 RX ORDER — FLASH GLUCOSE SENSOR
KIT MISCELLANEOUS
Refills: 5 | OUTPATIENT
Start: 2023-11-06

## 2023-12-04 RX ORDER — FLASH GLUCOSE SENSOR
KIT MISCELLANEOUS
Refills: 5 | OUTPATIENT
Start: 2023-12-04

## 2023-12-04 RX ORDER — GLIPIZIDE 5 MG/1
5 TABLET ORAL
Qty: 180 TABLET | Refills: 3 | OUTPATIENT
Start: 2023-12-04

## 2024-01-29 DIAGNOSIS — E11.42 DIABETIC POLYNEUROPATHY ASSOCIATED WITH TYPE 2 DIABETES MELLITUS (HCC): ICD-10-CM

## 2024-01-29 RX ORDER — GABAPENTIN 300 MG/1
300 CAPSULE ORAL 3 TIMES DAILY
Qty: 90 CAPSULE | Refills: 5 | OUTPATIENT
Start: 2024-01-29

## 2024-02-02 ENCOUNTER — TELEPHONE (OUTPATIENT)
Dept: FAMILY MEDICINE CLINIC | Facility: CLINIC | Age: 64
End: 2024-02-02

## 2024-02-02 NOTE — TELEPHONE ENCOUNTER
I'm sorry. The call was put in for Dr. Pena. Patient has not been seen here in over a year. No one from our officce called her.

## 2024-02-02 NOTE — TELEPHONE ENCOUNTER
Caller: Nora Carney    Relationship: Self    Best call back number: [phone]     What is the best time to reach you: ANYTIME    Who are you requesting to speak with (clinical staff, provider,  specific staff member): CLINICAL    What was the call regarding: PATIENT STATES SHE HAD A MISSED CALL FROM OFFICE AND IS UNSURE OF WHAT IT WAS FOR.

## 2024-02-29 ENCOUNTER — OFFICE VISIT (OUTPATIENT)
Dept: FAMILY MEDICINE CLINIC | Facility: CLINIC | Age: 64
End: 2024-02-29
Payer: MEDICARE

## 2024-02-29 VITALS
TEMPERATURE: 98.6 F | RESPIRATION RATE: 18 BRPM | SYSTOLIC BLOOD PRESSURE: 159 MMHG | WEIGHT: 187 LBS | HEART RATE: 82 BPM | BODY MASS INDEX: 31.16 KG/M2 | HEIGHT: 65 IN | DIASTOLIC BLOOD PRESSURE: 84 MMHG | OXYGEN SATURATION: 99 %

## 2024-02-29 DIAGNOSIS — Z51.81 THERAPEUTIC DRUG MONITORING: ICD-10-CM

## 2024-02-29 DIAGNOSIS — Z12.31 BREAST CANCER SCREENING BY MAMMOGRAM: ICD-10-CM

## 2024-02-29 DIAGNOSIS — Z12.4 CERVICAL CANCER SCREENING: ICD-10-CM

## 2024-02-29 DIAGNOSIS — E11.65 TYPE 2 DIABETES MELLITUS WITH HYPERGLYCEMIA, WITH LONG-TERM CURRENT USE OF INSULIN: Primary | ICD-10-CM

## 2024-02-29 DIAGNOSIS — I25.10 CORONARY ARTERY DISEASE INVOLVING NATIVE CORONARY ARTERY OF NATIVE HEART WITHOUT ANGINA PECTORIS: ICD-10-CM

## 2024-02-29 DIAGNOSIS — Z78.0 POSTMENOPAUSAL: ICD-10-CM

## 2024-02-29 DIAGNOSIS — Z99.81 CHRONIC HYPOXIC RESPIRATORY FAILURE, ON HOME OXYGEN THERAPY: ICD-10-CM

## 2024-02-29 DIAGNOSIS — G89.29 BILATERAL CHRONIC KNEE PAIN: ICD-10-CM

## 2024-02-29 DIAGNOSIS — F33.1 MODERATE EPISODE OF RECURRENT MAJOR DEPRESSIVE DISORDER: ICD-10-CM

## 2024-02-29 DIAGNOSIS — J96.11 CHRONIC HYPOXIC RESPIRATORY FAILURE, ON HOME OXYGEN THERAPY: ICD-10-CM

## 2024-02-29 DIAGNOSIS — G89.29 CHRONIC LEFT HIP PAIN: ICD-10-CM

## 2024-02-29 DIAGNOSIS — F17.200 SMOKER: ICD-10-CM

## 2024-02-29 DIAGNOSIS — I10 PRIMARY HYPERTENSION: ICD-10-CM

## 2024-02-29 DIAGNOSIS — Z87.891 PERSONAL HISTORY OF NICOTINE DEPENDENCE: ICD-10-CM

## 2024-02-29 DIAGNOSIS — F41.9 ANXIETY AND DEPRESSION: ICD-10-CM

## 2024-02-29 DIAGNOSIS — Z95.1 HX OF CABG: ICD-10-CM

## 2024-02-29 DIAGNOSIS — E53.8 VITAMIN B12 DEFICIENCY: ICD-10-CM

## 2024-02-29 DIAGNOSIS — M25.552 CHRONIC LEFT HIP PAIN: ICD-10-CM

## 2024-02-29 DIAGNOSIS — E78.2 MIXED HYPERLIPIDEMIA: ICD-10-CM

## 2024-02-29 DIAGNOSIS — M25.561 BILATERAL CHRONIC KNEE PAIN: ICD-10-CM

## 2024-02-29 DIAGNOSIS — Z12.11 COLON CANCER SCREENING: ICD-10-CM

## 2024-02-29 DIAGNOSIS — Z79.4 TYPE 2 DIABETES MELLITUS WITH HYPERGLYCEMIA, WITH LONG-TERM CURRENT USE OF INSULIN: Primary | ICD-10-CM

## 2024-02-29 DIAGNOSIS — F32.A ANXIETY AND DEPRESSION: ICD-10-CM

## 2024-02-29 DIAGNOSIS — M25.562 BILATERAL CHRONIC KNEE PAIN: ICD-10-CM

## 2024-02-29 DIAGNOSIS — Z91.09 ENVIRONMENTAL ALLERGIES: ICD-10-CM

## 2024-02-29 RX ORDER — PROCHLORPERAZINE 25 MG/1
SUPPOSITORY RECTAL
Qty: 3 EACH | Refills: 5 | Status: SHIPPED | OUTPATIENT
Start: 2024-02-29

## 2024-02-29 RX ORDER — BUDESONIDE AND FORMOTEROL FUMARATE DIHYDRATE 160; 4.5 UG/1; UG/1
2 AEROSOL RESPIRATORY (INHALATION)
Qty: 10.2 G | Refills: 12 | Status: SHIPPED | OUTPATIENT
Start: 2024-02-29

## 2024-02-29 RX ORDER — INSULIN DETEMIR 100 [IU]/ML
15 INJECTION, SOLUTION SUBCUTANEOUS NIGHTLY
Qty: 15 ML | Refills: 0 | Status: SHIPPED | OUTPATIENT
Start: 2024-02-29

## 2024-02-29 RX ORDER — LISINOPRIL 5 MG/1
5 TABLET ORAL DAILY
Qty: 90 TABLET | Refills: 0 | Status: SHIPPED | OUTPATIENT
Start: 2024-02-29

## 2024-02-29 RX ORDER — CLONIDINE HYDROCHLORIDE 0.1 MG/1
0.1 TABLET ORAL DAILY PRN
Start: 2024-02-29

## 2024-02-29 RX ORDER — MONTELUKAST SODIUM 10 MG/1
10 TABLET ORAL NIGHTLY
Qty: 90 TABLET | Refills: 0 | Status: SHIPPED | OUTPATIENT
Start: 2024-02-29

## 2024-02-29 RX ORDER — ATORVASTATIN CALCIUM 20 MG/1
20 TABLET, FILM COATED ORAL DAILY
Qty: 90 TABLET | Refills: 0 | Status: SHIPPED | OUTPATIENT
Start: 2024-02-29

## 2024-02-29 RX ORDER — ALBUTEROL SULFATE 90 UG/1
1 AEROSOL, METERED RESPIRATORY (INHALATION) EVERY 4 HOURS PRN
Qty: 18 G | Refills: 0 | Status: SHIPPED | OUTPATIENT
Start: 2024-02-29

## 2024-02-29 RX ORDER — BLOOD-GLUCOSE,RECEIVER,CONT
1 EACH MISCELLANEOUS DAILY
Qty: 1 EACH | Refills: 0 | Status: SHIPPED | OUTPATIENT
Start: 2024-02-29

## 2024-02-29 RX ORDER — CARVEDILOL 6.25 MG/1
6.25 TABLET ORAL 2 TIMES DAILY WITH MEALS
Qty: 180 TABLET | Refills: 0 | Status: SHIPPED | OUTPATIENT
Start: 2024-02-29

## 2024-02-29 RX ORDER — GLIPIZIDE 5 MG/1
5 TABLET ORAL DAILY
Qty: 90 TABLET | Refills: 0 | Status: SHIPPED | OUTPATIENT
Start: 2024-02-29

## 2024-02-29 RX ORDER — VENLAFAXINE HYDROCHLORIDE 75 MG/1
225 CAPSULE, EXTENDED RELEASE ORAL DAILY
Qty: 270 CAPSULE | Refills: 0 | Status: SHIPPED | OUTPATIENT
Start: 2024-02-29

## 2024-02-29 RX ORDER — FLUTICASONE PROPIONATE 50 MCG
2 SPRAY, SUSPENSION (ML) NASAL DAILY
Qty: 9.9 ML | Refills: 0 | Status: SHIPPED | OUTPATIENT
Start: 2024-02-29

## 2024-02-29 NOTE — PROGRESS NOTES
Subjective   Nora Carney is a 63 y.o. female.     History of Present Illness     The patient presents to the office to establish care.    The patient has been seeing Dr. Luz and before that, Dr. Chaudhari. She is a type 2 diabetic. Her last A1c was elevated about a year ago. She has a continuous glucose monitor, but she is not using it. It has been about 6 months since she has been seen. She was released from Dr. Luz because she missed 3 appointments. The patient no longer takes Rybelsus. She takes glipizide 5 mg in the morning and Jardiance 25 mg in the evening. She was on Ozempic for a couple of months, but she kept missing the doses. She uses insulin at 20 to 22 units in the evening.     The patient takes Effexor 225 mg for depression and anxiety. She started taking Effexor when she had a triple bypass in 2009. She has not had a heart attack in the past. The patient has an appointment with her cardiologist on 03/20/2024. She takes Coreg 6.25 mg 2 times a day. She takes clonidine and Lasix as needed. She used to be on lisinopril, but she is no longer taking it because her blood pressure was going too low. The patient's blood pressure today was 159/84 mmHg. Her heart rate was 82 bpm. She does not take a cholesterol lowering medication. She also does not take a baby aspirin daily.    The patient fell about 2 years ago and her whole hip was black and blue. She never went to the hospital or a doctor. The patient has pain in her tailbone that radiates down her leg. She could not move her leg when she got out of bed. It hurts all the time. Her knees are also bothering her. She has not been to physical therapy. She was given hydrocodone.    The patient uses Ventolin inhaler at least 3 to 4 times a day. She also uses oxygen mainly at night. The patient has sleep apnea and was diagnosed with COPD years ago. She would like to get portable oxygen. The patient would like to resume taking her allergy medication.  "She uses Flonase nasal spray.    The patient has not had an eye exam in the past year. Her last mammogram was several years ago. She would like to go to an OBGYN for a Pap smear. She had a DEXA scan 10 years ago. Her last colonoscopy was 12 years ago.    She smokes 1 pack of cigarettes a day. She has been a smoker for 46 years.    She denies any family history or personal history of thyroid cancer. She is unsure if she has had pancreatitis.      The following portions of the patient's history were reviewed and updated as appropriate: allergies, current medications, past family history, past medical history, past social history, past surgical history, and problem list.        A review of systems was performed, and pertinent findings are noted in the HPI.    Objective   /84 (BP Location: Right arm, Patient Position: Sitting, Cuff Size: Adult)   Pulse 82   Temp 98.6 °F (37 °C) (Infrared)   Resp 18   Ht 165.1 cm (65\") Comment: per patient  Wt 84.8 kg (187 lb)   SpO2 99%   BMI 31.12 kg/m²    BMI is >= 30 and <35. (Class 1 Obesity). The following options were offered after discussion;: exercise counseling/recommendations and nutrition counseling/recommendations     Physical Exam  Vitals and nursing note reviewed.   Constitutional:       General: She is not in acute distress.     Appearance: She is well-developed. She is obese. She is not diaphoretic.   HENT:      Head: Normocephalic and atraumatic.      Right Ear: External ear normal.      Left Ear: External ear normal.      Nose: Nose normal.   Eyes:      General:         Right eye: No discharge.         Left eye: No discharge.      Conjunctiva/sclera: Conjunctivae normal.   Neck:      Thyroid: No thyromegaly.      Trachea: No tracheal deviation.   Cardiovascular:      Rate and Rhythm: Normal rate.      Pulses: Normal pulses.   Pulmonary:      Effort: Pulmonary effort is normal. No respiratory distress.      Breath sounds: Normal breath sounds. No stridor. " No wheezing.   Chest:      Chest wall: No tenderness.   Abdominal:      General: There is no distension.      Palpations: Abdomen is soft.      Tenderness: There is no abdominal tenderness.   Musculoskeletal:         General: Tenderness present.      Cervical back: Normal range of motion.   Lymphadenopathy:      Cervical: No cervical adenopathy.   Skin:     General: Skin is warm and dry.   Neurological:      Mental Status: She is alert and oriented to person, place, and time.      Motor: No abnormal muscle tone.      Coordination: Coordination normal.      Gait: Gait abnormal.   Psychiatric:         Behavior: Behavior normal.         Thought Content: Thought content normal.         Judgment: Judgment normal.         Assessment & Plan   Problems Addressed this Visit          Allergies and Adverse Reactions    Environmental allergies    Relevant Medications    fluticasone (Flonase) 50 MCG/ACT nasal spray    montelukast (Singulair) 10 MG tablet       Cardiac and Vasculature    Hx of CABG    Relevant Medications    carvedilol (COREG) 6.25 MG tablet    lisinopril (PRINIVIL,ZESTRIL) 5 MG tablet    atorvastatin (LIPITOR) 20 MG tablet    Other Relevant Orders    Lipid panel    Coronary artery disease involving native coronary artery of native heart without angina pectoris    Relevant Medications    carvedilol (COREG) 6.25 MG tablet    lisinopril (PRINIVIL,ZESTRIL) 5 MG tablet    atorvastatin (LIPITOR) 20 MG tablet    Other Relevant Orders    Lipid panel    Primary hypertension    Relevant Medications    carvedilol (COREG) 6.25 MG tablet    cloNIDine (CATAPRES) 0.1 MG tablet    lisinopril (PRINIVIL,ZESTRIL) 5 MG tablet    Mixed hyperlipidemia    Relevant Medications    atorvastatin (LIPITOR) 20 MG tablet       Endocrine and Metabolic    Type 2 diabetes mellitus with hyperglycemia, with long-term current use of insulin - Primary    Relevant Medications    empagliflozin (Jardiance) 25 MG tablet tablet    glipizide (GLUCOTROL)  5 MG tablet    Tirzepatide (MOUNJARO) 2.5 MG/0.5ML solution pen-injector pen    Continuous Blood Gluc Sensor (Dexcom G6 Sensor)    Continuous Blood Gluc  (Dexcom G4 Plat Ped ) device    Continuous Blood Gluc Transmit misc    insulin detemir (Levemir) 100 UNIT/ML injection    Other Relevant Orders    Comprehensive metabolic panel    Hemoglobin A1c    CBC No Differential    Ambulatory Referral for Diabetic Eye Exam-Optometry    Vitamin B12 deficiency    Relevant Orders    Vitamin B12       Genitourinary and Reproductive     Postmenopausal    Relevant Orders    DEXA Bone Density Axial (Completed)       Mental Health    Anxiety and depression    Relevant Medications    venlafaxine XR (EFFEXOR-XR) 75 MG 24 hr capsule    Moderate episode of recurrent major depressive disorder    Relevant Medications    venlafaxine XR (EFFEXOR-XR) 75 MG 24 hr capsule       Musculoskeletal and Injuries    Chronic left hip pain    Relevant Orders    XR Hip With or Without Pelvis 2 - 3 View Left (Completed)    XR Spine Lumbar 2 or 3 View (In Office) (Completed)    Ambulatory Referral to Orthopedic Surgery    Ambulatory Referral to Pain Management    Bilateral chronic knee pain    Relevant Orders    XR Knee 1 or 2 View Bilateral (In Office) (Completed)    Ambulatory Referral to Orthopedic Surgery    Ambulatory Referral to Pain Management       Tobacco    Smoker    Relevant Orders    CT Chest Low Dose Wo       Other    Chronic hypoxic respiratory failure, on home oxygen therapy    Relevant Medications    budesonide-formoterol (Symbicort) 160-4.5 MCG/ACT inhaler    Ventolin  (90 Base) MCG/ACT inhaler     Other Visit Diagnoses       Breast cancer screening by mammogram        Relevant Orders    Mammo Screening Digital Tomosynthesis Bilateral With CAD    Cervical cancer screening        Relevant Orders    Ambulatory Referral to Obstetrics / Gynecology    Colon cancer screening        Relevant Orders    Ambulatory Referral to  Gastroenterology    Personal history of nicotine dependence        Relevant Orders    CT Chest Low Dose Wo    Therapeutic drug monitoring        Relevant Orders    ToxASSURE Select 13 (MW) - Urine, Clean Catch          Diagnoses         Codes Comments    Type 2 diabetes mellitus with hyperglycemia, with long-term current use of insulin    -  Primary ICD-10-CM: E11.65, Z79.4  ICD-9-CM: 250.00, 790.29, V58.67     Anxiety and depression     ICD-10-CM: F41.9, F32.A  ICD-9-CM: 300.00, 311     Hx of CABG     ICD-10-CM: Z95.1  ICD-9-CM: V45.81     Coronary artery disease involving native coronary artery of native heart without angina pectoris     ICD-10-CM: I25.10  ICD-9-CM: 414.01     Primary hypertension     ICD-10-CM: I10  ICD-9-CM: 401.9     Mixed hyperlipidemia     ICD-10-CM: E78.2  ICD-9-CM: 272.2     Moderate episode of recurrent major depressive disorder     ICD-10-CM: F33.1  ICD-9-CM: 296.32     Smoker     ICD-10-CM: F17.200  ICD-9-CM: 305.1     Chronic hypoxic respiratory failure, on home oxygen therapy     ICD-10-CM: J96.11, Z99.81  ICD-9-CM: 518.83, 799.02, V46.2     Chronic left hip pain     ICD-10-CM: M25.552, G89.29  ICD-9-CM: 719.45, 338.29     Bilateral chronic knee pain     ICD-10-CM: M25.561, M25.562, G89.29  ICD-9-CM: 719.46, 338.29     Environmental allergies     ICD-10-CM: Z91.09  ICD-9-CM: V15.09     Breast cancer screening by mammogram     ICD-10-CM: Z12.31  ICD-9-CM: V76.12     Cervical cancer screening     ICD-10-CM: Z12.4  ICD-9-CM: V76.2     Postmenopausal     ICD-10-CM: Z78.0  ICD-9-CM: V49.81     Colon cancer screening     ICD-10-CM: Z12.11  ICD-9-CM: V76.51     Vitamin B12 deficiency     ICD-10-CM: E53.8  ICD-9-CM: 266.2     Personal history of nicotine dependence     ICD-10-CM: Z87.891  ICD-9-CM: V15.82     Therapeutic drug monitoring     ICD-10-CM: Z51.81  ICD-9-CM: V58.83             1. Diabetes mellitus: New to me, chronic for the patient, currently uncontrolled with hyperglycemia.   We  reviewed her most recent hemoglobin A1c. The patient has not been compliant with her existing medications. We will start the patient on new prescriptions. We will see her back in 1 month for reevaluation.    2. Obesity: Body mass index is 31.12 kg/m².     3. Chronic back and knee pain: Currently uncontrolled.   I advised the patient I will not be writing for narcotic pain medication. I will place a referral to pain management if the patient would like to see them. We will also plan to do x-rays while in the office today. If the patient is cleared by cardiology to take an NSAID, I would be agreeable to starting the patient on an NSAID at her next follow-up appointment.    4. Chronic hypoxic respiratory failure:   The patient is on home oxygen. She is interested in getting portable oxygen. We will look into options with her existing supplier to get her portable oxygen for assistance.    5. Hypertension: Chronic, uncontrolled.   The patient quit taking her lisinopril. She uses clonidine on an as needed basis. We will restart her lisinopril at 5 mg daily. We will reevaluate in 1 month to see if blood pressure medication needs to be increased.    6. CAD status post CABG:   The patient is scheduled to see cardiology next month. I encouraged her to keep that appointment. The patient is not currently taking an ACE, an aspirin, or a statin. However, she is taking a beta blocker. We will continue her existing beta-blocker. We renewed that prescription. I advised the patient she should take a daily baby aspirin. We will start her on a cholesterol lowering medication of a statin. I will add an ACE inhibitor with lisinopril. New prescription sent.    7. Vitamin B12 deficiency: Chronic.   The patient is interested in vitamin B12 injections. We will check her levels today and, if low, can consider a vitamin B12 injection at her next appointment.    8. Anxiety and depression: Chronic for the patient.   Effexor has controlled this.  I will give her a new prescription for this.    9. Smoker: Nora Carney  reports that she has been smoking cigarettes. She has a 45.00 pack-year smoking history. She has never used smokeless tobacco.. I have educated her on the risk of diseases from using tobacco products such as cancer, COPD, and heart disease.     I advised her to quit and she is not willing to quit.    I spent 3  minutes counseling the patient.      We will get a CT scan of her chest.    10. Screening:   The patient is overdue for her Pap smear, mammogram, and DEXA scan. Referral placed to OBGYN. Order placed for mammogram. She will obtain a DEXA scan while in the office. We will update fasting screening lab testing.    The patient will return in 1 month for reevaluation.     Greater than 60 minutes spent in discussion with pt in regards to history and current needs, as well as review of chart and coordination of care.     Transcribed from ambient dictation for Shira Pena MD by Sanjuana Church.  02/29/24   13:35 CST    Patient or patient representative verbalized consent to the visit recording.  I have personally performed the services described in this document as transcribed by the above individual, and it is both accurate and complete.          This document has been electronically signed by Shira Pena MD on February 29, 2024 17:22 CST

## 2024-03-06 DIAGNOSIS — Z12.31 BREAST CANCER SCREENING BY MAMMOGRAM: ICD-10-CM

## 2024-03-07 DIAGNOSIS — R92.8 ABNORMAL MAMMOGRAM OF RIGHT BREAST: Primary | ICD-10-CM

## 2024-03-08 LAB — DRUGS UR: NORMAL

## 2024-03-13 ENCOUNTER — TELEPHONE (OUTPATIENT)
Dept: FAMILY MEDICINE CLINIC | Facility: CLINIC | Age: 64
End: 2024-03-13
Payer: MEDICARE

## 2024-03-13 DIAGNOSIS — J96.01 ACUTE RESPIRATORY FAILURE WITH HYPOXIA: Primary | ICD-10-CM

## 2024-03-13 NOTE — TELEPHONE ENCOUNTER
Spoke with [facility]. They need a new order for portable oxygen. Ordered placed per PCP and faxed.

## 2024-03-13 NOTE — TELEPHONE ENCOUNTER
----- Message from Shira Pena MD sent at 2/29/2024 12:09 PM CST -----  Pt wants to get portable oxygen - please see if [facility] can offer this - that is where she get her home oxygen

## 2024-03-14 DIAGNOSIS — Z79.4 TYPE 2 DIABETES MELLITUS WITH HYPERGLYCEMIA, WITH LONG-TERM CURRENT USE OF INSULIN: ICD-10-CM

## 2024-03-14 DIAGNOSIS — E11.65 TYPE 2 DIABETES MELLITUS WITH HYPERGLYCEMIA, WITH LONG-TERM CURRENT USE OF INSULIN: ICD-10-CM

## 2024-03-14 DIAGNOSIS — Z99.81 CHRONIC HYPOXIC RESPIRATORY FAILURE, ON HOME OXYGEN THERAPY: ICD-10-CM

## 2024-03-14 DIAGNOSIS — J96.11 CHRONIC HYPOXIC RESPIRATORY FAILURE, ON HOME OXYGEN THERAPY: ICD-10-CM

## 2024-03-14 RX ORDER — ALBUTEROL SULFATE 90 UG/1
AEROSOL, METERED RESPIRATORY (INHALATION)
Qty: 18 G | Refills: 0 | OUTPATIENT
Start: 2024-03-14

## 2024-03-14 RX ORDER — TIRZEPATIDE 2.5 MG/.5ML
INJECTION, SOLUTION SUBCUTANEOUS
Qty: 2 ML | Refills: 0 | OUTPATIENT
Start: 2024-03-14

## 2024-03-29 ENCOUNTER — TELEPHONE (OUTPATIENT)
Dept: FAMILY MEDICINE CLINIC | Facility: CLINIC | Age: 64
End: 2024-03-29
Payer: MEDICARE

## 2024-03-29 NOTE — TELEPHONE ENCOUNTER
Called to let the pt know that [facility] will not see her and I need to know where to send her referral to. No answer. Says number not accepting calls.

## 2024-04-03 ENCOUNTER — TELEPHONE (OUTPATIENT)
Dept: FAMILY MEDICINE CLINIC | Facility: CLINIC | Age: 64
End: 2024-04-03
Payer: MEDICARE

## 2024-04-03 DIAGNOSIS — R92.8 ABNORMAL MAMMOGRAM OF RIGHT BREAST: ICD-10-CM

## 2024-04-03 NOTE — TELEPHONE ENCOUNTER
Spoke to the patient about coming in for fasting labs. Patient stated she will try to have someone bring her by Monday.     I also gave the patient the number to [facility] to schedule her mammogram and breast ultrasound.    I also spoke to the patient about her referrals to Ortho and Pain management. [facility] said patient needs to see pain management for her back.  Pt is unable to go to [facility] or [facility]. PT does not want to go to [facility]. Will cancel referral to Ortho and look for a pain management facility elsewhere. Pt is agreeable to going to [place] if needed.

## 2024-04-08 ENCOUNTER — TELEPHONE (OUTPATIENT)
Dept: FAMILY MEDICINE CLINIC | Facility: CLINIC | Age: 64
End: 2024-04-08
Payer: MEDICARE

## 2024-04-08 NOTE — TELEPHONE ENCOUNTER
Caller: Nora Carney    Relationship: Self    Best call back number: [phone]     What medication are you requesting: XULTOPHY 100/3.620 MG INJECTION     If a prescription is needed, what is your preferred pharmacy and phone number: [facility] [place] - [address] - [phone]  - [phone]      Additional notes: PATIENT STATES SHE IS OUT OF MEDICATION, HER OLD PCP USE TO WRITE THE ORDER WOULD LIKE IT TO BE SENT IN. SHE TAKES MEDICATION EVERY NIGHT.

## 2024-04-08 NOTE — TELEPHONE ENCOUNTER
Called pt back to notify that she is overdue for 1 month follow up- pt scheduled and will discuss at apt.

## 2024-04-11 DIAGNOSIS — E11.9 TYPE 2 DIABETES MELLITUS WITHOUT COMPLICATION, WITH LONG-TERM CURRENT USE OF INSULIN (HCC): ICD-10-CM

## 2024-04-11 DIAGNOSIS — I25.10 CORONARY ARTERY DISEASE INVOLVING NATIVE CORONARY ARTERY OF NATIVE HEART WITHOUT ANGINA PECTORIS: ICD-10-CM

## 2024-04-11 DIAGNOSIS — Z99.81 CHRONIC HYPOXIC RESPIRATORY FAILURE, ON HOME OXYGEN THERAPY: ICD-10-CM

## 2024-04-11 DIAGNOSIS — Z79.4 TYPE 2 DIABETES MELLITUS WITHOUT COMPLICATION, WITH LONG-TERM CURRENT USE OF INSULIN (HCC): ICD-10-CM

## 2024-04-11 DIAGNOSIS — J96.11 CHRONIC HYPOXIC RESPIRATORY FAILURE, ON HOME OXYGEN THERAPY: ICD-10-CM

## 2024-04-11 DIAGNOSIS — E11.65 TYPE 2 DIABETES MELLITUS WITH HYPERGLYCEMIA, WITH LONG-TERM CURRENT USE OF INSULIN: ICD-10-CM

## 2024-04-11 DIAGNOSIS — Z79.4 TYPE 2 DIABETES MELLITUS WITH HYPERGLYCEMIA, WITH LONG-TERM CURRENT USE OF INSULIN: ICD-10-CM

## 2024-04-11 RX ORDER — TIRZEPATIDE 2.5 MG/.5ML
INJECTION, SOLUTION SUBCUTANEOUS
Qty: 2 ML | Refills: 0 | Status: SHIPPED | OUTPATIENT
Start: 2024-04-11

## 2024-04-11 RX ORDER — ALBUTEROL SULFATE 90 UG/1
AEROSOL, METERED RESPIRATORY (INHALATION)
Qty: 18 G | Refills: 0 | Status: SHIPPED | OUTPATIENT
Start: 2024-04-11

## 2024-04-11 RX ORDER — EMPAGLIFLOZIN 25 MG/1
25 TABLET, FILM COATED ORAL DAILY
Qty: 30 TABLET | Refills: 5 | OUTPATIENT
Start: 2024-04-11

## 2024-04-12 ENCOUNTER — OFFICE VISIT (OUTPATIENT)
Dept: FAMILY MEDICINE CLINIC | Facility: CLINIC | Age: 64
End: 2024-04-12
Payer: MEDICARE

## 2024-04-12 VITALS
HEART RATE: 84 BPM | RESPIRATION RATE: 22 BRPM | OXYGEN SATURATION: 92 % | HEIGHT: 65 IN | TEMPERATURE: 98.7 F | DIASTOLIC BLOOD PRESSURE: 72 MMHG | BODY MASS INDEX: 30.49 KG/M2 | SYSTOLIC BLOOD PRESSURE: 174 MMHG | WEIGHT: 183 LBS

## 2024-04-12 DIAGNOSIS — R60.0 LOCALIZED EDEMA: ICD-10-CM

## 2024-04-12 DIAGNOSIS — E11.65 TYPE 2 DIABETES MELLITUS WITH HYPERGLYCEMIA, WITH LONG-TERM CURRENT USE OF INSULIN: Primary | ICD-10-CM

## 2024-04-12 DIAGNOSIS — R09.81 HEAD CONGESTION: ICD-10-CM

## 2024-04-12 DIAGNOSIS — G62.9 NEUROPATHY: ICD-10-CM

## 2024-04-12 DIAGNOSIS — Z95.1 HX OF CABG: ICD-10-CM

## 2024-04-12 DIAGNOSIS — I10 PRIMARY HYPERTENSION: ICD-10-CM

## 2024-04-12 DIAGNOSIS — Z79.4 TYPE 2 DIABETES MELLITUS WITH HYPERGLYCEMIA, WITH LONG-TERM CURRENT USE OF INSULIN: Primary | ICD-10-CM

## 2024-04-12 DIAGNOSIS — I25.10 CORONARY ARTERY DISEASE INVOLVING NATIVE CORONARY ARTERY OF NATIVE HEART WITHOUT ANGINA PECTORIS: ICD-10-CM

## 2024-04-12 PROCEDURE — G2211 COMPLEX E/M VISIT ADD ON: HCPCS | Performed by: FAMILY MEDICINE

## 2024-04-12 PROCEDURE — 99214 OFFICE O/P EST MOD 30 MIN: CPT | Performed by: FAMILY MEDICINE

## 2024-04-12 PROCEDURE — 3077F SYST BP >= 140 MM HG: CPT | Performed by: FAMILY MEDICINE

## 2024-04-12 PROCEDURE — 3078F DIAST BP <80 MM HG: CPT | Performed by: FAMILY MEDICINE

## 2024-04-12 PROCEDURE — 3046F HEMOGLOBIN A1C LEVEL >9.0%: CPT | Performed by: FAMILY MEDICINE

## 2024-04-12 RX ORDER — INSULIN DETEMIR 100 [IU]/ML
15 INJECTION, SOLUTION SUBCUTANEOUS NIGHTLY
Qty: 3 ML | Refills: 2 | Status: SHIPPED | OUTPATIENT
Start: 2024-04-12

## 2024-04-12 RX ORDER — GABAPENTIN 300 MG/1
300 CAPSULE ORAL 3 TIMES DAILY
Qty: 90 CAPSULE | Refills: 0 | Status: SHIPPED | OUTPATIENT
Start: 2024-04-12

## 2024-04-12 RX ORDER — FUROSEMIDE 40 MG/1
40 TABLET ORAL DAILY
Qty: 90 TABLET | Refills: 0 | Status: SHIPPED | OUTPATIENT
Start: 2024-04-12

## 2024-04-12 RX ORDER — AZELASTINE 1 MG/ML
2 SPRAY, METERED NASAL 2 TIMES DAILY
Qty: 30 ML | Refills: 2 | Status: SHIPPED | OUTPATIENT
Start: 2024-04-12

## 2024-04-12 RX ORDER — LISINOPRIL 20 MG/1
20 TABLET ORAL DAILY
Qty: 30 TABLET | Refills: 2 | Status: SHIPPED | OUTPATIENT
Start: 2024-04-12

## 2024-04-12 NOTE — PROGRESS NOTES
"Subjective cc: DM   Nora Carney is a 63 y.o. female.     History of Present Illness     The patient presents today for a follow-up on diabetes.    The patient has not been doing well since her last visit. Her last DEXA scan showed osteopenia. She had a screening mammogram on 03/05/2024 that showed a spot in her right breast that looked abnormal. They wanted to do a diagnostic mammogram and additional imaging; however, these have not been completed yet.    The patient's blood glucose levels have been persistently elevated. She was prescribed Xultophy insulin with syringes, which she took for a duration of 4 days. However, this resulted in severe respiratory distress, leading her to discontinue its use. She has not yet started Mounjaro. The patient has been experiencing severe headaches for the past 4 days, which she attributes to her elevated blood glucose levels. She inquired about continuing Jardiance.    The patient attributes her elevated blood pressure to not taking the lisinopril this morning.    The patient notes pain management will not see her.    She would like a nasal spray to help with her congestion.    The patient's feet were erythematous and swollen a few nights ago. She has been on gabapentin 300 mg 3 times daily for several years, prescribed by Dr. Luz. She inquired about the possibility of taking ibuprofen or Tylenol with gabapentin.    She is still smoking.    The following portions of the patient's history were reviewed and updated as appropriate: allergies, current medications, past family history, past medical history, past social history, past surgical history, and problem list.        Review of Systems    Objective   /72 (BP Location: Left arm, Patient Position: Sitting, Cuff Size: Adult)   Pulse 84   Temp 98.7 °F (37.1 °C)   Resp 22   Ht 165.1 cm (65\")   Wt 83 kg (183 lb)   SpO2 92%   BMI 30.45 kg/m²   Physical Exam  Vitals and nursing note reviewed.   Constitutional:  "      Appearance: Normal appearance. She is well-developed. She is obese.   HENT:      Right Ear: External ear normal.      Left Ear: External ear normal.      Nose: Nose normal.   Cardiovascular:      Rate and Rhythm: Normal rate and regular rhythm.      Pulses: Normal pulses.   Pulmonary:      Effort: Pulmonary effort is normal. No respiratory distress.      Breath sounds: Normal breath sounds and air entry. No wheezing.   Skin:     General: Skin is warm and dry.   Neurological:      Mental Status: She is alert and oriented to person, place, and time.      Motor: Motor function is intact. Weakness present.      Gait: Gait abnormal.   Psychiatric:         Mood and Affect: Mood normal.         Behavior: Behavior normal.         Thought Content: Thought content normal.         Judgment: Judgment normal.                 Assessment & Plan   Problems Addressed this Visit          Cardiac and Vasculature    Hx of CABG    Relevant Medications    lisinopril (PRINIVIL,ZESTRIL) 20 MG tablet    Coronary artery disease involving native coronary artery of native heart without angina pectoris    Relevant Medications    lisinopril (PRINIVIL,ZESTRIL) 20 MG tablet    Primary hypertension    Relevant Medications    furosemide (LASIX) 40 MG tablet    lisinopril (PRINIVIL,ZESTRIL) 20 MG tablet       Endocrine and Metabolic    Type 2 diabetes mellitus with hyperglycemia, with long-term current use of insulin - Primary    Relevant Medications    insulin detemir (Levemir FlexPen) 100 UNIT/ML injection     Other Visit Diagnoses       Head congestion        Relevant Medications    azelastine (ASTELIN) 0.1 % nasal spray    Localized edema        Relevant Medications    furosemide (LASIX) 40 MG tablet    Neuropathy        Relevant Medications    gabapentin (NEURONTIN) 300 MG capsule          Diagnoses         Codes Comments    Type 2 diabetes mellitus with hyperglycemia, with long-term current use of insulin    -  Primary ICD-10-CM: E11.65,  Z79.4  ICD-9-CM: 250.00, 790.29, V58.67     Head congestion     ICD-10-CM: R09.81  ICD-9-CM: 478.19     Localized edema     ICD-10-CM: R60.0  ICD-9-CM: 782.3     Hx of CABG     ICD-10-CM: Z95.1  ICD-9-CM: V45.81     Coronary artery disease involving native coronary artery of native heart without angina pectoris     ICD-10-CM: I25.10  ICD-9-CM: 414.01     Primary hypertension     ICD-10-CM: I10  ICD-9-CM: 401.9     Neuropathy     ICD-10-CM: G62.9  ICD-9-CM: 355.9           1. Diabetes Mellitus.  This is chronic and uncontrolled with hyperglycemia. We will start patient on Levemir nightly. We will have her start on her once weekly injection. The patient had her labs done today for further evaluation. We will notify the patient of the results.    2. Neuropathy.  This is chronic and uncontrolled. We will start patient on gabapentin. We will have a controlled contract signed. Urine drug screen is up to date. DINA reviewed and appropriate. Patient to return in 1 month for reevaluation.    3. Hypertension.  The patient's hypertension is chronic and uncontrolled. We will increase dose of lisinopril from 5 mg to 20 mg. The patient is to return in 1 month for reevaluation.    4. Chronic Pain Syndrome.  The patient is to contact pain management to schedule an appointment.    5. Breast Nodule.  This needs further evaluation. The patient has had a breast ultrasound and diagnostic mammogram ordered. However, this has not been scheduled. We will follow up on getting this scheduled.    The patient is scheduled for a follow-up visit in 1 month for re-evaluation.    Transcribed from ambient dictation for Shira Pena MD by Sanjuana Church.  04/12/24   16:18 CDT    Patient or patient representative verbalized consent to the visit recording.  I have personally performed the services described in this document as transcribed by the above individual, and it is both accurate and complete.          This document has been  electronically signed by Shira Pena MD on April 24, 2024 15:16 CDT

## 2024-04-13 LAB
ALBUMIN SERPL-MCNC: 4.3 G/DL (ref 3.5–5.2)
ALBUMIN/GLOB SERPL: 1.6 G/DL
ALP SERPL-CCNC: 99 U/L (ref 39–117)
ALT SERPL-CCNC: 9 U/L (ref 1–33)
AST SERPL-CCNC: 11 U/L (ref 1–32)
BILIRUB SERPL-MCNC: 0.3 MG/DL (ref 0–1.2)
BUN SERPL-MCNC: 8 MG/DL (ref 8–23)
BUN/CREAT SERPL: 7.2 (ref 7–25)
CALCIUM SERPL-MCNC: 9.3 MG/DL (ref 8.6–10.5)
CHLORIDE SERPL-SCNC: 97 MMOL/L (ref 98–107)
CHOLEST SERPL-MCNC: 219 MG/DL (ref 0–200)
CO2 SERPL-SCNC: 30.2 MMOL/L (ref 22–29)
CREAT SERPL-MCNC: 1.11 MG/DL (ref 0.57–1)
EGFRCR SERPLBLD CKD-EPI 2021: 56.7 ML/MIN/1.73
ERYTHROCYTE [DISTWIDTH] IN BLOOD BY AUTOMATED COUNT: 15.5 % (ref 12.3–15.4)
GLOBULIN SER CALC-MCNC: 2.7 GM/DL
GLUCOSE SERPL-MCNC: 311 MG/DL (ref 65–99)
HBA1C MFR BLD: 9.2 % (ref 4.8–5.6)
HCT VFR BLD AUTO: 47.9 % (ref 34–46.6)
HDLC SERPL-MCNC: 43 MG/DL (ref 40–60)
HGB BLD-MCNC: 14.1 G/DL (ref 12–15.9)
LDLC SERPL CALC-MCNC: 127 MG/DL (ref 0–100)
MCH RBC QN AUTO: 24.1 PG (ref 26.6–33)
MCHC RBC AUTO-ENTMCNC: 29.4 G/DL (ref 31.5–35.7)
MCV RBC AUTO: 81.7 FL (ref 79–97)
PLATELET # BLD AUTO: 267 10*3/MM3 (ref 140–450)
POTASSIUM SERPL-SCNC: 4 MMOL/L (ref 3.5–5.2)
PROT SERPL-MCNC: 7 G/DL (ref 6–8.5)
RBC # BLD AUTO: 5.86 10*6/MM3 (ref 3.77–5.28)
SODIUM SERPL-SCNC: 137 MMOL/L (ref 136–145)
TRIGL SERPL-MCNC: 276 MG/DL (ref 0–150)
VIT B12 SERPL-MCNC: 478 PG/ML (ref 211–946)
VLDLC SERPL CALC-MCNC: 49 MG/DL (ref 5–40)
WBC # BLD AUTO: 10.54 10*3/MM3 (ref 3.4–10.8)

## 2024-04-18 ENCOUNTER — TELEPHONE (OUTPATIENT)
Dept: FAMILY MEDICINE CLINIC | Facility: CLINIC | Age: 64
End: 2024-04-18
Payer: MEDICARE

## 2024-04-18 NOTE — TELEPHONE ENCOUNTER
Called to let the patient know I have her scheduled for her diagnostic mammogram and breast ultrasound at [facility] 4/25/24 at 2:30    No answer. [facility]    HUB ok to read but please document response and forward to sender of message

## 2024-04-18 NOTE — TELEPHONE ENCOUNTER
"Name: Nora Carney    Relationship: Self    Best Callback Number:[phone]     HUB PROVIDED THE RELAY MESSAGE FROM THE OFFICE   PATIENT VOICED UNDERSTANDING AND HAS NO FURTHER QUESTIONS AT THIS TIME    ADDITIONAL INFORMATION: HUB RELAYED SCHEDULED APPT TIME AND DATE FOR MAMMOGRAM AND ULTRASOUND, HUB STATED \"OKAY I'LL BE THERE, THANK YOU\"     "

## 2024-04-23 NOTE — TELEPHONE ENCOUNTER
Called to let the patient know I have her scheduled for her diagnostic mammogram and breast ultrasound at [facility] 4/25/24 at 2:30     No answer. [facility]     HUB ok to read but please document response and forward to sender of message

## 2024-04-23 NOTE — TELEPHONE ENCOUNTER
Patient called back she said she was already given the information for the time and day and she will be there.

## 2024-05-07 ENCOUNTER — TELEPHONE (OUTPATIENT)
Dept: FAMILY MEDICINE CLINIC | Facility: CLINIC | Age: 64
End: 2024-05-07
Payer: MEDICARE

## 2024-05-07 DIAGNOSIS — Z91.09 ENVIRONMENTAL ALLERGIES: ICD-10-CM

## 2024-05-07 DIAGNOSIS — I10 PRIMARY HYPERTENSION: ICD-10-CM

## 2024-05-07 DIAGNOSIS — Z79.4 TYPE 2 DIABETES MELLITUS WITH HYPERGLYCEMIA, WITH LONG-TERM CURRENT USE OF INSULIN: ICD-10-CM

## 2024-05-07 DIAGNOSIS — E78.2 MIXED HYPERLIPIDEMIA: ICD-10-CM

## 2024-05-07 DIAGNOSIS — Z99.81 CHRONIC HYPOXIC RESPIRATORY FAILURE, ON HOME OXYGEN THERAPY: ICD-10-CM

## 2024-05-07 DIAGNOSIS — I25.10 CORONARY ARTERY DISEASE INVOLVING NATIVE CORONARY ARTERY OF NATIVE HEART WITHOUT ANGINA PECTORIS: ICD-10-CM

## 2024-05-07 DIAGNOSIS — J96.11 CHRONIC HYPOXIC RESPIRATORY FAILURE, ON HOME OXYGEN THERAPY: ICD-10-CM

## 2024-05-07 DIAGNOSIS — E11.65 TYPE 2 DIABETES MELLITUS WITH HYPERGLYCEMIA, WITH LONG-TERM CURRENT USE OF INSULIN: ICD-10-CM

## 2024-05-07 DIAGNOSIS — Z95.1 HX OF CABG: ICD-10-CM

## 2024-05-09 DIAGNOSIS — G62.9 NEUROPATHY: ICD-10-CM

## 2024-05-09 RX ORDER — GLIPIZIDE 5 MG/1
5 TABLET ORAL DAILY
Qty: 90 TABLET | Refills: 0 | Status: SHIPPED | OUTPATIENT
Start: 2024-05-09

## 2024-05-09 RX ORDER — LISINOPRIL 5 MG/1
5 TABLET ORAL DAILY
Qty: 90 TABLET | Refills: 0 | OUTPATIENT
Start: 2024-05-09

## 2024-05-09 RX ORDER — TIRZEPATIDE 2.5 MG/.5ML
INJECTION, SOLUTION SUBCUTANEOUS
Qty: 2 ML | Refills: 0 | Status: SHIPPED | OUTPATIENT
Start: 2024-05-09

## 2024-05-09 RX ORDER — ATORVASTATIN CALCIUM 20 MG/1
20 TABLET, FILM COATED ORAL DAILY
Qty: 90 TABLET | Refills: 0 | Status: SHIPPED | OUTPATIENT
Start: 2024-05-09

## 2024-05-09 RX ORDER — MONTELUKAST SODIUM 10 MG/1
10 TABLET ORAL
Qty: 90 TABLET | Refills: 0 | Status: SHIPPED | OUTPATIENT
Start: 2024-05-09

## 2024-05-09 RX ORDER — ALBUTEROL SULFATE 90 UG/1
AEROSOL, METERED RESPIRATORY (INHALATION)
Qty: 18 G | Refills: 0 | Status: SHIPPED | OUTPATIENT
Start: 2024-05-09

## 2024-05-09 RX ORDER — CARVEDILOL 6.25 MG/1
6.25 TABLET ORAL 2 TIMES DAILY WITH MEALS
Qty: 180 TABLET | Refills: 0 | Status: SHIPPED | OUTPATIENT
Start: 2024-05-09

## 2024-05-10 RX ORDER — GABAPENTIN 300 MG/1
300 CAPSULE ORAL 3 TIMES DAILY
Qty: 90 CAPSULE | Refills: 0 | Status: SHIPPED | OUTPATIENT
Start: 2024-05-10

## 2024-05-17 ENCOUNTER — TELEMEDICINE (OUTPATIENT)
Dept: FAMILY MEDICINE CLINIC | Facility: CLINIC | Age: 64
End: 2024-05-17
Payer: MEDICARE

## 2024-05-17 ENCOUNTER — TELEPHONE (OUTPATIENT)
Dept: FAMILY MEDICINE CLINIC | Facility: CLINIC | Age: 64
End: 2024-05-17

## 2024-05-17 DIAGNOSIS — G89.29 CHRONIC LEFT HIP PAIN: ICD-10-CM

## 2024-05-17 DIAGNOSIS — I10 PRIMARY HYPERTENSION: ICD-10-CM

## 2024-05-17 DIAGNOSIS — R05.1 ACUTE COUGH: ICD-10-CM

## 2024-05-17 DIAGNOSIS — G89.29 BILATERAL CHRONIC KNEE PAIN: ICD-10-CM

## 2024-05-17 DIAGNOSIS — M25.552 CHRONIC LEFT HIP PAIN: ICD-10-CM

## 2024-05-17 DIAGNOSIS — R92.8 ABNORMAL MAMMOGRAM: Primary | ICD-10-CM

## 2024-05-17 DIAGNOSIS — E11.65 TYPE 2 DIABETES MELLITUS WITH HYPERGLYCEMIA, WITH LONG-TERM CURRENT USE OF INSULIN: ICD-10-CM

## 2024-05-17 DIAGNOSIS — Z79.4 TYPE 2 DIABETES MELLITUS WITH HYPERGLYCEMIA, WITH LONG-TERM CURRENT USE OF INSULIN: ICD-10-CM

## 2024-05-17 DIAGNOSIS — M25.562 BILATERAL CHRONIC KNEE PAIN: ICD-10-CM

## 2024-05-17 DIAGNOSIS — M25.561 BILATERAL CHRONIC KNEE PAIN: ICD-10-CM

## 2024-05-17 PROCEDURE — 1125F AMNT PAIN NOTED PAIN PRSNT: CPT | Performed by: FAMILY MEDICINE

## 2024-05-17 PROCEDURE — 99214 OFFICE O/P EST MOD 30 MIN: CPT | Performed by: FAMILY MEDICINE

## 2024-05-17 PROCEDURE — G2211 COMPLEX E/M VISIT ADD ON: HCPCS | Performed by: FAMILY MEDICINE

## 2024-05-17 PROCEDURE — 3046F HEMOGLOBIN A1C LEVEL >9.0%: CPT | Performed by: FAMILY MEDICINE

## 2024-05-17 RX ORDER — BROMPHENIRAMINE MALEATE, PSEUDOEPHEDRINE HYDROCHLORIDE, AND DEXTROMETHORPHAN HYDROBROMIDE 2; 30; 10 MG/5ML; MG/5ML; MG/5ML
10 SYRUP ORAL 4 TIMES DAILY PRN
Qty: 118 ML | Refills: 0 | Status: SHIPPED | OUTPATIENT
Start: 2024-05-17

## 2024-05-17 NOTE — PROGRESS NOTES
Subjective   Nora Carney is a 63 y.o. female.     History of Present Illness   Nora Carney is a 63-year-old female patient who presents via video visit for follow-up.    She is at home, I am in office.     At her last visit on 04/12/2024 we discussed bone density screening, as well as screening mammogram which resulted in a need for further imaging of her right breast. She unable to complete her breast imaging, which included a diagnostic mammogram and ultrasound, due to scheduling conflicts. She has a Pap smear scheduled for 05/ 29/2024.    The patient's blood pressure has been well-regulated, with readings typically ranging from around 132/68 to 132/74 mmHg. Her lisinopril was increased on her last visit from 5 mg daily to 20 mg daily. She reports the pharmacy give her 5 mg tablets so she has been taking 2 daily.     She has diabetes mellitus type 2. She recently added Levemir 15 units of insulin to her regimen. She notes that her blood glucose levels have shown improvement. She reports 3 incidents of hypoglycemia where her glucose meter woke her up alerting her of a low glucose levels. The  most recent incident was yesterday morning with a reading of 54 or 50 mg/dL. She acknowledges a lack of sufficient food intake the previous night. She was able to start the Mounjaro 2.5 mg and has administered 3 injections.     The patient is experiencing difficulties managing her pain for which a previous referral was placed to pain management. She reports that [facility] is attempting to find a pain management specialist in [place], but she has not received any communication regarding this.    Ms. Carney complains of a severe cough, which worsens at nighttime and causes headaches. She describes the cough as productive, yielding thick phlegm.      The following portions of the patient's history were reviewed and updated as appropriate: allergies, current medications, past family history, past medical history, past social  history, past surgical history, and problem list.        Review of Systems  A review of systems was performed and pertinent findings are noted in the HPI.     Objective   There were no vitals taken for this visit.  Physical Exam  HENT:      Head: Normocephalic and atraumatic.      Right Ear: External ear normal.      Left Ear: External ear normal.   Pulmonary:      Effort: Pulmonary effort is normal. No respiratory distress.   Neurological:      Mental Status: She is alert.   Psychiatric:         Mood and Affect: Mood normal.         Behavior: Behavior normal.         Thought Content: Thought content normal.         Judgment: Judgment normal.                Assessment & Plan   Problems Addressed this Visit          Cardiac and Vasculature    Primary hypertension       Endocrine and Metabolic    Type 2 diabetes mellitus with hyperglycemia, with long-term current use of insulin    Relevant Medications    Tirzepatide (MOUNJARO) 5 MG/0.5ML solution pen-injector pen       Musculoskeletal and Injuries    Chronic left hip pain    Bilateral chronic knee pain     Other Visit Diagnoses       Abnormal mammogram    -  Primary    Acute cough        Relevant Medications    brompheniramine-pseudoephedrine-DM 30-2-10 MG/5ML syrup          Diagnoses         Codes Comments    Abnormal mammogram    -  Primary ICD-10-CM: R92.8  ICD-9-CM: 793.80     Primary hypertension     ICD-10-CM: I10  ICD-9-CM: 401.9     Type 2 diabetes mellitus with hyperglycemia, with long-term current use of insulin     ICD-10-CM: E11.65, Z79.4  ICD-9-CM: 250.00, 790.29, V58.67     Chronic left hip pain     ICD-10-CM: M25.552, G89.29  ICD-9-CM: 719.45, 338.29     Bilateral chronic knee pain     ICD-10-CM: M25.561, M25.562, G89.29  ICD-9-CM: 719.46, 338.29     Acute cough     ICD-10-CM: R05.1  ICD-9-CM: 786.2           1. Hypertension.  The patient's hypertension is chronic yet showing signs of improvement. The office will verify with the pharmacy the correct  dosage of her lisinopril medication.    2. Cough.  This is a new symptom. A prescription for Bromfed has been issued. The patient is advised to return if her condition does not improve.    3. Chronic hip and knee pain.  The status of the patient's pain management referral, which was placed in 02/2024, will be evaluated.    4. Diabetes mellitus type 2.  The patient's diabetes mellitus is chronic, uncontrolled, accompanied by hyperglycemia. The patient reports experiencing 3 episodes of hypoglycemia, which she attributes to a lack of food intake on those days. The patient has been advised that if her blood sugar levels become a regular occurrence, a reduction in her insulin dosage will be necessary. Otherwise, she should continue her existing prescriptions. A new prescription for Mounjaro 5 mg has been issued. If Mounjaro is not available, the patient should inform us so that we can transition her to Ozempic or Trulicity.    5. Abnormal mammogram.  The patient has been advised to schedule her diagnostic mammogram and ultrasound.    Patient will follow up if symptoms worsen or fail to improve.    20 minutes        Answers submitted by the patient for this visit:  Primary Reason for Visit (Submitted on 5/17/2024)  What is the primary reason for your visit?: Extremity Pain  Lower Extremity Injury Questionnaire (Submitted on 5/17/2024)  Chief Complaint: Extremity pain  Injury: No          This document has been electronically signed by Shira Pena MD on May 30, 2024 14:27 CDT

## 2024-05-17 NOTE — TELEPHONE ENCOUNTER
Geraldine Glasgow comes into clinic today at the request of Martha Hendricks Ordering Provider for spirometry       This service provided today was under the supervising provider of the day Martha Hendricks, who was available if needed.    Josefa Bonilla     PA pending

## 2024-05-20 ENCOUNTER — TELEPHONE (OUTPATIENT)
Dept: FAMILY MEDICINE CLINIC | Facility: CLINIC | Age: 64
End: 2024-05-20
Payer: MEDICARE

## 2024-05-20 NOTE — TELEPHONE ENCOUNTER
Called pt regarding referral to pain management. See Mychart message and prev phone calls. No answer. LVM

## 2024-05-20 NOTE — TELEPHONE ENCOUNTER
Spoke with pharmacy. Pharmacy reports that patient picked up lisinopril 20mg tablets on 05/09/2024

## 2024-05-29 DIAGNOSIS — E11.65 TYPE 2 DIABETES MELLITUS WITH HYPERGLYCEMIA, WITH LONG-TERM CURRENT USE OF INSULIN: ICD-10-CM

## 2024-05-29 DIAGNOSIS — Z79.4 TYPE 2 DIABETES MELLITUS WITH HYPERGLYCEMIA, WITH LONG-TERM CURRENT USE OF INSULIN: ICD-10-CM

## 2024-05-30 NOTE — TELEPHONE ENCOUNTER
Rx Refill Note  Requested Prescriptions     Pending Prescriptions Disp Refills    Levemir 100 UNIT/ML injection [Pharmacy Med Name: Levemir U-100 Insulin 100 unit/mL subcutaneous solution] 20 mL 0     Sig: INJECT 15 UNITS UNDER THE SKIN NIGHTLY      Last office visit with prescribing clinician: 5/17/2024  Next office visit with prescribing clinician: Visit date not found       Anabell Polanco CMA  05/30/24, 08:13 CDT

## 2024-06-04 RX ORDER — INSULIN DETEMIR 100 [IU]/ML
INJECTION, SOLUTION SUBCUTANEOUS
Qty: 20 ML | Refills: 0 | OUTPATIENT
Start: 2024-06-04

## 2024-06-05 DIAGNOSIS — G62.9 NEUROPATHY: ICD-10-CM

## 2024-06-06 DIAGNOSIS — Z95.1 HX OF CABG: ICD-10-CM

## 2024-06-06 DIAGNOSIS — Z79.4 TYPE 2 DIABETES MELLITUS WITH HYPERGLYCEMIA, WITH LONG-TERM CURRENT USE OF INSULIN: ICD-10-CM

## 2024-06-06 DIAGNOSIS — J96.11 CHRONIC HYPOXIC RESPIRATORY FAILURE, ON HOME OXYGEN THERAPY: ICD-10-CM

## 2024-06-06 DIAGNOSIS — E11.65 TYPE 2 DIABETES MELLITUS WITH HYPERGLYCEMIA, WITH LONG-TERM CURRENT USE OF INSULIN: ICD-10-CM

## 2024-06-06 DIAGNOSIS — I10 PRIMARY HYPERTENSION: ICD-10-CM

## 2024-06-06 DIAGNOSIS — Z99.81 CHRONIC HYPOXIC RESPIRATORY FAILURE, ON HOME OXYGEN THERAPY: ICD-10-CM

## 2024-06-06 DIAGNOSIS — G62.9 NEUROPATHY: ICD-10-CM

## 2024-06-06 DIAGNOSIS — I25.10 CORONARY ARTERY DISEASE INVOLVING NATIVE CORONARY ARTERY OF NATIVE HEART WITHOUT ANGINA PECTORIS: ICD-10-CM

## 2024-06-06 RX ORDER — LISINOPRIL 20 MG/1
20 TABLET ORAL DAILY
Qty: 30 TABLET | Refills: 2 | Status: SHIPPED | OUTPATIENT
Start: 2024-06-06

## 2024-06-06 RX ORDER — TIRZEPATIDE 5 MG/.5ML
INJECTION, SOLUTION SUBCUTANEOUS
Qty: 2 ML | Refills: 0 | Status: SHIPPED | OUTPATIENT
Start: 2024-06-06

## 2024-06-06 RX ORDER — ALBUTEROL SULFATE 90 UG/1
AEROSOL, METERED RESPIRATORY (INHALATION)
Qty: 18 G | Refills: 0 | Status: SHIPPED | OUTPATIENT
Start: 2024-06-06

## 2024-06-06 RX ORDER — GABAPENTIN 300 MG/1
300 CAPSULE ORAL 3 TIMES DAILY
Qty: 90 CAPSULE | Refills: 0 | Status: SHIPPED | OUTPATIENT
Start: 2024-06-06

## 2024-06-06 NOTE — TELEPHONE ENCOUNTER
Rx Refill Note  Requested Prescriptions     Pending Prescriptions Disp Refills    gabapentin (NEURONTIN) 300 MG capsule [Pharmacy Med Name: gabapentin 300 mg capsule] 90 capsule 0     Sig: TAKE ONE CAPSULE BY MOUTH THREE TIMES DAILY      Last office visit with prescribing clinician: 4/12/2024   Next office visit with prescribing clinician: 6/6/2024     Last RF: 5/10/2024    CC: 4/12/2024    UDS: 2/29/2024      Anabell Polanco CMA  06/06/24, 12:28 CDT

## 2024-06-06 NOTE — TELEPHONE ENCOUNTER
Caller: Nora Carney KANU    Relationship: Self    Best call back number: [phone]     Requested Prescriptions:   Requested Prescriptions     Pending Prescriptions Disp Refills    gabapentin (NEURONTIN) 300 MG capsule 90 capsule 0     Sig: Take 1 capsule by mouth 3 (Three) Times a Day.    empagliflozin (Jardiance) 25 MG tablet tablet 90 tablet 0     Sig: Take 1 tablet by mouth Daily.        Pharmacy where request should be sent: [facility]  [place]  [address] - [phone]  - [phone] FX     Last office visit with prescribing clinician: 4/12/2024   Last telemedicine visit with prescribing clinician: 5/17/2024   Next office visit with prescribing clinician: Visit date not found     Additional details provided by patient: PATIENT STATES SHE IS OUT OF MEDICATIONS.    Does the patient have less than a 3 day supply:  [x] Yes  [] No    Would you like a call back once the refill request has been completed: [] Yes [x] No    If the office needs to give you a call back, can they leave a voicemail: [] Yes [x] No    Kathleen Bonilla Rep   06/06/24 13:51 CDT

## 2024-06-10 NOTE — TELEPHONE ENCOUNTER
Rx Refill Note  Requested Prescriptions     Pending Prescriptions Disp Refills    gabapentin (NEURONTIN) 300 MG capsule 90 capsule 0     Sig: Take 1 capsule by mouth 3 (Three) Times a Day.    empagliflozin (Jardiance) 25 MG tablet tablet 90 tablet 0     Sig: Take 1 tablet by mouth Daily.      Last office visit with prescribing clinician: 4/12/2024   Last telemedicine visit with prescribing clinician: 5/17/2024   Next office visit with prescribing clinician: Visit date not found                         Would you like a call back once the refill request has been completed: [] Yes [] No    If the office needs to give you a call back, can they leave a voicemail: [] Yes [] No    Annamarie Velasquez MA  06/10/24, 10:08 CDT

## 2024-06-12 RX ORDER — GABAPENTIN 300 MG/1
300 CAPSULE ORAL 3 TIMES DAILY
Qty: 90 CAPSULE | Refills: 0 | OUTPATIENT
Start: 2024-06-12

## 2024-06-17 ENCOUNTER — TELEPHONE (OUTPATIENT)
Dept: FAMILY MEDICINE CLINIC | Facility: CLINIC | Age: 64
End: 2024-06-17
Payer: MEDICARE

## 2024-06-17 NOTE — TELEPHONE ENCOUNTER
Pt was scheduled for diagnostic mammo and breat US on 5/31/24 at [facility] and was a no show.     Called to see if pt was going to reschedule.No answer. OPALM

## 2024-06-20 NOTE — TELEPHONE ENCOUNTER
Called to see if patient ever rescheduled her mammo and breast ultrasound. Pt stated she had to cancel because she did not have a ride. She stated she did not when to reschedule until she can get a guaranteed ride. Patient was upset on the phone because she is in a trailer with no AC and has shingles.

## 2024-07-02 DIAGNOSIS — G62.9 NEUROPATHY: ICD-10-CM

## 2024-07-02 DIAGNOSIS — R60.0 LOCALIZED EDEMA: ICD-10-CM

## 2024-07-02 DIAGNOSIS — R09.81 HEAD CONGESTION: ICD-10-CM

## 2024-07-02 RX ORDER — AZELASTINE HYDROCHLORIDE 137 UG/1
SPRAY, METERED NASAL
Qty: 30 ML | Refills: 2 | Status: SHIPPED | OUTPATIENT
Start: 2024-07-02

## 2024-07-02 RX ORDER — FUROSEMIDE 40 MG/1
40 TABLET ORAL DAILY
Qty: 90 TABLET | Refills: 0 | Status: SHIPPED | OUTPATIENT
Start: 2024-07-02

## 2024-07-02 NOTE — TELEPHONE ENCOUNTER
Rx Refill Note  Requested Prescriptions     Pending Prescriptions Disp Refills    furosemide (LASIX) 40 MG tablet [Pharmacy Med Name: furosemide 40 mg tablet] 90 tablet 0     Sig: TAKE ONE TABLET BY MOUTH DAILY    Azelastine HCl 137 MCG/SPRAY solution [Pharmacy Med Name: azelastine 137 mcg (0.1 %) nasal spray] 30 mL 2     Sig: USE TWO SPRAYS IN EACH NOSTRIL TWICE DAILY AS DIRECTED      Last office visit with prescribing clinician: 4/12/2024   Last telemedicine visit with prescribing clinician: 5/17/2024   Next office visit with prescribing clinician: Visit date not found     Would you like a call back once the refill request has been completed: [] Yes [] No    If the office needs to give you a call back, can they leave a voicemail: [] Yes [] No    Jessenia Andersen MA  07/02/24, 10:30 CDT

## 2024-07-03 DIAGNOSIS — J96.11 CHRONIC HYPOXIC RESPIRATORY FAILURE, ON HOME OXYGEN THERAPY: ICD-10-CM

## 2024-07-03 DIAGNOSIS — Z99.81 CHRONIC HYPOXIC RESPIRATORY FAILURE, ON HOME OXYGEN THERAPY: ICD-10-CM

## 2024-07-03 RX ORDER — ALBUTEROL SULFATE 90 UG/1
AEROSOL, METERED RESPIRATORY (INHALATION)
Qty: 18 G | Refills: 0 | Status: SHIPPED | OUTPATIENT
Start: 2024-07-03

## 2024-07-03 NOTE — TELEPHONE ENCOUNTER
Rx Refill Note  Requested Prescriptions     Pending Prescriptions Disp Refills    gabapentin (NEURONTIN) 300 MG capsule [Pharmacy Med Name: gabapentin 300 mg capsule] 90 capsule 0     Sig: TAKE ONE CAPSULE BY MOUTH THREE TIMES DAILY      Last office visit with prescribing clinician: 4/12/2024   Next office visit with prescribing clinician: no future appointment    Last RF: 6/6/2024    CC: 4/12/2024    UDS: 2/29/2024      Anabell Polanco CMA  07/03/24, 16:13 CDT

## 2024-07-10 RX ORDER — GABAPENTIN 300 MG/1
300 CAPSULE ORAL 3 TIMES DAILY
Qty: 90 CAPSULE | Refills: 0 | Status: SHIPPED | OUTPATIENT
Start: 2024-07-10

## 2024-07-30 DIAGNOSIS — E78.2 MIXED HYPERLIPIDEMIA: ICD-10-CM

## 2024-07-30 DIAGNOSIS — K21.9 GASTROESOPHAGEAL REFLUX DISEASE WITHOUT ESOPHAGITIS: ICD-10-CM

## 2024-07-30 DIAGNOSIS — Z79.4 TYPE 2 DIABETES MELLITUS WITH HYPERGLYCEMIA, WITH LONG-TERM CURRENT USE OF INSULIN: ICD-10-CM

## 2024-07-30 DIAGNOSIS — I25.10 CORONARY ARTERY DISEASE INVOLVING NATIVE CORONARY ARTERY OF NATIVE HEART WITHOUT ANGINA PECTORIS: ICD-10-CM

## 2024-07-30 DIAGNOSIS — Z91.09 ENVIRONMENTAL ALLERGIES: ICD-10-CM

## 2024-07-30 DIAGNOSIS — E11.65 TYPE 2 DIABETES MELLITUS WITH HYPERGLYCEMIA, WITH LONG-TERM CURRENT USE OF INSULIN: ICD-10-CM

## 2024-07-30 DIAGNOSIS — Z95.1 HX OF CABG: ICD-10-CM

## 2024-07-31 RX ORDER — GLIPIZIDE 5 MG/1
5 TABLET ORAL DAILY
Qty: 90 TABLET | Refills: 0 | Status: SHIPPED | OUTPATIENT
Start: 2024-07-31

## 2024-07-31 RX ORDER — MONTELUKAST SODIUM 10 MG/1
10 TABLET ORAL
Qty: 90 TABLET | Refills: 0 | Status: SHIPPED | OUTPATIENT
Start: 2024-07-31

## 2024-07-31 RX ORDER — ATORVASTATIN CALCIUM 20 MG/1
20 TABLET, FILM COATED ORAL DAILY
Qty: 90 TABLET | Refills: 0 | Status: SHIPPED | OUTPATIENT
Start: 2024-07-31

## 2024-07-31 RX ORDER — OMEPRAZOLE 40 MG/1
40 CAPSULE, DELAYED RELEASE ORAL DAILY
Qty: 90 CAPSULE | Refills: 3 | Status: SHIPPED | OUTPATIENT
Start: 2024-07-31

## 2024-07-31 RX ORDER — CARVEDILOL 6.25 MG/1
6.25 TABLET ORAL 2 TIMES DAILY WITH MEALS
Qty: 180 TABLET | Refills: 0 | Status: SHIPPED | OUTPATIENT
Start: 2024-07-31

## 2024-07-31 NOTE — TELEPHONE ENCOUNTER
Rx Refill Note  Requested Prescriptions     Pending Prescriptions Disp Refills    omeprazole (priLOSEC) 40 MG capsule [Pharmacy Med Name: omeprazole 40 mg capsule,delayed release] 90 capsule 3     Sig: TAKE ONE CAPSULE BY MOUTH DAILY     Signed Prescriptions Disp Refills    Continuous Glucose Sensor (FreeStyle Natalie 2 Sensor) misc 2 each 5     Sig: USE TO MONITOR GLUCOSE LEVELS. CHANGE EVERY 14 DAYS     Authorizing Provider: BAR ZIEGLER     Ordering User: FREDDY POLANCO    montelukast (SINGULAIR) 10 MG tablet 90 tablet 0     Sig: TAKE ONE TABLET BY MOUTH NIGHTLY     Authorizing Provider: BAR ZIEGLER     Ordering User: FREDDY POLANCO    carvedilol (COREG) 6.25 MG tablet 180 tablet 0     Sig: TAKE ONE TABLET BY MOUTH TWICE DAILY WITH MEALS     Authorizing Provider: BAR ZIEGLER     Ordering User: FREDDY POLANCO    glipizide (GLUCOTROL) 5 MG tablet 90 tablet 0     Sig: TAKE ONE TABLET BY MOUTH DAILY     Authorizing Provider: BAR ZIEGLER     Ordering User: FREDDY POLANCO    atorvastatin (LIPITOR) 20 MG tablet 90 tablet 0     Sig: TAKE ONE TABLET BY MOUTH DAILY     Authorizing Provider: BAR ZIEGLER     Ordering User: FREDDY POLANCO      Last office visit with prescribing clinician: 4/12/2024   Next office visit with prescribing clinician: Visit date not     Freddy Polanco CMA  07/31/24, 14:16 CDT

## 2024-08-09 ENCOUNTER — TELEPHONE (OUTPATIENT)
Dept: FAMILY MEDICINE CLINIC | Facility: CLINIC | Age: 64
End: 2024-08-09
Payer: MEDICARE

## 2024-08-09 DIAGNOSIS — Z79.4 TYPE 2 DIABETES MELLITUS WITH HYPERGLYCEMIA, WITH LONG-TERM CURRENT USE OF INSULIN: ICD-10-CM

## 2024-08-09 DIAGNOSIS — G62.9 NEUROPATHY: ICD-10-CM

## 2024-08-09 DIAGNOSIS — E11.65 TYPE 2 DIABETES MELLITUS WITH HYPERGLYCEMIA, WITH LONG-TERM CURRENT USE OF INSULIN: ICD-10-CM

## 2024-08-09 RX ORDER — GABAPENTIN 300 MG/1
300 CAPSULE ORAL 3 TIMES DAILY
Qty: 90 CAPSULE | Refills: 0 | Status: SHIPPED | OUTPATIENT
Start: 2024-08-09

## 2024-08-09 RX ORDER — INSULIN DETEMIR 100 [IU]/ML
15 INJECTION, SOLUTION SUBCUTANEOUS NIGHTLY
Qty: 3 ML | Refills: 2 | Status: SHIPPED | OUTPATIENT
Start: 2024-08-09

## 2024-08-09 NOTE — TELEPHONE ENCOUNTER
Patient is needing a refill on her insulin medication, but she isn't sure what it is exactly she would like a nurse to call her back about this and she has a question about another medication as well she said.

## 2024-08-09 NOTE — TELEPHONE ENCOUNTER
Rx Refill Note  Requested Prescriptions     Pending Prescriptions Disp Refills    Tirzepatide (Mounjaro) 5 MG/0.5ML solution pen-injector pen 2 mL 0     Si.5 mL 1 (One) Time Per Week.    insulin detemir (Levemir FlexPen) 100 UNIT/ML injection 3 mL 2     Sig: Inject 15 Units under the skin into the appropriate area as directed Every Night.    gabapentin (NEURONTIN) 300 MG capsule 90 capsule 0     Sig: Take 1 capsule by mouth 3 (Three) Times a Day.      Last office visit with prescribing clinician: Visit date not found   Last telemedicine visit with prescribing clinician: 2024   Next office visit with prescribing clinician: Visit date not found                         Would you like a call back once the refill request has been completed: [] Yes [] No    If the office needs to give you a call back, can they leave a voicemail: [] Yes [] No    Florecita Stout LPN  24, 16:41 CDT

## 2024-08-16 ENCOUNTER — APPOINTMENT (OUTPATIENT)
Dept: GENERAL RADIOLOGY | Facility: HOSPITAL | Age: 64
End: 2024-08-16
Payer: MEDICARE

## 2024-08-16 ENCOUNTER — APPOINTMENT (OUTPATIENT)
Dept: CT IMAGING | Facility: HOSPITAL | Age: 64
End: 2024-08-16
Payer: MEDICARE

## 2024-08-16 ENCOUNTER — HOSPITAL ENCOUNTER (EMERGENCY)
Facility: HOSPITAL | Age: 64
Discharge: HOME | End: 2024-08-16
Attending: FAMILY MEDICINE
Payer: MEDICARE

## 2024-08-16 VITALS
HEART RATE: 77 BPM | WEIGHT: 179 LBS | BODY MASS INDEX: 29.82 KG/M2 | TEMPERATURE: 97.9 F | SYSTOLIC BLOOD PRESSURE: 126 MMHG | HEIGHT: 65 IN | RESPIRATION RATE: 16 BRPM | OXYGEN SATURATION: 98 % | DIASTOLIC BLOOD PRESSURE: 70 MMHG

## 2024-08-16 DIAGNOSIS — J44.1 COPD EXACERBATION: Primary | ICD-10-CM

## 2024-08-16 LAB
ALBUMIN SERPL-MCNC: 4 G/DL (ref 3.5–5.2)
ALBUMIN/GLOB SERPL: 1.2 G/DL
ALP SERPL-CCNC: 86 U/L (ref 39–117)
ALT SERPL W P-5'-P-CCNC: 12 U/L (ref 1–33)
ANION GAP SERPL CALCULATED.3IONS-SCNC: 11 MMOL/L (ref 5–15)
APTT PPP: 30.6 SECONDS (ref 24.5–36)
AST SERPL-CCNC: 17 U/L (ref 1–32)
BASOPHILS # BLD AUTO: 0.05 10*3/MM3 (ref 0–0.2)
BASOPHILS NFR BLD AUTO: 0.4 % (ref 0–1.5)
BILIRUB SERPL-MCNC: 0.3 MG/DL (ref 0–1.2)
BUN SERPL-MCNC: 14 MG/DL (ref 8–23)
BUN/CREAT SERPL: 16.5 (ref 7–25)
CALCIUM SPEC-SCNC: 9 MG/DL (ref 8.6–10.5)
CHLORIDE SERPL-SCNC: 96 MMOL/L (ref 98–107)
CO2 SERPL-SCNC: 33 MMOL/L (ref 22–29)
CREAT SERPL-MCNC: 0.85 MG/DL (ref 0.57–1)
D DIMER PPP FEU-MCNC: 0.69 MCGFEU/ML (ref 0–0.62)
D-LACTATE SERPL-SCNC: 1.8 MMOL/L (ref 0.5–2)
DEPRECATED RDW RBC AUTO: 49 FL (ref 37–54)
EGFRCR SERPLBLD CKD-EPI 2021: 77.6 ML/MIN/1.73
EOSINOPHIL # BLD AUTO: 0 10*3/MM3 (ref 0–0.4)
EOSINOPHIL NFR BLD AUTO: 0 % (ref 0.3–6.2)
ERYTHROCYTE [DISTWIDTH] IN BLOOD BY AUTOMATED COUNT: 15.9 % (ref 12.3–15.4)
GEN 5 2HR TROPONIN T REFLEX: 25 NG/L
GLOBULIN UR ELPH-MCNC: 3.3 GM/DL
GLUCOSE SERPL-MCNC: 292 MG/DL (ref 65–99)
HCT VFR BLD AUTO: 40.3 % (ref 34–46.6)
HGB BLD-MCNC: 12.1 G/DL (ref 12–15.9)
IMM GRANULOCYTES # BLD AUTO: 0.06 10*3/MM3 (ref 0–0.05)
IMM GRANULOCYTES NFR BLD AUTO: 0.5 % (ref 0–0.5)
INR PPP: 1 (ref 0.91–1.09)
LYMPHOCYTES # BLD AUTO: 1.44 10*3/MM3 (ref 0.7–3.1)
LYMPHOCYTES NFR BLD AUTO: 11.3 % (ref 19.6–45.3)
MAGNESIUM SERPL-MCNC: 1.9 MG/DL (ref 1.6–2.4)
MCH RBC QN AUTO: 25.4 PG (ref 26.6–33)
MCHC RBC AUTO-ENTMCNC: 30 G/DL (ref 31.5–35.7)
MCV RBC AUTO: 84.5 FL (ref 79–97)
MONOCYTES # BLD AUTO: 0.77 10*3/MM3 (ref 0.1–0.9)
MONOCYTES NFR BLD AUTO: 6 % (ref 5–12)
NEUTROPHILS NFR BLD AUTO: 10.41 10*3/MM3 (ref 1.7–7)
NEUTROPHILS NFR BLD AUTO: 81.8 % (ref 42.7–76)
NRBC BLD AUTO-RTO: 0 /100 WBC (ref 0–0.2)
NT-PROBNP SERPL-MCNC: 2631 PG/ML (ref 0–900)
PLATELET # BLD AUTO: 229 10*3/MM3 (ref 140–450)
PMV BLD AUTO: 10.9 FL (ref 6–12)
POTASSIUM SERPL-SCNC: 4.7 MMOL/L (ref 3.5–5.2)
PROCALCITONIN SERPL-MCNC: 0.05 NG/ML (ref 0–0.25)
PROT SERPL-MCNC: 7.3 G/DL (ref 6–8.5)
PROTHROMBIN TIME: 13.6 SECONDS (ref 11.8–14.8)
QT INTERVAL: 446 MS
QT INTERVAL: 468 MS
QTC INTERVAL: 518 MS
QTC INTERVAL: 549 MS
RBC # BLD AUTO: 4.77 10*6/MM3 (ref 3.77–5.28)
SODIUM SERPL-SCNC: 140 MMOL/L (ref 136–145)
TROPONIN T DELTA: -5 NG/L
TROPONIN T SERPL HS-MCNC: 30 NG/L
WBC NRBC COR # BLD AUTO: 12.73 10*3/MM3 (ref 3.4–10.8)

## 2024-08-16 PROCEDURE — 36415 COLL VENOUS BLD VENIPUNCTURE: CPT

## 2024-08-16 PROCEDURE — 25010000002 METHYLPREDNISOLONE PER 125 MG: Performed by: FAMILY MEDICINE

## 2024-08-16 PROCEDURE — 71275 CT ANGIOGRAPHY CHEST: CPT

## 2024-08-16 PROCEDURE — 93005 ELECTROCARDIOGRAM TRACING: CPT | Performed by: FAMILY MEDICINE

## 2024-08-16 PROCEDURE — 96374 THER/PROPH/DIAG INJ IV PUSH: CPT

## 2024-08-16 PROCEDURE — 85379 FIBRIN DEGRADATION QUANT: CPT | Performed by: FAMILY MEDICINE

## 2024-08-16 PROCEDURE — 85025 COMPLETE CBC W/AUTO DIFF WBC: CPT | Performed by: FAMILY MEDICINE

## 2024-08-16 PROCEDURE — 83605 ASSAY OF LACTIC ACID: CPT | Performed by: FAMILY MEDICINE

## 2024-08-16 PROCEDURE — 25510000001 IOPAMIDOL PER 1 ML: Performed by: FAMILY MEDICINE

## 2024-08-16 PROCEDURE — 87040 BLOOD CULTURE FOR BACTERIA: CPT | Performed by: FAMILY MEDICINE

## 2024-08-16 PROCEDURE — 80053 COMPREHEN METABOLIC PANEL: CPT | Performed by: FAMILY MEDICINE

## 2024-08-16 PROCEDURE — 99285 EMERGENCY DEPT VISIT HI MDM: CPT

## 2024-08-16 PROCEDURE — 71045 X-RAY EXAM CHEST 1 VIEW: CPT

## 2024-08-16 PROCEDURE — 83880 ASSAY OF NATRIURETIC PEPTIDE: CPT | Performed by: FAMILY MEDICINE

## 2024-08-16 PROCEDURE — 83735 ASSAY OF MAGNESIUM: CPT | Performed by: FAMILY MEDICINE

## 2024-08-16 PROCEDURE — 84484 ASSAY OF TROPONIN QUANT: CPT | Performed by: FAMILY MEDICINE

## 2024-08-16 PROCEDURE — 85730 THROMBOPLASTIN TIME PARTIAL: CPT | Performed by: FAMILY MEDICINE

## 2024-08-16 PROCEDURE — 85610 PROTHROMBIN TIME: CPT | Performed by: FAMILY MEDICINE

## 2024-08-16 PROCEDURE — 84145 PROCALCITONIN (PCT): CPT | Performed by: FAMILY MEDICINE

## 2024-08-16 RX ORDER — SODIUM CHLORIDE 0.9 % (FLUSH) 0.9 %
10 SYRINGE (ML) INJECTION AS NEEDED
Status: DISCONTINUED | OUTPATIENT
Start: 2024-08-16 | End: 2024-08-16 | Stop reason: HOSPADM

## 2024-08-16 RX ORDER — PREDNISONE 50 MG/1
50 TABLET ORAL DAILY
Qty: 5 TABLET | Refills: 0 | Status: SHIPPED | OUTPATIENT
Start: 2024-08-16 | End: 2024-08-21

## 2024-08-16 RX ORDER — PREDNISONE 50 MG/1
50 TABLET ORAL DAILY
Qty: 5 TABLET | Refills: 0 | Status: SHIPPED | OUTPATIENT
Start: 2024-08-16 | End: 2024-08-16

## 2024-08-16 RX ORDER — AZITHROMYCIN 250 MG/1
TABLET, FILM COATED ORAL
Qty: 6 TABLET | Refills: 0 | Status: SHIPPED | OUTPATIENT
Start: 2024-08-16

## 2024-08-16 RX ORDER — AZITHROMYCIN 250 MG/1
TABLET, FILM COATED ORAL
Qty: 6 TABLET | Refills: 0 | Status: SHIPPED | OUTPATIENT
Start: 2024-08-16 | End: 2024-08-16

## 2024-08-16 RX ORDER — METHYLPREDNISOLONE SODIUM SUCCINATE 125 MG/2ML
125 INJECTION, POWDER, LYOPHILIZED, FOR SOLUTION INTRAMUSCULAR; INTRAVENOUS ONCE
Status: COMPLETED | OUTPATIENT
Start: 2024-08-16 | End: 2024-08-16

## 2024-08-16 RX ORDER — NITROGLYCERIN 0.4 MG/1
0.4 TABLET SUBLINGUAL
Qty: 24 TABLET | Refills: 0 | Status: SHIPPED | OUTPATIENT
Start: 2024-08-16

## 2024-08-16 RX ADMIN — METHYLPREDNISOLONE SODIUM SUCCINATE 125 MG: 125 INJECTION, POWDER, FOR SOLUTION INTRAMUSCULAR; INTRAVENOUS at 12:08

## 2024-08-16 RX ADMIN — IOPAMIDOL 78 ML: 755 INJECTION, SOLUTION INTRAVENOUS at 14:08

## 2024-08-16 NOTE — ED NOTES
Patient was on the phone with two family friends when this nurse entered the room. This nurse spoke with the house supervisor. Explained that family/friend would need to bring the patients oxygen tank.   Patient stated they would not bring it.   Family friend that was on the phone stated they had a oxygen concentrator, and also large tanks of oxygen but they couldn't come to the hospital.   Patient has cell phone and purse with her. This nurse placed patient in wheelchair with full oxygen tank.     Patient is ambulatory and AxOx4

## 2024-08-16 NOTE — ED PROVIDER NOTES
Subjective   History of Present Illness  64-year-old female with shortness of breath.  Worsening over the last few days.  She states has been going on for about a month now.  She states that she has a cough that she wants to get the sputum out.  Occasion is clear occasionally is yellow.  She states that she has pleuritic type pain.  Worse with chest pain is worse with deep breathing and coughing.  She is a smoker.  Smokes about half pack per day now.  Patient denies any other symptoms at this time.      Review of Systems   Respiratory:  Positive for cough and shortness of breath.    All other systems reviewed and are negative.      Past Medical History:   Diagnosis Date    Allergic     Anxiety     Arthritis     Asthma 1995    Cancer 2001    Cholelithiasis 07/1997    COPD (chronic obstructive pulmonary disease)     Coronary artery disease 2009    Depression     Diabetes mellitus     GERD (gastroesophageal reflux disease)     Heart problem     Hyperlipidemia     Hypertension 2009    Low back pain     Memory problem     Pneumonia     Sleep apnea     Urinary tract infection     Visual impairment        Allergies   Allergen Reactions    Fluoxetine Hcl Shortness Of Breath    Levofloxacin Anaphylaxis    Norfloxacin Shortness Of Breath    Tuberculin Ppd Rash       Past Surgical History:   Procedure Laterality Date    CARDIAC SURGERY  08/08/2009    CHOLECYSTECTOMY      COLONOSCOPY  09/21/2012    colon polyps  diverticulosis  diarrhea per dr. magdaleno    CORONARY ARTERY BYPASS GRAFT      CORONARY STENT PLACEMENT  2009 and 2010    ENDOSCOPY  09/21/2012    normal esophagus a single small papula nodule with no bleding and no stigmata of recent bleeding eas found in the stomach  this was biopsied  normal duodenal bulb and 2nd part of the duodenym  biopsy was per formed  biopies were taken with a cold forceps for evalution of celiac disease by sharla pride    TUBAL ABDOMINAL LIGATION  09/1983       Family History   Problem Relation Age of  Onset    Cancer Mother     Hyperlipidemia Sister     Hypertension Sister     Arthritis Sister     Asthma Sister     COPD Sister     Depression Sister     Thyroid disease Sister     Diabetes Maternal Grandmother     Anxiety disorder Daughter     Depression Daughter     Depression Daughter        Social History     Socioeconomic History    Marital status:    Tobacco Use    Smoking status: Every Day     Current packs/day: 1.00     Average packs/day: 1 pack/day for 45.0 years (45.0 ttl pk-yrs)     Types: Cigarettes    Smokeless tobacco: Never   Vaping Use    Vaping status: Never Used   Substance and Sexual Activity    Alcohol use: Not Currently    Drug use: Never    Sexual activity: Not Currently     Partners: Male     Birth control/protection: Other     Comment: Tubs tied           Objective   Physical Exam  Vitals and nursing note reviewed.   Constitutional:       Appearance: She is well-developed.   HENT:      Head: Normocephalic and atraumatic.      Mouth/Throat:      Mouth: Mucous membranes are moist.   Eyes:      Extraocular Movements: Extraocular movements intact.      Pupils: Pupils are equal, round, and reactive to light.   Cardiovascular:      Rate and Rhythm: Normal rate and regular rhythm.      Heart sounds: Normal heart sounds.   Pulmonary:      Effort: Pulmonary effort is normal.      Breath sounds: Wheezing present.   Abdominal:      General: Bowel sounds are normal.      Palpations: Abdomen is soft.      Tenderness: There is no abdominal tenderness.   Musculoskeletal:      Right lower leg: No edema.      Left lower leg: No edema.   Skin:     General: Skin is warm and dry.   Neurological:      General: No focal deficit present.      Mental Status: She is alert and oriented to person, place, and time.   Psychiatric:         Mood and Affect: Mood normal.         Behavior: Behavior normal.         Procedures           ED Course  ED Course as of 08/16/24 1550   Fri Aug 16, 2024   1255 EKG rate  81  Normal sinus rhythm  No STEMI [RP]      ED Course User Index  [RP] Latrell Street MD                                           Lab Results (last 24 hours)       Procedure Component Value Units Date/Time    CBC & Differential [[id number]]  (Abnormal) Collected: 08/16/24 1203    Specimen: Blood Updated: 08/16/24 1221    Narrative:      The following orders were created for panel order CBC & Differential.  Procedure                               Abnormality         Status                     ---------                               -----------         ------                     CBC Auto Differential[[id number]]        Abnormal            Final result                 Please view results for these tests on the individual orders.    Comprehensive Metabolic Panel [[id number]]  (Abnormal) Collected: 08/16/24 1203    Specimen: Blood Updated: 08/16/24 1248     Glucose 292 mg/dL      BUN 14 mg/dL      Creatinine 0.85 mg/dL      Sodium 140 mmol/L      Potassium 4.7 mmol/L      Chloride 96 mmol/L      CO2 33.0 mmol/L      Calcium 9.0 mg/dL      Total Protein 7.3 g/dL      Albumin 4.0 g/dL      ALT (SGPT) 12 U/L      AST (SGOT) 17 U/L      Alkaline Phosphatase 86 U/L      Total Bilirubin 0.3 mg/dL      Globulin 3.3 gm/dL      A/G Ratio 1.2 g/dL      BUN/Creatinine Ratio 16.5     Anion Gap 11.0 mmol/L      eGFR 77.6 mL/min/1.73     Narrative:      GFR Normal >60  Chronic Kidney Disease <60  Kidney Failure <15      Protime-INR [[id number]]  (Normal) Collected: 08/16/24 1203    Specimen: Blood Updated: 08/16/24 1225     Protime 13.6 Seconds      INR 1.00    aPTT [[id number]]  (Normal) Collected: 08/16/24 1203    Specimen: Blood Updated: 08/16/24 1225     PTT 30.6 seconds     BNP [[id number]]  (Abnormal) Collected: 08/16/24 1203    Specimen: Blood Updated: 08/16/24 1243     proBNP 2,631.0 pg/mL     Narrative:      This assay is used as an aid in the diagnosis of individuals suspected of having heart failure. It can be used as an aid in  "the diagnosis of acute decompensated heart failure (ADHF) in patients presenting with signs and symptoms of ADHF to the emergency department (ED). In addition, NT-proBNP of <300 pg/mL indicates ADHF is not likely.    Age Range Result Interpretation  NT-proBNP Concentration (pg/mL:      <50             Positive            >450                   Gray                 300-450                    Negative             <300    50-75           Positive            >900                  Gray                300-900                  Negative            <300      >75             Positive            >1800                  Gray                300-1800                  Negative            <300    D-dimer, Quantitative [[id number]]  (Abnormal) Collected: 08/16/24 1203    Specimen: Blood Updated: 08/16/24 1225     D-Dimer, Quantitative 0.69 MCGFEU/mL     Narrative:      According to the assay 's published package insert, a normal (<0.50 MCGFEU/mL) D-dimer result in conjunction with a non-high clinical probability assessment, excludes deep vein thrombosis (DVT) and pulmonary embolism (PE) with high sensitivity.    D-dimer values increase with age and this can make VTE exclusion of an older population difficult. To address this, the [employer], based on best available evidence and recent guidelines, recommends that clinicians use age-adjusted D-dimer thresholds in patients greater than 50 years of age with: a) a low probability of PE who do not meet all Pulmonary Embolism Rule Out Criteria, or b) in those with intermediate probability of PE.   The formula for an age-adjusted D-dimer cut-off is \"age/100\".  For example, a 60 year old patient would have an age-adjusted cut-off of 0.60 MCGFEU/mL and an 80 year old 0.80 MCGFEU/mL.    High Sensitivity Troponin T [[id number]]  (Abnormal) Collected: 08/16/24 1203    Specimen: Blood Updated: 08/16/24 1242     HS Troponin T 30 ng/L     Narrative:      High Sensitive " "Troponin T Reference Range:  <14.0 ng/L- Negative Female for AMI  <22.0 ng/L- Negative Male for AMI  >=14 - Abnormal Female indicating possible myocardial injury.  >=22 - Abnormal Male indicating possible myocardial injury.   Clinicians would have to utilize clinical acumen, EKG, Troponin, and serial changes to determine if it is an Acute Myocardial Infarction or myocardial injury due to an underlying chronic condition.         Blood Culture - Blood, Arm, Left [[id number]] Collected: 08/16/24 1203    Specimen: Blood from Arm, Left Updated: 08/16/24 1231    Blood Culture - Blood, Arm, Right [[id number]] Collected: 08/16/24 1203    Specimen: Blood from Arm, Right Updated: 08/16/24 1410    Lactic Acid, Plasma [[id number]]  (Normal) Collected: 08/16/24 1203    Specimen: Blood Updated: 08/16/24 1246     Lactate 1.8 mmol/L     Procalcitonin [[id number]]  (Normal) Collected: 08/16/24 1203    Specimen: Blood Updated: 08/16/24 1243     Procalcitonin 0.05 ng/mL     Narrative:      As a Marker for Sepsis (Non-Neonates):    1. <0.5 ng/mL represents a low risk of severe sepsis and/or septic shock.  2. >2 ng/mL represents a high risk of severe sepsis and/or septic shock.    As a Marker for Lower Respiratory Tract Infections that require antibiotic therapy:    PCT on Admission    Antibiotic Therapy       6-12 Hrs later    >0.5                Strongly Recommended  >0.25 - <0.5        Recommended   0.1 - 0.25          Discouraged              Remeasure/reassess PCT  <0.1                Strongly Discouraged     Remeasure/reassess PCT    As 28 day mortality risk marker: \"Change in Procalcitonin Result\" (>80% or <=80%) if Day 0 (or Day 1) and Day 4 values are available. Refer to http://www.ResQUs-pct-calculator.com    Change in PCT <=80%  A decrease of PCT levels below or equal to 80% defines a positive change in PCT test result representing a higher risk for 28-day all-cause mortality of patients diagnosed with severe sepsis for septic " shock.    Change in PCT >80%  A decrease of PCT levels of more than 80% defines a negative change in PCT result representing a lower risk for 28-day all-cause mortality of patients diagnosed with severe sepsis or septic shock.       Magnesium [[id number]]  (Normal) Collected: 08/16/24 1203    Specimen: Blood Updated: 08/16/24 1244     Magnesium 1.9 mg/dL     CBC Auto Differential [[id number]]  (Abnormal) Collected: 08/16/24 1203    Specimen: Blood Updated: 08/16/24 1221     WBC 12.73 10*3/mm3      RBC 4.77 10*6/mm3      Hemoglobin 12.1 g/dL      Hematocrit 40.3 %      MCV 84.5 fL      MCH 25.4 pg      MCHC 30.0 g/dL      RDW 15.9 %      RDW-SD 49.0 fl      MPV 10.9 fL      Platelets 229 10*3/mm3      Neutrophil % 81.8 %      Lymphocyte % 11.3 %      Monocyte % 6.0 %      Eosinophil % 0.0 %      Basophil % 0.4 %      Immature Grans % 0.5 %      Neutrophils, Absolute 10.41 10*3/mm3      Lymphocytes, Absolute 1.44 10*3/mm3      Monocytes, Absolute 0.77 10*3/mm3      Eosinophils, Absolute 0.00 10*3/mm3      Basophils, Absolute 0.05 10*3/mm3      Immature Grans, Absolute 0.06 10*3/mm3      nRBC 0.0 /100 WBC     High Sensitivity Troponin T 2Hr [[id number]]  (Abnormal) Collected: 08/16/24 1351    Specimen: Blood Updated: 08/16/24 1418     HS Troponin T 25 ng/L      Troponin T Delta -5 ng/L     Narrative:      High Sensitive Troponin T Reference Range:  <14.0 ng/L- Negative Female for AMI  <22.0 ng/L- Negative Male for AMI  >=14 - Abnormal Female indicating possible myocardial injury.  >=22 - Abnormal Male indicating possible myocardial injury.   Clinicians would have to utilize clinical acumen, EKG, Troponin, and serial changes to determine if it is an Acute Myocardial Infarction or myocardial injury due to an underlying chronic condition.               CT Angiogram Chest   Final Result   1.  No evidence of pulmonary embolus.   2.  Multifocal parenchymal scarring with volume loss, as described   above. There is a new focus of  clustered infectious   nodularity/respiratory bronchiolitis in the left upper lobe in   particular. No consolidation.   3.  Previous coronary bypass.               This report was signed and finalized on 8/16/2024 2:31 PM by Dr Joaquin Weber.          XR Chest 1 View   Final Result       Cardiomegaly without visible focal pneumonia or significant edema.               This report was signed and finalized on 8/16/2024 12:38 PM by Daryl Rehman.            Medications   sodium chloride 0.9 % flush 10 mL (has no administration in time range)   methylPREDNISolone sodium succinate (SOLU-Medrol) injection 125 mg (125 mg Intravenous Given 8/16/24 1208)   iopamidol (ISOVUE-370) 76 % injection 100 mL (78 mL Intravenous Given 8/16/24 1408)       Medical Decision Making  I had a discussion with the patient/family regarding diagnosis, diagnostic results, treatment plan, and medications.  The patient  indicated understanding of these instructions.  I spent adequate time at the bedside prior to discharge necessary to discuss the aftercare instructions, giving patient education, providing explanations of the results of our evaluations/findings, and my decision making to assure that the patient understand the plan of care.  Time was allotted to answer questions at that time and throughout the ED course.  Emphasis was placed on timely follow-up after discharge.  I also discussed the potential for the development of an acute emergent condition requiring further evaluation, return to the ER, admission, or even surgical intervention. I discussed that we found nothing during the visit today indicating the need for further ER workup at this time, admission to the hospital, or the presence of an acute unstable medical condition.  I encouraged the patient to return to the emergency department immediately for ANY concerns, worsening, new complaints, or if symptoms persist and unable to seek follow-up in a timely fashion.  The patient  expressed understanding and agreement with this plan.      Problems Addressed:  COPD exacerbation: complicated acute illness or injury    Amount and/or Complexity of Data Reviewed  Labs: ordered. Decision-making details documented in ED Course.  Radiology: ordered. Decision-making details documented in ED Course.  ECG/medicine tests: ordered. Decision-making details documented in ED Course.    Risk  Prescription drug management.        Final diagnoses:   COPD exacerbation       ED Disposition  ED Disposition       ED Disposition   Discharge    Condition   Stable    Comment   --               Shira Pena MD  [address] [address]  [phone]    Schedule an appointment as soon as possible for a visit       [facility] EMERGENCY DEPARTMENT  [address] [facility] [address] [phone]    As needed, If symptoms worsen         Medication List        New Prescriptions      azithromycin 250 MG tablet  Commonly known as: Zithromax Z-Nasim  Take 2 tablets by mouth on day 1, then 1 tablet daily on days 2-5     predniSONE 50 MG tablet  Commonly known as: DELTASONE  Take 1 tablet by mouth Daily for 5 days.            Changed      * nitroglycerin 0.4 MG SL tablet  Commonly known as: NITROSTAT  Place 1 tablet under the tongue Daily As Needed for Chest Pain.  What changed: Another medication with the same name was added. Make sure you understand how and when to take each.     * nitroglycerin 0.4 MG SL tablet  Commonly known as: NITROSTAT  Place 1 tablet under the tongue Every 5 (Five) Minutes As Needed for Chest Pain. Take no more than 3 doses in 15 minutes.  What changed: You were already taking a medication with the same name, and this prescription was added. Make sure you understand how and when to take each.           * This list has 2 medication(s) that are the same as other medications prescribed for you. Read the directions carefully, and ask your doctor or other care  provider to review them with you.                   Where to Get Your Medications        These medications were sent to [facility], [place] - [address] - [phone] [facility] [phone]   [address], [place] [address]      Phone: [phone]   azithromycin 250 MG tablet  nitroglycerin 0.4 MG SL tablet  predniSONE 50 MG tablet            Latrell Street MD  08/16/24 8178

## 2024-08-21 LAB
BACTERIA SPEC AEROBE CULT: NORMAL
BACTERIA SPEC AEROBE CULT: NORMAL

## 2024-08-23 ENCOUNTER — PATIENT OUTREACH (OUTPATIENT)
Dept: CASE MANAGEMENT | Facility: OTHER | Age: 64
End: 2024-08-23
Payer: MEDICARE

## 2024-08-23 NOTE — OUTREACH NOTE
AMBULATORY CASE MANAGEMENT NOTE    Names and Relationships of Patient/Support Persons: Contact: Nora Carney; Relationship: Self -     Patient Outreach    Outreach call made to pt seen at ED 8/16/24 for COPD exacerbation. Pt reports she feels better. She wasn't able to fill her rxs until Monday because pharmacy is closed on weekends. She finished her abx today and has 1 or 2 days left of prednisone. When asked about inhalers, she states she uses a ventolin inhaler but has not been taking symbicort as listed. She wonders about getting on trelegy that her sister takes. Discussed need for f/u with pcp and she reports she does not drive and has unreliable transportation. She says she plans on calling her insurance on Monday to inquire about transportation assistance. She says she is also in need of a diagnostic mammogram and U/S of rt breast, colonoscopy, pap smear, and cardio f/u. Per chart review, last eye exam 3/11/24 UTD and DEXA 2/29/24 UTD.    Amna TYSON  Ambulatory Case Management    8/23/2024, 16:41 EDT

## 2024-08-27 DIAGNOSIS — E11.65 TYPE 2 DIABETES MELLITUS WITH HYPERGLYCEMIA, WITH LONG-TERM CURRENT USE OF INSULIN: ICD-10-CM

## 2024-08-27 DIAGNOSIS — Z95.1 HX OF CABG: ICD-10-CM

## 2024-08-27 DIAGNOSIS — I25.10 CORONARY ARTERY DISEASE INVOLVING NATIVE CORONARY ARTERY OF NATIVE HEART WITHOUT ANGINA PECTORIS: ICD-10-CM

## 2024-08-27 DIAGNOSIS — I10 PRIMARY HYPERTENSION: ICD-10-CM

## 2024-08-27 DIAGNOSIS — Z79.4 TYPE 2 DIABETES MELLITUS WITH HYPERGLYCEMIA, WITH LONG-TERM CURRENT USE OF INSULIN: ICD-10-CM

## 2024-08-27 RX ORDER — LISINOPRIL 20 MG/1
20 TABLET ORAL DAILY
Qty: 30 TABLET | Refills: 2 | OUTPATIENT
Start: 2024-08-27

## 2024-08-27 RX ORDER — EMPAGLIFLOZIN 25 MG/1
25 TABLET, FILM COATED ORAL DAILY
Qty: 90 TABLET | Refills: 0 | Status: SHIPPED | OUTPATIENT
Start: 2024-08-27

## 2024-08-29 LAB
QT INTERVAL: 446 MS
QT INTERVAL: 468 MS
QTC INTERVAL: 518 MS
QTC INTERVAL: 549 MS

## 2024-08-30 ENCOUNTER — PATIENT OUTREACH (OUTPATIENT)
Dept: CASE MANAGEMENT | Facility: OTHER | Age: 64
End: 2024-08-30
Payer: MEDICARE

## 2024-08-30 NOTE — OUTREACH NOTE
AMBULATORY CASE MANAGEMENT NOTE    Names and Relationships of Patient/Support Persons: Contact: Nora Carney; Relationship: Self -     Patient Outreach    Called pt to follow up on transportation and she says I woke her up. She has not called her insurance yet to inquire about transportation assistance. Offered to call 3-way with her. She declined at this time and requested I call back next week on Tuesday (closed Monday for holiday).    Amna TYSON  Ambulatory Case Management    8/30/2024, 09:43 EDT

## 2024-09-03 DIAGNOSIS — J96.11 CHRONIC HYPOXIC RESPIRATORY FAILURE, ON HOME OXYGEN THERAPY: ICD-10-CM

## 2024-09-03 DIAGNOSIS — Z99.81 CHRONIC HYPOXIC RESPIRATORY FAILURE, ON HOME OXYGEN THERAPY: ICD-10-CM

## 2024-09-04 RX ORDER — ALBUTEROL SULFATE 90 UG/1
AEROSOL, METERED RESPIRATORY (INHALATION)
Qty: 18 G | Refills: 0 | Status: SHIPPED | OUTPATIENT
Start: 2024-09-04

## 2024-09-16 DIAGNOSIS — G62.9 NEUROPATHY: ICD-10-CM

## 2024-09-18 RX ORDER — GABAPENTIN 300 MG/1
300 CAPSULE ORAL 3 TIMES DAILY
Qty: 90 CAPSULE | Refills: 0 | Status: SHIPPED | OUTPATIENT
Start: 2024-09-18

## 2024-09-20 DIAGNOSIS — Z95.1 HX OF CABG: ICD-10-CM

## 2024-09-20 DIAGNOSIS — F33.1 MODERATE EPISODE OF RECURRENT MAJOR DEPRESSIVE DISORDER: ICD-10-CM

## 2024-09-20 DIAGNOSIS — I25.10 CORONARY ARTERY DISEASE INVOLVING NATIVE CORONARY ARTERY OF NATIVE HEART WITHOUT ANGINA PECTORIS: ICD-10-CM

## 2024-09-20 DIAGNOSIS — R60.0 LOCALIZED EDEMA: ICD-10-CM

## 2024-09-20 DIAGNOSIS — I10 PRIMARY HYPERTENSION: ICD-10-CM

## 2024-09-20 RX ORDER — VENLAFAXINE HYDROCHLORIDE 75 MG/1
225 CAPSULE, EXTENDED RELEASE ORAL DAILY
Qty: 270 CAPSULE | Refills: 0 | Status: SHIPPED | OUTPATIENT
Start: 2024-09-20

## 2024-09-20 RX ORDER — LISINOPRIL 20 MG/1
20 TABLET ORAL DAILY
Qty: 90 TABLET | Refills: 0 | Status: SHIPPED | OUTPATIENT
Start: 2024-09-20

## 2024-09-20 RX ORDER — FUROSEMIDE 40 MG
40 TABLET ORAL DAILY
Qty: 90 TABLET | Refills: 0 | Status: SHIPPED | OUTPATIENT
Start: 2024-09-20

## 2024-09-23 DIAGNOSIS — E11.65 TYPE 2 DIABETES MELLITUS WITH HYPERGLYCEMIA, WITH LONG-TERM CURRENT USE OF INSULIN: ICD-10-CM

## 2024-09-23 DIAGNOSIS — Z79.4 TYPE 2 DIABETES MELLITUS WITH HYPERGLYCEMIA, WITH LONG-TERM CURRENT USE OF INSULIN: ICD-10-CM

## 2024-09-26 RX ORDER — TIRZEPATIDE 5 MG/.5ML
INJECTION, SOLUTION SUBCUTANEOUS
Qty: 2 ML | Refills: 2 | OUTPATIENT
Start: 2024-09-26

## 2024-10-18 DIAGNOSIS — E78.2 MIXED HYPERLIPIDEMIA: ICD-10-CM

## 2024-10-18 DIAGNOSIS — Z99.81 CHRONIC HYPOXIC RESPIRATORY FAILURE, ON HOME OXYGEN THERAPY: ICD-10-CM

## 2024-10-18 DIAGNOSIS — Z79.4 TYPE 2 DIABETES MELLITUS WITH HYPERGLYCEMIA, WITH LONG-TERM CURRENT USE OF INSULIN: ICD-10-CM

## 2024-10-18 DIAGNOSIS — J96.11 CHRONIC HYPOXIC RESPIRATORY FAILURE, ON HOME OXYGEN THERAPY: ICD-10-CM

## 2024-10-18 DIAGNOSIS — Z91.09 ENVIRONMENTAL ALLERGIES: ICD-10-CM

## 2024-10-18 DIAGNOSIS — I25.10 CORONARY ARTERY DISEASE INVOLVING NATIVE CORONARY ARTERY OF NATIVE HEART WITHOUT ANGINA PECTORIS: ICD-10-CM

## 2024-10-18 DIAGNOSIS — E11.65 TYPE 2 DIABETES MELLITUS WITH HYPERGLYCEMIA, WITH LONG-TERM CURRENT USE OF INSULIN: ICD-10-CM

## 2024-10-18 DIAGNOSIS — G62.9 NEUROPATHY: ICD-10-CM

## 2024-10-18 DIAGNOSIS — Z95.1 HX OF CABG: ICD-10-CM

## 2024-10-18 NOTE — TELEPHONE ENCOUNTER
Rx Refill Note  Requested Prescriptions     Pending Prescriptions Disp Refills    Mounjaro 5 MG/0.5ML solution pen-injector pen [Pharmacy Med Name: Mounjaro 5 mg/0.5 mL subcutaneous pen injector] 2 mL 2     Sig: inject 5mg UNDER THE SKIN IN THE appropriate AREA AS DIRECTED ONCE A WEEK    Ventolin  (90 Base) MCG/ACT inhaler [Pharmacy Med Name: Ventolin HFA 90 mcg/actuation aerosol inhaler] 18 g 0     Sig: INHALE ONE PUFF INTO THE LUNGS EVERY 4 HOURS AS NEEDED FOR WHEEZING    gabapentin (NEURONTIN) 300 MG capsule [Pharmacy Med Name: gabapentin 300 mg capsule] 90 capsule 0     Sig: TAKE ONE CAPSULE BY MOUTH THREE TIMES DAILY    carvedilol (COREG) 6.25 MG tablet [Pharmacy Med Name: carvedilol 6.25 mg tablet] 180 tablet 0     Sig: TAKE ONE TABLET BY MOUTH TWICE DAILY WITH MEALS    glipizide (GLUCOTROL) 5 MG tablet [Pharmacy Med Name: glipizide 5 mg tablet] 90 tablet 0     Sig: TAKE ONE TABLET BY MOUTH DAILY    atorvastatin (LIPITOR) 20 MG tablet [Pharmacy Med Name: atorvastatin 20 mg tablet] 90 tablet 0     Sig: TAKE ONE TABLET BY MOUTH DAILY    montelukast (SINGULAIR) 10 MG tablet [Pharmacy Med Name: montelukast 10 mg tablet] 90 tablet 0     Sig: TAKE ONE TABLET BY MOUTH NIGHTLY      Last office visit with prescribing clinician: 4/12/2024   Next office visit with prescribing clinician: 11/1/2024     Anabell Polanco CMA  10/18/24, 15:35 CDT

## 2024-10-19 RX ORDER — CARVEDILOL 6.25 MG/1
6.25 TABLET ORAL 2 TIMES DAILY WITH MEALS
Qty: 180 TABLET | Refills: 0 | Status: SHIPPED | OUTPATIENT
Start: 2024-10-19

## 2024-10-19 RX ORDER — ALBUTEROL SULFATE 90 UG/1
AEROSOL, METERED RESPIRATORY (INHALATION)
Qty: 18 G | Refills: 0 | Status: SHIPPED | OUTPATIENT
Start: 2024-10-19

## 2024-10-19 RX ORDER — TIRZEPATIDE 5 MG/.5ML
INJECTION, SOLUTION SUBCUTANEOUS
Qty: 2 ML | Refills: 2 | Status: SHIPPED | OUTPATIENT
Start: 2024-10-19

## 2024-10-19 RX ORDER — MONTELUKAST SODIUM 10 MG/1
10 TABLET ORAL
Qty: 90 TABLET | Refills: 0 | Status: SHIPPED | OUTPATIENT
Start: 2024-10-19

## 2024-10-19 RX ORDER — GABAPENTIN 300 MG/1
300 CAPSULE ORAL 3 TIMES DAILY
Qty: 90 CAPSULE | Refills: 0 | Status: SHIPPED | OUTPATIENT
Start: 2024-10-19

## 2024-10-19 RX ORDER — ATORVASTATIN CALCIUM 20 MG/1
20 TABLET, FILM COATED ORAL DAILY
Qty: 90 TABLET | Refills: 0 | Status: SHIPPED | OUTPATIENT
Start: 2024-10-19

## 2024-10-19 RX ORDER — GLIPIZIDE 5 MG/1
5 TABLET ORAL DAILY
Qty: 90 TABLET | Refills: 0 | Status: SHIPPED | OUTPATIENT
Start: 2024-10-19

## 2024-11-15 ENCOUNTER — TELEPHONE (OUTPATIENT)
Dept: FAMILY MEDICINE CLINIC | Facility: CLINIC | Age: 64
End: 2024-11-15
Payer: MEDICARE

## 2024-11-15 DIAGNOSIS — G62.9 NEUROPATHY: ICD-10-CM

## 2024-11-15 DIAGNOSIS — J96.11 CHRONIC HYPOXIC RESPIRATORY FAILURE, ON HOME OXYGEN THERAPY: ICD-10-CM

## 2024-11-15 DIAGNOSIS — Z99.81 CHRONIC HYPOXIC RESPIRATORY FAILURE, ON HOME OXYGEN THERAPY: ICD-10-CM

## 2024-11-26 RX ORDER — GABAPENTIN 300 MG/1
300 CAPSULE ORAL 3 TIMES DAILY
Qty: 90 CAPSULE | Refills: 0 | OUTPATIENT
Start: 2024-11-26

## 2024-11-26 RX ORDER — ALBUTEROL SULFATE 90 UG/1
AEROSOL, METERED RESPIRATORY (INHALATION)
Qty: 18 G | Refills: 0 | OUTPATIENT
Start: 2024-11-26

## 2024-11-26 NOTE — TELEPHONE ENCOUNTER
Rx Refill Note  Requested Prescriptions     Pending Prescriptions Disp Refills    Ventolin  (90 Base) MCG/ACT inhaler [Pharmacy Med Name: Ventolin HFA 90 mcg/actuation aerosol inhaler] 18 g 0     Sig: INHALE ONE PUFF INTO THE LUNGS EVERY 4 HOURS AS NEEDED FOR WHEEZING    gabapentin (NEURONTIN) 300 MG capsule [Pharmacy Med Name: gabapentin 300 mg capsule] 90 capsule 0     Sig: TAKE ONE CAPSULE BY MOUTH THREE TIMES DAILY      Last office visit with prescribing clinician: 4/12/2024   Last telemedicine visit with prescribing clinician: Visit date not found   Next office visit with prescribing clinician: 11/15/2024       Last UDS 10-04-22  CSA 02-29-24          Kaleigh Reina MA  11/26/24, 10:33 CST